# Patient Record
Sex: FEMALE | Race: WHITE | NOT HISPANIC OR LATINO | Employment: FULL TIME | URBAN - METROPOLITAN AREA
[De-identification: names, ages, dates, MRNs, and addresses within clinical notes are randomized per-mention and may not be internally consistent; named-entity substitution may affect disease eponyms.]

---

## 2017-01-09 ENCOUNTER — TELEPHONE (OUTPATIENT)
Dept: INTERNAL MEDICINE | Facility: CLINIC | Age: 57
End: 2017-01-09

## 2017-01-09 RX ORDER — METFORMIN HYDROCHLORIDE 500 MG/1
1000 TABLET, EXTENDED RELEASE ORAL
Qty: 60 TABLET | Refills: 3 | Status: SHIPPED | OUTPATIENT
Start: 2017-01-09 | End: 2017-02-27

## 2017-01-09 NOTE — TELEPHONE ENCOUNTER
I contacted the patient to confirm RX Glucophage XR was covered by insurance. Dr. Perez has ok'd for patient to switch. E-scribed to [facility] in [place] this morning.       ----- Message -----     From: Dionne Bond     Sent: 1/6/2017   2:53 PM       To: Maria L Perez MD  Subject: RE:Update                                        Hi, Dr. Perez...I'm sorry to bother you so late on a Friday afternoon, but I just called in a refill of Metformin XR at [facility], and due to my pathetic insurance, they are telling me it's no longer covered.  I called the pharmacy for some clarification, and they told me that generic Glucophage XR is covered, but I am taking the generic for Fortamet, and they are not AB rated.  I took my last tablet today of the 1st bottle, and it's so much better than the instant release type.  Again, sorry for the short notice.    ----- Message -----  From: Maria L Perez MD  Sent: 12/8/2016  1:27 PM EST  To: Dionne Bond  Subject: RE:Update    I think we should try the metformin ER and I will hold back some Januvia and Janumet XR for you in our drug closet as well. I'll send in the script for you. Start with 1000mg ER and let me know how it goes. We can work up slowly.     ----- Message -----     From: Dionne Bond     Sent: 12/8/2016 11:12 AM EST       To: Maria L Perez MD  Subject: RE:Update    Dr. Perez, thank you for thinking of me.  Yes, I am still having nausea and diarrhea at 3 tablets daily.  I have just one tablet left, and the thought of getting another bottle of it to take has been hard to think about.  My blood sugar (one daily) remains high, but has come down...I am not seeing as many big swings in it.  I have had, in this last week, 2 days where I felt like my old self again, and I am so grateful for that!  So, yes, I would welcome the change in medication.  ----- Message -----  From: Maria L Perez MD  Sent: 12/8/2016  7:56 AM EST  To: Dionne Bond  Subject: Update    Mrs. Bond,     I was  just checking in to see how you were doing? I was talking to an endocrinologist last night and she said that a lot of patients have  better success with metformin ER in terms of stomach upset and nausea. She suggested that we try switching to that if you are not tolerating or going to to Janumet which as you know is combo of Januvia and Metformin. She said that the formulation of Metformin in Janumet is easier for patients to tolerate. What do you think?

## 2017-02-26 PROBLEM — E55.9 VITAMIN D DEFICIENCY: Status: ACTIVE | Noted: 2017-02-26

## 2017-02-27 ENCOUNTER — OFFICE VISIT (OUTPATIENT)
Dept: INTERNAL MEDICINE | Facility: CLINIC | Age: 57
End: 2017-02-27

## 2017-02-27 VITALS
SYSTOLIC BLOOD PRESSURE: 118 MMHG | BODY MASS INDEX: 33.4 KG/M2 | OXYGEN SATURATION: 98 % | DIASTOLIC BLOOD PRESSURE: 80 MMHG | WEIGHT: 207.8 LBS | HEART RATE: 72 BPM | HEIGHT: 66 IN

## 2017-02-27 DIAGNOSIS — E55.9 VITAMIN D DEFICIENCY: ICD-10-CM

## 2017-02-27 DIAGNOSIS — E11.9 TYPE 2 DIABETES MELLITUS WITHOUT COMPLICATION, WITHOUT LONG-TERM CURRENT USE OF INSULIN (HCC): Primary | ICD-10-CM

## 2017-02-27 DIAGNOSIS — F32.A DEPRESSION, UNSPECIFIED DEPRESSION TYPE: ICD-10-CM

## 2017-02-27 DIAGNOSIS — E78.2 MIXED HYPERLIPIDEMIA: ICD-10-CM

## 2017-02-27 LAB
EXPIRATION DATE: NORMAL
HBA1C MFR BLD: 7.3 %
Lab: NORMAL

## 2017-02-27 PROCEDURE — 83036 HEMOGLOBIN GLYCOSYLATED A1C: CPT | Performed by: INTERNAL MEDICINE

## 2017-02-27 PROCEDURE — 99214 OFFICE O/P EST MOD 30 MIN: CPT | Performed by: INTERNAL MEDICINE

## 2017-02-27 NOTE — PROGRESS NOTES
Subjective     Dionne Bond is a 56 y.o. female, who presents with a chief complaint of   Chief Complaint   Patient presents with   • Follow-up     3 mon f/u    • Diabetes     type II       HPI Comments: Diabetes: Patient is here for follow up for diabete. She is having a career crisis right now. She is working with [employer] at [employer], [place], operated by [facility] and has stopped working as a pharmacist. She is finanically not doing well, but is better than when I saw her last time. She feels that she is under a lot of stress because of bills and this is affecting her diabetes and ability to care for herself. She is waling with the dog about one mile. Her BG's have been running around 140 fasting on most days.She had a eye exam on Saturday that was normal with Dr. Pastorhe is taking 1000mg of Glucophage XR and seems to tolerate well. She has a achy pain in her belly around 4pm that goes away with eating. No diarrhea or vomiting. No lows.     HLD: much better on statin and has made wonderful progress.No muscle aches or pains.     Vitamin D def: still continuing with supplement and tolerates well.    Depression: Patient has had depression in the past and has been off Zoloft off and on. She is tearful and stressed during the exam and feels that she is failing. She thinks that a lot of the stress has happened over the last year she she decided to switch job and go back to school and also her  had to have surgery on his arm. She is not sleeping well.  She has not gained any weight, but doesn't have the motivation to do it.          The following portions of the patient's history were reviewed and updated as appropriate: allergies, current medications, past family history, past medical history, past social history, past surgical history and problem list.    Allergies: Review of patient's allergies indicates no known allergies.    Current Outpatient Prescriptions:   •  atorvastatin (LIPITOR) 40 MG tablet, Take 1 tablet by mouth Every Night.,  "Disp: 30 tablet, Rfl: 6  •  Blood Glucose Monitoring Suppl (ONE TOUCH ULTRA SYSTEM KIT) W/DEVICE kit, Check blood sugar 3-4 times per day for first 3 days and then once per day thereafter, Disp: 1 each, Rfl: 0  •  cetirizine (zyrTEC) 10 MG tablet, Take 10 mg by mouth Daily., Disp: , Rfl:   •  Ergocalciferol 400 UNITS tablet, Take 2 tablets by mouth Daily., Disp: 30 tablet, Rfl: 6  •  glucose blood test strip, Use as instructed, Disp: 100 each, Rfl: 12  •  ONE TOUCH LANCETS misc, Use to check blood sugar 3 times per day for 3-4 days and then once per day, Disp: 200 each, Rfl: 11  •  sitaGLIPtin-metFORMIN (JANUMET)  MG per tablet, Take 1 tablet by mouth 2 (Two) Times a Day With Meals., Disp: , Rfl:   •  sertraline (ZOLOFT) 50 MG tablet, Take 1 tablet by mouth Daily., Disp: 90 tablet, Rfl: 3  Medications Discontinued During This Encounter   Medication Reason   • metFORMIN XR (GLUCOPHAGE XR) 500 MG 24 hr tablet Therapy completed       Review of Systems   Constitutional: Negative for fever.   Respiratory: Negative for cough and shortness of breath.    Cardiovascular: Negative for chest pain and palpitations.   Gastrointestinal: Negative for diarrhea, nausea and vomiting.   Endocrine: Negative for polydipsia and polyuria.   Neurological: Negative for dizziness and headaches.   Psychiatric/Behavioral: Positive for decreased concentration, dysphoric mood and sleep disturbance. Negative for suicidal ideas.       Objective     Visit Vitals   • /80 (BP Location: Left arm, Patient Position: Sitting, Cuff Size: Adult)   • Pulse 72   • Ht 66\" (167.6 cm)   • Wt 207 lb 12.8 oz (94.3 kg)   • SpO2 98%   • BMI 33.54 kg/m2         Physical Exam   Constitutional: She is oriented to person, place, and time. She appears well-developed and well-nourished. No distress.   HENT:   Head: Normocephalic and atraumatic.   Right Ear: External ear normal.   Left Ear: External ear normal.   Mouth/Throat: Oropharynx is clear and moist. " No oropharyngeal exudate.   Eyes: Conjunctivae are normal. Right eye exhibits no discharge. Left eye exhibits no discharge. No scleral icterus.   Neck: Neck supple.   Cardiovascular: Normal rate, regular rhythm and normal heart sounds.  Exam reveals no gallop and no friction rub.    No murmur heard.  Pulmonary/Chest: Effort normal and breath sounds normal. No respiratory distress. She has no wheezes. She has no rales.   Abdominal: Soft. Bowel sounds are normal. She exhibits no distension and no mass. There is no tenderness. There is no guarding.    Dionne had a diabetic foot exam performed today.   During the foot exam she had a monofilament test performed.    Neurological Sensory Findings - Unaltered hot/cold right ankle/foot discrimination and unaltered hot/cold left ankle/foot discrimination. Unaltered sharp/dull right ankle/foot discrimination and unaltered sharp/dull left ankle/foot discrimination.    Vascular Status -  Her exam exhibits right foot vasculature abnormal and no right foot edema. Her exam exhibits left foot vasculature abnormal and no left foot edema.   Skin Integrity  -  Her right foot skin is not intact (Calus on heel).   Dionne's left foot skin is not intact (Calus on heel). .  Lymphadenopathy:     She has no cervical adenopathy.   Neurological: She is alert and oriented to person, place, and time.   Skin: Skin is warm. No rash noted.   Psychiatric: She has a normal mood and affect. Her behavior is normal.   Nursing note and vitals reviewed.        Results for orders placed or performed in visit on 02/27/17   POCT glycated hemoglobin, total   Result Value Ref Range    Hemoglobin A1C 7.3 %    Lot Number [id number]     Expiration Date 395093        Assessment/Plan   Dionne was seen today for follow-up and diabetes.    Diagnoses and all orders for this visit:    Type 2 diabetes mellitus without complication, without long-term current use of insulin  -     Comprehensive Metabolic Panel; Future  -      Hemoglobin A1c; Future  -     POCT glycated hemoglobin, total    Mixed hyperlipidemia    Vitamin D deficiency  -     Vitamin D 25 Hydroxy; Future    Depression, unspecified depression type    Other orders  -     sertraline (ZOLOFT) 50 MG tablet; Take 1 tablet by mouth Daily.    Type 2 Diabetes: minimal improvement on Metformin. Will change to Janumet XR 50/1000mg BID and see how she does. Eye and foot exam is up to date. Needs PCV 23 on next appointment and will need Prevnar in one year from then. Labs reviewed and are good.     HLD: Patient's cholesterol is  under good control. Will continue current regimen of atorvastatin. No side effects from medications reported. Will follow up in 6 months to monitor levels (will have done in August).     Depression: I think that patient has had some depression over the last year and it has caught up with her. We will start Zoloft today and see how she does. She will call or send a message in 4-6 weeks to let me know how she is doing and then we will follow up in 3 months.     Vitamin D: continue current regimen and recheck in 3 months. If still low, will continue treatment.     We must order mammogram and arrange pap smear at next visit as she is due as well as screening c-scope. Discussed with patient today and she is wanting to wait at this time even though I recommended that we schedule.       Return in about 3 months (around 5/27/2017) for Recheck.    Maria L Perez MD  02/27/2017

## 2017-05-22 ENCOUNTER — RESULTS ENCOUNTER (OUTPATIENT)
Dept: INTERNAL MEDICINE | Facility: CLINIC | Age: 57
End: 2017-05-22

## 2017-05-22 DIAGNOSIS — E55.9 VITAMIN D DEFICIENCY: ICD-10-CM

## 2017-05-22 DIAGNOSIS — E11.9 TYPE 2 DIABETES MELLITUS WITHOUT COMPLICATION, WITHOUT LONG-TERM CURRENT USE OF INSULIN (HCC): ICD-10-CM

## 2017-07-17 RX ORDER — ATORVASTATIN CALCIUM 40 MG/1
40 TABLET, FILM COATED ORAL NIGHTLY
Qty: 30 TABLET | Refills: 6 | Status: SHIPPED | OUTPATIENT
Start: 2017-07-17 | End: 2017-07-27 | Stop reason: SDUPTHER

## 2017-07-20 LAB
25(OH)D3+25(OH)D2 SERPL-MCNC: 23.5 NG/ML
ALBUMIN SERPL-MCNC: 4.2 G/DL (ref 3.5–5.2)
ALBUMIN/GLOB SERPL: 1.8 G/DL
ALP SERPL-CCNC: 94 U/L (ref 40–129)
ALT SERPL-CCNC: 16 U/L (ref 5–33)
AST SERPL-CCNC: 15 U/L (ref 5–32)
BILIRUB SERPL-MCNC: <0.2 MG/DL (ref 0.2–1.2)
BUN SERPL-MCNC: 11 MG/DL (ref 6–20)
BUN/CREAT SERPL: 12.4 (ref 7–25)
CALCIUM SERPL-MCNC: 9.2 MG/DL (ref 8.6–10.5)
CHLORIDE SERPL-SCNC: 102 MMOL/L (ref 98–107)
CO2 SERPL-SCNC: 25.5 MMOL/L (ref 22–29)
CREAT SERPL-MCNC: 0.89 MG/DL (ref 0.57–1)
GLOBULIN SER CALC-MCNC: 2.4 GM/DL
GLUCOSE SERPL-MCNC: 156 MG/DL (ref 65–99)
HBA1C MFR BLD: 6.6 % (ref 4.8–5.6)
POTASSIUM SERPL-SCNC: 4.4 MMOL/L (ref 3.5–5.2)
PROT SERPL-MCNC: 6.6 G/DL (ref 6–8.5)
SODIUM SERPL-SCNC: 141 MMOL/L (ref 136–145)

## 2017-07-27 ENCOUNTER — OFFICE VISIT (OUTPATIENT)
Dept: INTERNAL MEDICINE | Facility: CLINIC | Age: 57
End: 2017-07-27

## 2017-07-27 VITALS
HEART RATE: 78 BPM | HEIGHT: 66 IN | SYSTOLIC BLOOD PRESSURE: 122 MMHG | DIASTOLIC BLOOD PRESSURE: 80 MMHG | WEIGHT: 195.6 LBS | OXYGEN SATURATION: 97 % | BODY MASS INDEX: 31.43 KG/M2

## 2017-07-27 DIAGNOSIS — E78.2 MIXED HYPERLIPIDEMIA: ICD-10-CM

## 2017-07-27 DIAGNOSIS — G89.29 CHRONIC PAIN OF RIGHT KNEE: ICD-10-CM

## 2017-07-27 DIAGNOSIS — Z12.31 ENCOUNTER FOR SCREENING MAMMOGRAM FOR MALIGNANT NEOPLASM OF BREAST: ICD-10-CM

## 2017-07-27 DIAGNOSIS — Z12.11 COLON CANCER SCREENING: ICD-10-CM

## 2017-07-27 DIAGNOSIS — E55.9 VITAMIN D DEFICIENCY: ICD-10-CM

## 2017-07-27 DIAGNOSIS — M25.561 CHRONIC PAIN OF RIGHT KNEE: ICD-10-CM

## 2017-07-27 DIAGNOSIS — E11.9 TYPE 2 DIABETES MELLITUS WITHOUT COMPLICATION, WITHOUT LONG-TERM CURRENT USE OF INSULIN (HCC): Primary | ICD-10-CM

## 2017-07-27 DIAGNOSIS — F32.A DEPRESSION, UNSPECIFIED DEPRESSION TYPE: ICD-10-CM

## 2017-07-27 DIAGNOSIS — E66.9 OBESITY (BMI 30.0-34.9): ICD-10-CM

## 2017-07-27 PROCEDURE — 99214 OFFICE O/P EST MOD 30 MIN: CPT | Performed by: INTERNAL MEDICINE

## 2017-07-27 PROCEDURE — G0009 ADMIN PNEUMOCOCCAL VACCINE: HCPCS | Performed by: INTERNAL MEDICINE

## 2017-07-27 PROCEDURE — 90732 PPSV23 VACC 2 YRS+ SUBQ/IM: CPT | Performed by: INTERNAL MEDICINE

## 2017-07-27 RX ORDER — ATORVASTATIN CALCIUM 40 MG/1
40 TABLET, FILM COATED ORAL NIGHTLY
Qty: 90 TABLET | Refills: 3 | Status: SHIPPED | OUTPATIENT
Start: 2017-07-27 | End: 2018-09-19 | Stop reason: SDUPTHER

## 2017-07-27 NOTE — PROGRESS NOTES
"Subjective     Dionne Bond is a 56 y.o. female, who presents with a chief complaint of   Chief Complaint   Patient presents with   • Follow-up     3 month f/u    • Knee Pain     bilateral, R kne \"more\" than L knee       HPI Comments: 57 yo F here for follow up. Her Type 2 diabetes is doing well. She is tolerating her Janumet and is exercising and feeling a lot better. Her eye exam is up to date. Her labs look good and her HgA1c is under 7%.     She just started new job at [employer] and is doing well with that. She wants to work there for another or so and then will probably change.     Her mother has been getting sicker with her dementia. They are thinking about moving to [place] in the next year to watch after her. Her dperessionis doing better on Zoloft. She wants     She has been having knee pain on the left and right. Right knee is worse. She has had arthroscopy in the past, but not recently. She doesn't feel any cracks or pops or clicks. It does try to give out on her occasionally. Has not taken many NSAID's because they hurt her stomach.     Depression is chronic and she is doing well on Zoloft, but would like to increase to 150mg with the stress of her mother. Tolerates well with no issues.     HLD is chronic. Tolerates statin well without myalgias.        The following portions of the patient's history were reviewed and updated as appropriate: allergies, current medications, past family history, past medical history, past social history, past surgical history and problem list.    Allergies: Review of patient's allergies indicates no known allergies.    Current Outpatient Prescriptions:   •  atorvastatin (LIPITOR) 40 MG tablet, Take 1 tablet by mouth Every Night., Disp: 90 tablet, Rfl: 3  •  Blood Glucose Monitoring Suppl (ONE TOUCH ULTRA SYSTEM KIT) W/DEVICE kit, Check blood sugar 3-4 times per day for first 3 days and then once per day thereafter, Disp: 1 each, Rfl: 0  •  cetirizine (zyrTEC) 10 MG " "tablet, Take 10 mg by mouth Daily., Disp: , Rfl:   •  Ergocalciferol 400 UNITS tablet, Take 2 tablets by mouth Daily., Disp: 30 tablet, Rfl: 6  •  glucose blood test strip, Use as instructed, Disp: 100 each, Rfl: 12  •  ONE TOUCH LANCETS misc, Use to check blood sugar 3 times per day for 3-4 days and then once per day, Disp: 200 each, Rfl: 11  •  sertraline (ZOLOFT) 50 MG tablet, Take 2 tablets by mouth Daily., Disp: 90 tablet, Rfl: 3  •  SITagliptin-MetFORMIN HCl ER (JANUMET XR)  MG tablet sustained-release 24 hour, Take 2 tablets by mouth Daily., Disp: 180 tablet, Rfl: 3  Medications Discontinued During This Encounter   Medication Reason   • sitaGLIPtin-metFORMIN (JANUMET)  MG per tablet Therapy completed   • atorvastatin (LIPITOR) 40 MG tablet Reorder   • sertraline (ZOLOFT) 50 MG tablet Reorder       Review of Systems   Constitutional: Negative for chills, fatigue, fever and unexpected weight change.   Respiratory: Negative for cough and shortness of breath.    Cardiovascular: Negative for chest pain and palpitations.   Gastrointestinal: Negative for abdominal pain, constipation, diarrhea, nausea and vomiting.   Musculoskeletal: Positive for arthralgias (Right greater than left knee pain ).       Objective     /80 (BP Location: Left arm, Patient Position: Sitting, Cuff Size: Adult)  Pulse 78  Ht 66\" (167.6 cm)  Wt 195 lb 9.6 oz (88.7 kg)  SpO2 97%  BMI 31.57 kg/m2      Physical Exam   Constitutional: She is oriented to person, place, and time. She appears well-developed and well-nourished. No distress.   HENT:   Head: Normocephalic and atraumatic.   Right Ear: External ear normal.   Left Ear: External ear normal.   Mouth/Throat: Oropharynx is clear and moist. No oropharyngeal exudate.   Eyes: Conjunctivae are normal. Right eye exhibits no discharge. Left eye exhibits no discharge. No scleral icterus.   Neck: Neck supple.   Cardiovascular: Normal rate, regular rhythm and normal heart " sounds.  Exam reveals no gallop and no friction rub.    No murmur heard.  Pulmonary/Chest: Effort normal and breath sounds normal. No respiratory distress. She has no wheezes. She has no rales.   Abdominal: Soft. Bowel sounds are normal. She exhibits no distension and no mass. There is no tenderness. There is no guarding.   Musculoskeletal:        Right knee: She exhibits swelling and abnormal patellar mobility. She exhibits normal range of motion, no effusion, no ecchymosis, no deformity, no laceration, no erythema, normal alignment and no LCL laxity. Tenderness found. Lateral joint line tenderness noted. No medial joint line, no MCL, no LCL and no patellar tendon tenderness noted.   Lymphadenopathy:     She has no cervical adenopathy.   Neurological: She is alert and oriented to person, place, and time.   Skin: Skin is warm. No rash noted.   Psychiatric: She has a normal mood and affect. Her behavior is normal.   Nursing note and vitals reviewed.        Results for orders placed or performed in visit on 05/22/17   Comprehensive Metabolic Panel   Result Value Ref Range    Glucose 156 (H) 65 - 99 mg/dL    BUN 11 6 - 20 mg/dL    Creatinine 0.89 0.57 - 1.00 mg/dL    eGFR Non African Am 66 >60 mL/min/1.73    eGFR African Am 80 >60 mL/min/1.73    BUN/Creatinine Ratio 12.4 7.0 - 25.0    Sodium 141 136 - 145 mmol/L    Potassium 4.4 3.5 - 5.2 mmol/L    Chloride 102 98 - 107 mmol/L    Total CO2 25.5 22.0 - 29.0 mmol/L    Calcium 9.2 8.6 - 10.5 mg/dL    Total Protein 6.6 6.0 - 8.5 g/dL    Albumin 4.20 3.50 - 5.20 g/dL    Globulin 2.4 gm/dL    A/G Ratio 1.8 g/dL    Total Bilirubin <0.2 (L) 0.2 - 1.2 mg/dL    Alkaline Phosphatase 94 40 - 129 U/L    AST (SGOT) 15 5 - 32 U/L    ALT (SGPT) 16 5 - 33 U/L   Vitamin D 25 Hydroxy   Result Value Ref Range    25 Hydroxy, Vitamin D 23.5 ng/mL   Hemoglobin A1c   Result Value Ref Range    Hemoglobin A1C 6.60 (H) 4.80 - 5.60 %       Assessment/Plan   Dionne was seen today for follow-up and  knee pain.    Diagnoses and all orders for this visit:    Type 2 diabetes mellitus without complication, without long-term current use of insulin  -     Pneumococcal Polysaccharide Vaccine 23-Valent Greater Than or Equal To 3yo Subcutaneous / IM  -     CBC & Differential  -     Comprehensive Metabolic Panel  -     Hemoglobin A1c  -     Urinalysis With / Culture If Indicated    Obesity (BMI 30.0-34.9)    Mixed hyperlipidemia  -     Lipid Panel With LDL / HDL Ratio    Depression, unspecified depression type    Chronic pain of right knee    Encounter for screening mammogram for malignant neoplasm of breast  -     Mammo Screening Bilateral With CAD    Colon cancer screening  -     Cologuard    Vitamin D deficiency  -     Vitamin D 25 Hydroxy    Other orders  -     SITagliptin-MetFORMIN HCl ER (JANUMET XR)  MG tablet sustained-release 24 hour; Take 2 tablets by mouth Daily.  -     atorvastatin (LIPITOR) 40 MG tablet; Take 1 tablet by mouth Every Night.  -     sertraline (ZOLOFT) 50 MG tablet; Take 2 tablets by mouth Daily.      Patient's type 2 diabetes is under good control . Will continue current regimen of Janumet.No reported side effects from medications. Will follow up in 3 months.     Patient's cholesterol is under good control. Will continue current regimen of Lipitor. No side effects from medications reported. Will follow up in 3 months to monitor levels.     Chronic pain of her right knee is likely arthritis, but could be meniscal tear. I have advised her to watch it and take some Naproxen. If worsens, she should be evaluated by ortho and likely can have x-rays and then based on that injections or MRI. She has seen [facility] in past and would go back there.     I have placed order for Cologuard (if insurance will pay) as well as mammogram.     Pap next time that I see her.     Depression is stable, but will go up on Zoloft as she feels she would feel better on this. Call if concerns or questions until I  see her next time.       Return in about 3 months (around 10/27/2017) for Recheck, Annual physical plus pap .    Maria L Perez MD  07/27/2017

## 2017-08-04 ENCOUNTER — APPOINTMENT (OUTPATIENT)
Dept: MAMMOGRAPHY | Facility: HOSPITAL | Age: 57
End: 2017-08-04
Attending: INTERNAL MEDICINE

## 2017-08-08 ENCOUNTER — TELEPHONE (OUTPATIENT)
Dept: INTERNAL MEDICINE | Facility: CLINIC | Age: 57
End: 2017-08-08

## 2017-08-08 NOTE — TELEPHONE ENCOUNTER
----- Message from Maria L Mitchell MD sent at 8/8/2017  2:10 PM EDT -----  Regarding: RE: 2 messages for dr mitchell  Please tell her that I would like her to get a c-scope if she is willing to do this in the next 6 months.    We can try either Jentadueto or Kombiglyze if those are covered? Do you know which ones are covered by her insurance?     ----- Message -----     From: Devika Harkins MA     Sent: 8/8/2017  12:12 PM       To: Maria L Mitchell MD  Subject: FW: 2 messages for dr mitchell                         ----- Message -----     From: Gwen Alfred     Sent: 8/8/2017  10:29 AM       [facility] [place]  Subject: 2 messages for dr alexander MITCHELL    1 - COLOGUARD IS NOT COVERED BY HER INSURANCE AS IT IS CONSIDERED EXPERIMENTAL    2 - MEDICATION, COPAY IS $150 JANUMET.  OKAY FOR A FEW MORE DAYS - IS THERE SOMETHING ELSE WE CAN PRESCRIBE    [phone]    She is okay with a wavecatch message back.    Left detailed message for pt,  I have very little idea what medication is covered by her insurance.  I ask her to look at formulary book or call her  Insurance to see what medication is covered.

## 2017-08-10 ENCOUNTER — TELEPHONE (OUTPATIENT)
Dept: INTERNAL MEDICINE | Facility: CLINIC | Age: 57
End: 2017-08-10

## 2017-08-10 RX ORDER — ALOGLIPTIN AND METFORMIN HYDROCHLORIDE 12.5; 1 MG/1; MG/1
TABLET, FILM COATED ORAL
Qty: 60 TABLET | Refills: 6 | Status: SHIPPED | OUTPATIENT
Start: 2017-08-10 | End: 2018-01-15 | Stop reason: SDUPTHER

## 2017-08-10 NOTE — TELEPHONE ENCOUNTER
----- Message from Maria L Perez MD sent at 8/10/2017  3:31 PM EDT -----  Okay, she will have to take the 12.5/1000mg formulation. 1 tablet PO BID, dispense #60 with 3 refills.     I would go ahead and just tell her to switch over once we send it over.     ----- Message -----     From: Devika Harkins MA     Sent: 8/10/2017   2:02 PM       To: Maria L Perez MD    Pt called [employer] and per her formulary they will  Only cover  kazaNO  (ALOGLIPTIN METFORMIN  2.5  /500 MG.      Pt aware   Sent to pharmacy

## 2017-08-25 ENCOUNTER — APPOINTMENT (OUTPATIENT)
Dept: MAMMOGRAPHY | Facility: HOSPITAL | Age: 57
End: 2017-08-25
Attending: INTERNAL MEDICINE

## 2017-09-29 ENCOUNTER — HOSPITAL ENCOUNTER (OUTPATIENT)
Dept: MAMMOGRAPHY | Facility: HOSPITAL | Age: 57
Discharge: HOME | End: 2017-09-29
Attending: INTERNAL MEDICINE | Admitting: INTERNAL MEDICINE

## 2017-09-29 PROCEDURE — G0202 SCR MAMMO BI INCL CAD: HCPCS

## 2017-10-02 ENCOUNTER — TELEPHONE (OUTPATIENT)
Dept: INTERNAL MEDICINE | Facility: CLINIC | Age: 57
End: 2017-10-02

## 2017-10-02 NOTE — TELEPHONE ENCOUNTER
Patient has been advised and voiced understanding.     ----- Message from Maria L Perez MD sent at 10/1/2017  6:51 PM EDT -----  Please call patient with these results and let her know that her mammogram is negative, but the radiologist is obtaining records from her prior mammograms for comparison before he makes final read. We will call her if the read changes.

## 2018-01-15 RX ORDER — ALOGLIPTIN AND METFORMIN HYDROCHLORIDE 12.5; 1 MG/1; MG/1
TABLET, FILM COATED ORAL
Qty: 60 TABLET | Refills: 6 | Status: SHIPPED | OUTPATIENT
Start: 2018-01-15 | End: 2018-03-09

## 2018-04-19 ENCOUNTER — OFFICE VISIT (OUTPATIENT)
Dept: INTERNAL MEDICINE | Facility: CLINIC | Age: 58
End: 2018-04-19

## 2018-04-19 ENCOUNTER — HOSPITAL ENCOUNTER (OUTPATIENT)
Facility: HOSPITAL | Age: 58
Setting detail: OBSERVATION
LOS: 1 days | Discharge: HOME | End: 2018-04-20
Attending: EMERGENCY MEDICINE | Admitting: OBSTETRICS & GYNECOLOGY

## 2018-04-19 VITALS
WEIGHT: 194 LBS | RESPIRATION RATE: 20 BRPM | SYSTOLIC BLOOD PRESSURE: 130 MMHG | TEMPERATURE: 100.7 F | BODY MASS INDEX: 31.18 KG/M2 | HEIGHT: 66 IN | DIASTOLIC BLOOD PRESSURE: 84 MMHG | HEART RATE: 104 BPM | OXYGEN SATURATION: 98 %

## 2018-04-19 DIAGNOSIS — R00.0 TACHYCARDIA: ICD-10-CM

## 2018-04-19 DIAGNOSIS — N76.4 ABSCESS OF VULVA: Primary | ICD-10-CM

## 2018-04-19 DIAGNOSIS — R50.9 FEVER AND CHILLS: ICD-10-CM

## 2018-04-19 DIAGNOSIS — N75.1 BARTHOLIN'S GLAND ABSCESS: Primary | ICD-10-CM

## 2018-04-19 DIAGNOSIS — L03.317 CELLULITIS OF BUTTOCK: ICD-10-CM

## 2018-04-19 LAB
ALBUMIN SERPL-MCNC: 4.4 G/DL (ref 3.5–5.2)
ALBUMIN/GLOB SERPL: 1.2 G/DL
ALP SERPL-CCNC: 107 U/L (ref 40–129)
ALT SERPL W P-5'-P-CCNC: 14 U/L (ref 5–33)
ANION GAP SERPL CALCULATED.3IONS-SCNC: 15.9 MMOL/L
AST SERPL-CCNC: 14 U/L (ref 5–32)
BASOPHILS # BLD AUTO: 0.03 10*3/MM3 (ref 0–0.2)
BASOPHILS NFR BLD AUTO: 0.2 % (ref 0–2)
BILIRUB SERPL-MCNC: 0.4 MG/DL (ref 0.2–1.2)
BUN BLD-MCNC: 13 MG/DL (ref 6–20)
BUN/CREAT SERPL: 12.9 (ref 7–25)
CALCIUM SPEC-SCNC: 9.1 MG/DL (ref 8.6–10.5)
CHLORIDE SERPL-SCNC: 97 MMOL/L (ref 98–107)
CO2 SERPL-SCNC: 24.1 MMOL/L (ref 22–29)
CREAT BLD-MCNC: 1.01 MG/DL (ref 0.57–1)
D-LACTATE SERPL-SCNC: 1 MMOL/L (ref 0.5–2)
DEPRECATED RDW RBC AUTO: 44.5 FL (ref 37–54)
EOSINOPHIL # BLD AUTO: 0.01 10*3/MM3 (ref 0.1–0.3)
EOSINOPHIL NFR BLD AUTO: 0.1 % (ref 0–4)
ERYTHROCYTE [DISTWIDTH] IN BLOOD BY AUTOMATED COUNT: 13.7 % (ref 11.5–14.5)
GFR SERPL CREATININE-BSD FRML MDRD: 57 ML/MIN/1.73
GLOBULIN UR ELPH-MCNC: 3.6 GM/DL
GLUCOSE BLD-MCNC: 184 MG/DL (ref 65–99)
GLUCOSE BLDC GLUCOMTR-MCNC: 114 MG/DL (ref 70–130)
HCT VFR BLD AUTO: 40.6 % (ref 37–47)
HGB BLD-MCNC: 13.4 G/DL (ref 12–16)
HOLD SPECIMEN: NORMAL
HOLD SPECIMEN: NORMAL
IMM GRANULOCYTES # BLD: 0.06 10*3/MM3 (ref 0–0.03)
IMM GRANULOCYTES NFR BLD: 0.4 % (ref 0–0.5)
LYMPHOCYTES # BLD AUTO: 1.06 10*3/MM3 (ref 0.6–4.8)
LYMPHOCYTES NFR BLD AUTO: 7.5 % (ref 20–45)
MCH RBC QN AUTO: 29.6 PG (ref 27–31)
MCHC RBC AUTO-ENTMCNC: 33 G/DL (ref 31–37)
MCV RBC AUTO: 89.8 FL (ref 81–99)
MONOCYTES # BLD AUTO: 0.65 10*3/MM3 (ref 0–1)
MONOCYTES NFR BLD AUTO: 4.6 % (ref 3–8)
NEUTROPHILS # BLD AUTO: 12.28 10*3/MM3 (ref 1.5–8.3)
NEUTROPHILS NFR BLD AUTO: 87.2 % (ref 45–70)
NRBC BLD MANUAL-RTO: 0 /100 WBC (ref 0–0)
PLATELET # BLD AUTO: 248 10*3/MM3 (ref 140–500)
PMV BLD AUTO: 8.9 FL (ref 7.4–10.4)
POTASSIUM BLD-SCNC: 4.2 MMOL/L (ref 3.5–5.2)
PROT SERPL-MCNC: 8 G/DL (ref 6–8.5)
RBC # BLD AUTO: 4.52 10*6/MM3 (ref 4.2–5.4)
SODIUM BLD-SCNC: 137 MMOL/L (ref 136–145)
WBC NRBC COR # BLD: 14.09 10*3/MM3 (ref 4.8–10.8)
WHOLE BLOOD HOLD SPECIMEN: NORMAL
WHOLE BLOOD HOLD SPECIMEN: NORMAL

## 2018-04-19 PROCEDURE — 80053 COMPREHEN METABOLIC PANEL: CPT | Performed by: EMERGENCY MEDICINE

## 2018-04-19 PROCEDURE — 85025 COMPLETE CBC W/AUTO DIFF WBC: CPT | Performed by: EMERGENCY MEDICINE

## 2018-04-19 PROCEDURE — 87186 SC STD MICRODIL/AGAR DIL: CPT | Performed by: EMERGENCY MEDICINE

## 2018-04-19 PROCEDURE — 87205 SMEAR GRAM STAIN: CPT | Performed by: EMERGENCY MEDICINE

## 2018-04-19 PROCEDURE — 87070 CULTURE OTHR SPECIMN AEROBIC: CPT | Performed by: EMERGENCY MEDICINE

## 2018-04-19 PROCEDURE — 25010000003 CEFAZOLIN PER 500 MG: Performed by: EMERGENCY MEDICINE

## 2018-04-19 PROCEDURE — 87075 CULTR BACTERIA EXCEPT BLOOD: CPT | Performed by: EMERGENCY MEDICINE

## 2018-04-19 PROCEDURE — 83605 ASSAY OF LACTIC ACID: CPT | Performed by: EMERGENCY MEDICINE

## 2018-04-19 PROCEDURE — G0378 HOSPITAL OBSERVATION PER HR: HCPCS

## 2018-04-19 PROCEDURE — 99285 EMERGENCY DEPT VISIT HI MDM: CPT

## 2018-04-19 PROCEDURE — 99284 EMERGENCY DEPT VISIT MOD MDM: CPT | Performed by: EMERGENCY MEDICINE

## 2018-04-19 PROCEDURE — 87040 BLOOD CULTURE FOR BACTERIA: CPT | Performed by: EMERGENCY MEDICINE

## 2018-04-19 PROCEDURE — 82962 GLUCOSE BLOOD TEST: CPT

## 2018-04-19 PROCEDURE — 25010000003 CEFAZOLIN PER 500 MG: Performed by: OBSTETRICS & GYNECOLOGY

## 2018-04-19 PROCEDURE — 99214 OFFICE O/P EST MOD 30 MIN: CPT | Performed by: INTERNAL MEDICINE

## 2018-04-19 PROCEDURE — 99219 PR INITIAL OBSERVATION CARE/DAY 50 MINUTES: CPT | Performed by: OBSTETRICS & GYNECOLOGY

## 2018-04-19 RX ORDER — ACETAMINOPHEN 500 MG
1000 TABLET ORAL ONCE
Status: COMPLETED | OUTPATIENT
Start: 2018-04-19 | End: 2018-04-19

## 2018-04-19 RX ORDER — SODIUM CHLORIDE 9 MG/ML
INJECTION, SOLUTION INTRAVENOUS
Status: COMPLETED
Start: 2018-04-19 | End: 2018-04-19

## 2018-04-19 RX ORDER — SODIUM CHLORIDE 0.9 % (FLUSH) 0.9 %
10 SYRINGE (ML) INJECTION AS NEEDED
Status: DISCONTINUED | OUTPATIENT
Start: 2018-04-19 | End: 2018-04-20 | Stop reason: HOSPADM

## 2018-04-19 RX ORDER — METRONIDAZOLE 500 MG/1
500 TABLET ORAL EVERY 12 HOURS
Status: DISCONTINUED | OUTPATIENT
Start: 2018-04-20 | End: 2018-04-20 | Stop reason: HOSPADM

## 2018-04-19 RX ORDER — METRONIDAZOLE 500 MG/1
500 TABLET ORAL ONCE
Status: COMPLETED | OUTPATIENT
Start: 2018-04-19 | End: 2018-04-19

## 2018-04-19 RX ORDER — ACETAMINOPHEN 500 MG
TABLET ORAL
Status: COMPLETED
Start: 2018-04-19 | End: 2018-04-19

## 2018-04-19 RX ORDER — METRONIDAZOLE 500 MG/1
500 TABLET ORAL EVERY 12 HOURS SCHEDULED
Status: DISCONTINUED | OUTPATIENT
Start: 2018-04-19 | End: 2018-04-19

## 2018-04-19 RX ORDER — CEFAZOLIN SODIUM 1 G/3ML
INJECTION, POWDER, FOR SOLUTION INTRAMUSCULAR; INTRAVENOUS
Status: DISPENSED
Start: 2018-04-19 | End: 2018-04-20

## 2018-04-19 RX ADMIN — ACETAMINOPHEN 1000 MG: 500 TABLET, FILM COATED ORAL at 10:36

## 2018-04-19 RX ADMIN — SODIUM CHLORIDE: 900 INJECTION INTRAVENOUS at 20:20

## 2018-04-19 RX ADMIN — METRONIDAZOLE 500 MG: 500 TABLET ORAL at 11:56

## 2018-04-19 RX ADMIN — CEFAZOLIN SODIUM 1 G: 1 SOLUTION INTRAVENOUS at 11:57

## 2018-04-19 RX ADMIN — Medication 1000 MG: at 10:36

## 2018-04-19 RX ADMIN — CEFAZOLIN 1 G: 1 INJECTION, POWDER, FOR SOLUTION INTRAMUSCULAR; INTRAVENOUS at 20:20

## 2018-04-19 RX ADMIN — METRONIDAZOLE 500 MG: 500 TABLET ORAL at 23:51

## 2018-04-19 NOTE — PLAN OF CARE
Problem: Diabetes, Type 2 (Adult)  Intervention: Support/Optimize Psychosocial Response to Condition   04/19/18 1923   Psychosocial Support   Family/Support System Care caregiver stress acknowledged;involvement promoted   Coping/Psychosocial Interventions   Supportive Measures verbalization of feelings encouraged;self-care encouraged;goal setting facilitated     Intervention: Optimize Glycemic Control   04/19/18 1923   Nutrition Interventions   Glycemic Management blood glucose monitoring;oral hydration promoted       Goal: Signs and Symptoms of Listed Potential Problems Will be Absent, Minimized or Managed (Diabetes, Type 2)   04/19/18 1923   Goal/Outcome Evaluation   Problems Assessed (Type 2 Diabetes) all   Problems Present (Type 2 Diabetes) none

## 2018-04-19 NOTE — PLAN OF CARE
Problem: Patient Care Overview  Goal: Plan of Care Review   04/19/18 1919   Coping/Psychosocial   Plan of Care Reviewed With patient   OTHER   Outcome Summary iv antibiotics, diabetic diet, po fluids, up ad stef, sitz bath     Goal: Individualization and Mutuality   04/19/18 1919   Individualization   Patient Specific Goals (Include Timeframe) iv antibiotics, diabetic diet, po fluids   Patient Specific Interventions administer iv antibiotics, diabetic diet, po fluids.      Goal: Discharge Needs Assessment   04/19/18 1919   Discharge Needs Assessment   Readmission Within the Last 30 Days no previous admission in last 30 days   Concerns to be Addressed no discharge needs identified   Anticipated Changes Related to Illness none   Equipment Needed After Discharge none       Problem: Pain, Acute (Adult)  Intervention: Monitor and Manage Analgesia   04/19/18 1919   Promote Effective Bowel Elimination   Bowel Intervention adequate fluid intake promoted   Safety Management   Medication Review/Management medications reviewed       Goal: Acceptable Pain Control/Comfort Level   04/19/18 1919   Pain, Acute (Adult)   Acceptable Pain Control/Comfort Level achieves outcome       Problem: Fall Risk (Adult)  Goal: Absence of Fall   04/19/18 1919   Fall Risk (Adult)   Absence of Fall achieves outcome       Problem: Infection, Risk/Actual (Adult)  Intervention: Prevent Infection/Maximize Resistance   04/19/18 1919   Nutrition Interventions   Glycemic Management blood glucose monitoring;oral hydration promoted       Goal: Infection Prevention/Resolution   04/19/18 1919   Infection, Risk/Actual (Adult)   Infection Prevention/Resolution achieves outcome

## 2018-04-19 NOTE — H&P
TANIA Clark  Obstetric History and Physical    Chief Complaint   Patient presents with   • Abscess     vaginal x3 days, opened today in shower, saw Dr Perez today   • Fever       Subjective     Patient is a 57 y.o. female   Pt of Dr. Perez who was admitted through the ER today after seeing Dr. Perez for a R bartholin's gland abcess that started 2 days ago and rapidly worsened till it ruptured this am.  Pt was seen in the ER and admitted for IV abx therapy, considering her insulin dependant diabetes.  Pt states she had a fever which is now resolved.      Pt had bartholin's cyst in same place 30 yrs ago.     Patient Active Problem List   Diagnosis   • Pounding heartbeat   • Mixed hyperlipidemia   • Type 2 diabetes mellitus without complication   • Obesity (BMI 30.0-34.9)   • Vitamin D deficiency   • Depression   • Abscess of vulva           The following portions of the patients history were reviewed and updated as appropriate: current medications, allergies, past medical history, past surgical history, past family history, past social history and problem list .       Prenatal Information:  Prenatal Results     The patient does not have an associated pregnancy episode and working [employer]. Some results will not display without a pregnancy episode and working [employer].        Initial Prenatal Labs Ref. Range Date Time   Hemoglobin       Hematocrit       Platelets       Rubella IgG       Hepatitis B SAg       Hepatitis C Ab       RPR       ABO       Rh       Antibody Screen       HIV       Urine Culture       Gonorrhea       Chlamydia       TSH       2nd and 3rd Trimester Ref. Range Date Time   Hemoglobin (repeated)       Hematocrit (repeated)       GCT       Antibody Screen (repeated)       GTT Fasting       GTT 1 Hr       GTT 2 Hr       GTT 3 Hr       Group B Strep       Drug Screening Ref. Range Date Time   Amphetamine Screen       Barbiturate Screen       Benzodiazepine Screen       Methadone Screen        Phencyclidine Screen       Opiates Screen       THC Screen       Cocaine Screen       Propoxyphene Screen       Buprenorphine Screen       Methamphetamine Screen       Oxycodone Screen       Tryicyclic Antidepressants Screen       Other (Risk screening) Ref. Range Date Time   Varicella IgG       Parvovirus IgG       CMV IgG       Cystic Fibrosis       Hemoglobin electrophoresis       NIPT       MSAFP-4       AFP (for NTD only)                 Legend: ^: Historical                        Past OB History:     Obstetric History       T2      L0     SAB0   TAB0   Ectopic0   Molar0   Multiple0   Live Births0       # Outcome Date GA Lbr Farhad/2nd Weight Sex Delivery Anes PTL Lv   2 Term            1 Term                   Past Medical History: Past Medical History:   Diagnosis Date   • Depression    • Diabetes mellitus    • Diastasis recti    • Obesity       Past Surgical History Past Surgical History:   Procedure Laterality Date   • DILATATION AND CURETTAGE     • FOOT SURGERY Left     Plantar filuroma. 2013 and 2013   • KNEE ARTHROSCOPY     • WISDOM TOOTH EXTRACTION        Family History: Family History   Problem Relation Age of Onset   • Thyroid disease Mother    • Heart attack Maternal Grandmother    • Diabetes type II Maternal Grandfather    • Stroke Maternal Grandfather    • Heart attack Paternal Grandfather       Social History:  reports that she has never smoked. She has never used smokeless tobacco.   reports that she drinks alcohol.   reports that she does not use drugs.        Review of Systems.  Review of Systems -   Psychological ROS: negative  Ophthalmic ROS: negative  ENT ROS: negative  Allergy and Immunology ROS: negative  Hematological and Lymphatic ROS: negative  Endocrine ROS: negative  Breast ROS: negative for breast lumps  Respiratory ROS: no cough, shortness of breath, or wheezing  Cardiovascular ROS: no chest pain or dyspnea on exertion  Gastrointestinal ROS: no  abdominal pain, change in bowel habits, or black or bloody stools  Genito-Urinary ROS: positive for - R bartholin's gland abcess  Musculoskeletal ROS: negative  Neurological ROS: negative  Dermatological ROS: negative        Objective     Vital Signs Range for the last 24 hours  Temperature: Temp:  [99.1 °F (37.3 °C)-101.8 °F (38.8 °C)] 99.1 °F (37.3 °C)   Temp Source: Temp src: Oral   BP: BP: (117-146)/(40-89) 135/89   Pulse: Heart Rate:  [] 68   Respirations: Resp:  [18-20] 18   SPO2: SpO2:  [91 %-98 %] 93 %   O2 Amount (l/min):     O2 Devices Device (Oxygen Therapy): room air   Weight: Weight:  [88 kg (194 lb)] 88 kg (194 lb)     Physical Examination: General appearance - alert, well appearing, and in no distress, oriented to person, place, and time and overweight  Mental status - alert, oriented to person, place, and time, normal mood, behavior, speech, dress, motor activity, and thought processes, affect appropriate to mood  Chest - clear to auscultation, no wheezes, rales or rhonchi, symmetric air entry  Heart - normal rate, regular rhythm, normal S1, S2, no murmurs, rubs, clicks or gallops  Abdomen - soft, nontender, nondistended, no masses or organomegaly  Pelvic - VULVA: vulvar tenderness on R, vulvar edema on R, draining Bartholin's gland abcess on right with strong anaerobic odor. No erythema noted.  No cellulitis detected.    Back exam - full range of motion, no tenderness, palpable spasm or pain on motion  Neurological - alert, oriented, normal speech, no focal findings or movement disorder noted  Musculoskeletal - no joint tenderness, deformity or swelling  Extremities - peripheral pulses normal, no pedal edema, no clubbing or cyanosis    Laboratory Results: reviewed.   Radiology Review: none  Other Studies: none    Assessment/Plan     Active Problems:    Mixed hyperlipidemia    Type 2 diabetes mellitus without complication    Obesity (BMI 30.0-34.9)    Vitamin D deficiency    Depression     Abscess of vulva      Assessment & Plan    IMP:  Resolving  R bartholin's gland abcess.  Fever of 101.8, leukocytosis of 14K.    PLAN:  IV ancef, po flagyl, sitz baths.  Recheck labs in am.  Home tomorrow if continues to get better.    Satnam Jurado MD  4/19/2018  3:19 PM

## 2018-04-19 NOTE — ED PROVIDER NOTES
Subjective   History of Present Illness  History of Present Illness    Chief complaint: Vulvar abscess and fever    Location: Right vulva    Quality/Severity:  Severe    Timing/Duration: Symptoms began 2 days ago    Modifying Factors: Patient is known type II diabetic    Associated Symptoms: Swelling and spontaneous drainage    Narrative: The patient is a 57-year-old white female who presents as noted above.  The patient relates that 2 days ago she began to have some right vulvar discomfort and noticed a small tender swollen area.  Over the next 36 hours the area became more inflamed and tender.  This morning the suspected abscess began to spontaneously drain nathan pus.  The patient was seen by her primary care physician, Dr. Perez, in the office and referred here for further evaluation and probable admission.  History is significant for prior Bartholin gland marsupialization some 30 years ago and the patient thinks that this was on the right side.    Review of Systems   Constitutional: Positive for fatigue and fever. Negative for activity change and appetite change.   HENT: Negative for congestion.    Respiratory: Negative for cough, shortness of breath and wheezing.    Cardiovascular: Negative for chest pain, palpitations and leg swelling.   Gastrointestinal: Negative for abdominal pain, diarrhea, nausea and vomiting.   Endocrine: Negative for polydipsia.   Genitourinary: Positive for vaginal discharge and vaginal pain. Negative for difficulty urinating, dysuria, flank pain, frequency and urgency.   Musculoskeletal: Negative for back pain.   Skin: Negative for rash.   Neurological: Negative for dizziness, weakness and headaches.   Psychiatric/Behavioral: Negative for confusion.   All other systems reviewed and are negative.      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   • KNEE ARTHROSCOPY  2010   • WISDOM TOOTH EXTRACTION         Family History   Problem Relation Age of Onset   • Thyroid disease Mother    • Heart attack Maternal Grandmother    • Diabetes type II Maternal Grandfather    • Stroke Maternal Grandfather    • Heart attack Paternal Grandfather        Social History     Social History   • Marital status:      Social History Main Topics   • Smoking status: Never Smoker   • Smokeless tobacco: Never Used   • Alcohol use Yes      Comment: occasionally   • Drug use: No   • Sexual activity: Yes     Partners: Male     Other Topics Concern   • Not on file           Objective   Physical Exam   Constitutional: She is oriented to person, place, and time.   Patient is a pleasant, moderately obese, 57-year-old, white female in no acute distress.   HENT:   Head: Normocephalic and atraumatic.   Eyes: Conjunctivae and EOM are normal.   Neck: Normal range of motion. Neck supple. No thyromegaly present.   Cardiovascular: Regular rhythm and normal heart sounds.    No murmur heard.  Borderline tachycardia   Pulmonary/Chest: Effort normal and breath sounds normal. No respiratory distress. She has no wheezes. She has no rales.   Abdominal: Soft. Bowel sounds are normal. She exhibits no distension. There is no tenderness.   Genitourinary:   Genitourinary Comments: The right mons pubis was diffusely swollen and erythematous along with tenderness to palpation.  Medium brown pus was noted to be spontaneously draining somewhere near the right side of the introitus.  The erythema and swelling was noted to extend down and into the right perineal area.   Musculoskeletal: Normal range of motion. She exhibits no edema or tenderness.   Lymphadenopathy:     She has no cervical adenopathy.   Neurological: She is alert and oriented to person, place, and time.   Skin: Skin is warm and dry.  No rash noted.   Psychiatric: She has a normal mood and affect. Her behavior is normal.   Nursing note and vitals reviewed.      Procedures         ED Course  ED Course   Comment By Time   All findings discussed with patient and she is agreeable to GYN consult and possible surgery.  GYN has been paged. Malik Rodriguez MD 04/19 1134   Case and findings discussed with Dr. Satnam Jurado, gynecology, who agreed that the patient did warranted admission for IV antibiotics.  Cultures will be obtained. Malik Rodriguez MD 04/19 1141   Cultures obtained and sent to the lab.  Patient's vital signs remained stable and lactic acid 1.0.  Patient not felt to be septic at this time.  Antibiotics started in the emergency department. Malik Rodriguez MD 04/19 1155                  MDM  Number of Diagnoses or Management Options  Abscess of vulva: new and requires workup     Amount and/or Complexity of Data Reviewed  Clinical lab tests: ordered and reviewed  Discuss the patient with other providers: yes (Klaus Perez and Adrien)    Risk of Complications, Morbidity, and/or Mortality  Presenting problems: high  Diagnostic procedures: moderate  Management options: high    Critical Care  Total time providing critical care: < 30 minutes    Labs this visit  Lab Results (last 24 hours)     Procedure Component Value Units Date/Time    CBC & Differential [[id number]] Collected:  04/19/18 1029    Specimen:  Blood Updated:  04/19/18 1042    Narrative:       The following orders were created for panel order CBC & Differential.  Procedure                               Abnormality         Status                     ---------                               -----------         ------                     CBC Auto Differential[[id number]]        Abnormal            Final result                 Please view results for these tests on the individual orders.    Comprehensive Metabolic Panel [[id number]]  (Abnormal) Collected:  04/19/18 1029     Specimen:  Blood Updated:  04/19/18 1104     Glucose 184 (H) mg/dL      BUN 13 mg/dL      Creatinine 1.01 (H) mg/dL      Sodium 137 mmol/L      Potassium 4.2 mmol/L      Chloride 97 (L) mmol/L      CO2 24.1 mmol/L      Calcium 9.1 mg/dL      Total Protein 8.0 g/dL      Albumin 4.40 g/dL      ALT (SGPT) 14 U/L      AST (SGOT) 14 U/L      Alkaline Phosphatase 107 U/L      Total Bilirubin 0.4 mg/dL      eGFR Non African Amer 57 (L) mL/min/1.73      Globulin 3.6 gm/dL      A/G Ratio 1.2 g/dL      BUN/Creatinine Ratio 12.9     Anion Gap 15.9 mmol/L     Lactic Acid, Plasma [[id number]]  (Normal) Collected:  04/19/18 1029    Specimen:  Blood Updated:  04/19/18 1100     Lactate 1.0 mmol/L     Blood Culture - Blood, [[id number]] Collected:  04/19/18 1029    Specimen:  Blood from Arm, Right Updated:  04/19/18 1038    CBC Auto Differential [[id number]]  (Abnormal) Collected:  04/19/18 1029    Specimen:  Blood Updated:  04/19/18 1042     WBC 14.09 (H) 10*3/mm3      RBC 4.52 10*6/mm3      Hemoglobin 13.4 g/dL      Hematocrit 40.6 %      MCV 89.8 fL      MCH 29.6 pg      MCHC 33.0 g/dL      RDW 13.7 %      RDW-SD 44.5 fl      MPV 8.9 fL      Platelets 248 10*3/mm3      Neutrophil % 87.2 (H) %      Lymphocyte % 7.5 (L) %      Monocyte % 4.6 %      Eosinophil % 0.1 %      Basophil % 0.2 %      Immature Grans % 0.4 %      Neutrophils, Absolute 12.28 (H) 10*3/mm3      Lymphocytes, Absolute 1.06 10*3/mm3      Monocytes, Absolute 0.65 10*3/mm3      Eosinophils, Absolute 0.01 (L) 10*3/mm3      Basophils, Absolute 0.03 10*3/mm3      Immature Grans, Absolute 0.06 (H) 10*3/mm3      nRBC 0.0 /100 WBC     Blood Culture - Blood, [[id number]] Collected:  04/19/18 1056    Specimen:  Blood from Arm, Left Updated:  04/19/18 1100        Prescribed on discharge             Medication List      No changes were made to your prescriptions during this visit.       All lab results, imaging results and other tests were reviewed by Malik Rodriguez MD  and unless otherwise specified were found to be unremarkable.      Final diagnoses:   Abscess of vulva            Malik Rodriguez MD  04/19/18 3131

## 2018-04-19 NOTE — PROGRESS NOTES
"      Dionne Bond is a 57 y.o. female, who presents with a chief complaint of   Chief Complaint   Patient presents with   • Vaginal Pain     R side poss cyst per patient ruptured this AM, \"brown/red\" drainage, odor, swelling       58 yo F with Type 2 diabetes here with swollen gland in her right vaginal area since Tuesday. Started the size of a marble and then increased in size. She has had extreme pain. She has had chills. No fever that she knows of, but is febrile here.  Ruptured this morning and has had some relief. Discharge was brown in color. Had one of these 30 years ago. Very tired and fatigued.          The following portions of the patient's history were reviewed and updated as appropriate: allergies, current medications, past family history, past medical history, past social history, past surgical history and problem list.    Allergies: Review of patient's allergies indicates no known allergies.    Current Outpatient Prescriptions:   •  atorvastatin (LIPITOR) 40 MG tablet, Take 1 tablet by mouth Every Night., Disp: 90 tablet, Rfl: 3  •  Blood Glucose Monitoring Suppl (ONE TOUCH ULTRA SYSTEM KIT) W/DEVICE kit, Check blood sugar 3-4 times per day for first 3 days and then once per day thereafter, Disp: 1 each, Rfl: 0  •  cetirizine (zyrTEC) 10 MG tablet, Take 10 mg by mouth Daily., Disp: , Rfl:   •  Ergocalciferol 400 UNITS tablet, Take 2 tablets by mouth Daily., Disp: 30 tablet, Rfl: 6  •  glucose blood test strip, Use as instructed, Disp: 100 each, Rfl: 12  •  Linagliptin-Metformin HCl ER (JENTADUETO XR) 2.5-1000 MG tablet sustained-release 24 hour, Take 2 tablets by mouth Every Morning., Disp: 60 tablet, Rfl: 1  •  ONE TOUCH LANCETS misc, Use to check blood sugar 3 times per day for 3-4 days and then once per day, Disp: 200 each, Rfl: 11  •  sertraline (ZOLOFT) 50 MG tablet, Take 2 tablets by mouth Daily., Disp: 90 tablet, Rfl: 3  There are no discontinued medications.    Review of Systems " "  Constitutional: Positive for chills, fatigue and fever.   Gastrointestinal: Negative for diarrhea and vomiting.   Genitourinary: Positive for vaginal pain.   Skin: Positive for wound.             /84 (BP Location: Left arm, Patient Position: Sitting, Cuff Size: Adult)   Pulse 104   Temp (!) 100.7 °F (38.2 °C) (Oral)   Resp 20   Ht 167.6 cm (65.98\")   Wt 88 kg (194 lb)   SpO2 98%   BMI 31.33 kg/m²       Physical Exam   Constitutional: She is oriented to person, place, and time. She appears well-developed and well-nourished. No distress.   HENT:   Head: Normocephalic and atraumatic.   Right Ear: External ear normal.   Left Ear: External ear normal.   Mouth/Throat: Oropharynx is clear and moist. No oropharyngeal exudate.   Eyes: Conjunctivae are normal. Right eye exhibits no discharge. Left eye exhibits no discharge. No scleral icterus.   Neck: Neck supple.   Cardiovascular: Regular rhythm.    Pulmonary/Chest: Effort normal.   Genitourinary: Pelvic exam was performed with patient supine. There is lesion (5x5 cm bartholin gland abscess with surrounding erythema with cellulitis into rectal area. Brown/green discharge in vaginal area. No tract appreciated. ) on the right labia.   Lymphadenopathy:     She has no cervical adenopathy.   Neurological: She is alert and oriented to person, place, and time.   Skin: Skin is warm. No rash noted.   Psychiatric: She has a normal mood and affect. Her behavior is normal.   Nursing note and vitals reviewed.        Results for orders placed or performed in visit on 05/22/17   Comprehensive Metabolic Panel   Result Value Ref Range    Glucose 156 (H) 65 - 99 mg/dL    BUN 11 6 - 20 mg/dL    Creatinine 0.89 0.57 - 1.00 mg/dL    eGFR Non African Am 66 >60 mL/min/1.73    eGFR African Am 80 >60 mL/min/1.73    BUN/Creatinine Ratio 12.4 7.0 - 25.0    Sodium 141 136 - 145 mmol/L    Potassium 4.4 3.5 - 5.2 mmol/L    Chloride 102 98 - 107 mmol/L    Total CO2 25.5 22.0 - 29.0 mmol/L "    Calcium 9.2 8.6 - 10.5 mg/dL    Total Protein 6.6 6.0 - 8.5 g/dL    Albumin 4.20 3.50 - 5.20 g/dL    Globulin 2.4 gm/dL    A/G Ratio 1.8 g/dL    Total Bilirubin <0.2 (L) 0.2 - 1.2 mg/dL    Alkaline Phosphatase 94 40 - 129 U/L    AST (SGOT) 15 5 - 32 U/L    ALT (SGPT) 16 5 - 33 U/L   Vitamin D 25 Hydroxy   Result Value Ref Range    25 Hydroxy, Vitamin D 23.5 ng/mL   Hemoglobin A1c   Result Value Ref Range    Hemoglobin A1C 6.60 (H) 4.80 - 5.60 %           Dionne was seen today for vaginal pain.    Diagnoses and all orders for this visit:    Bartholin's gland abscess    Cellulitis of buttock    Fever and chills    Tachycardia        Sent to ER after speaking with Dr. Spicer regarding abscess and constitutional symptoms with cellulitis   Dr. Rodriguez in ER also aware   Will triage in ER and determine if I&D with IV antibiotics vs antibiotics only is warranted   Patient understands and is on her way   Return Go to ER immediately as we discussed , for Recheck.    Maria L Perez MD  04/19/2018

## 2018-04-20 VITALS
TEMPERATURE: 98.6 F | SYSTOLIC BLOOD PRESSURE: 138 MMHG | RESPIRATION RATE: 18 BRPM | DIASTOLIC BLOOD PRESSURE: 74 MMHG | WEIGHT: 194 LBS | HEIGHT: 66 IN | HEART RATE: 60 BPM | BODY MASS INDEX: 31.18 KG/M2 | OXYGEN SATURATION: 98 %

## 2018-04-20 LAB
ALBUMIN SERPL-MCNC: 4 G/DL (ref 3.5–5.2)
ALBUMIN/GLOB SERPL: 1.3 G/DL
ALP SERPL-CCNC: 77 U/L (ref 40–129)
ALT SERPL W P-5'-P-CCNC: 10 U/L (ref 5–33)
ANION GAP SERPL CALCULATED.3IONS-SCNC: 12.4 MMOL/L
AST SERPL-CCNC: 13 U/L (ref 5–32)
BASOPHILS # BLD AUTO: 0.02 10*3/MM3 (ref 0–0.2)
BASOPHILS NFR BLD AUTO: 0.2 % (ref 0–2)
BILIRUB SERPL-MCNC: 0.3 MG/DL (ref 0.2–1.2)
BUN BLD-MCNC: 13 MG/DL (ref 6–20)
BUN/CREAT SERPL: 14 (ref 7–25)
CALCIUM SPEC-SCNC: 9.1 MG/DL (ref 8.6–10.5)
CHLORIDE SERPL-SCNC: 101 MMOL/L (ref 98–107)
CO2 SERPL-SCNC: 25.6 MMOL/L (ref 22–29)
CREAT BLD-MCNC: 0.93 MG/DL (ref 0.57–1)
DEPRECATED RDW RBC AUTO: 44.8 FL (ref 37–54)
EOSINOPHIL # BLD AUTO: 0.06 10*3/MM3 (ref 0.1–0.3)
EOSINOPHIL NFR BLD AUTO: 0.7 % (ref 0–4)
ERYTHROCYTE [DISTWIDTH] IN BLOOD BY AUTOMATED COUNT: 13.7 % (ref 11.5–14.5)
GFR SERPL CREATININE-BSD FRML MDRD: 62 ML/MIN/1.73
GLOBULIN UR ELPH-MCNC: 3.2 GM/DL
GLUCOSE BLD-MCNC: 138 MG/DL (ref 65–99)
HBA1C MFR BLD: 6.7 % (ref 4.8–5.6)
HCT VFR BLD AUTO: 37.8 % (ref 37–47)
HGB BLD-MCNC: 12.5 G/DL (ref 12–16)
IMM GRANULOCYTES # BLD: 0.01 10*3/MM3 (ref 0–0.03)
IMM GRANULOCYTES NFR BLD: 0.1 % (ref 0–0.5)
LYMPHOCYTES # BLD AUTO: 2.08 10*3/MM3 (ref 0.6–4.8)
LYMPHOCYTES NFR BLD AUTO: 22.6 % (ref 20–45)
MCH RBC QN AUTO: 29.8 PG (ref 27–31)
MCHC RBC AUTO-ENTMCNC: 33.1 G/DL (ref 31–37)
MCV RBC AUTO: 90.2 FL (ref 81–99)
MONOCYTES # BLD AUTO: 0.64 10*3/MM3 (ref 0–1)
MONOCYTES NFR BLD AUTO: 7 % (ref 3–8)
NEUTROPHILS # BLD AUTO: 6.38 10*3/MM3 (ref 1.5–8.3)
NEUTROPHILS NFR BLD AUTO: 69.4 % (ref 45–70)
NRBC BLD MANUAL-RTO: 0 /100 WBC (ref 0–0)
PLATELET # BLD AUTO: 223 10*3/MM3 (ref 140–500)
PMV BLD AUTO: 9 FL (ref 7.4–10.4)
POTASSIUM BLD-SCNC: 4.1 MMOL/L (ref 3.5–5.2)
PROT SERPL-MCNC: 7.2 G/DL (ref 6–8.5)
RBC # BLD AUTO: 4.19 10*6/MM3 (ref 4.2–5.4)
SODIUM BLD-SCNC: 139 MMOL/L (ref 136–145)
WBC NRBC COR # BLD: 9.19 10*3/MM3 (ref 4.8–10.8)

## 2018-04-20 PROCEDURE — 83036 HEMOGLOBIN GLYCOSYLATED A1C: CPT | Performed by: OBSTETRICS & GYNECOLOGY

## 2018-04-20 PROCEDURE — 85025 COMPLETE CBC W/AUTO DIFF WBC: CPT | Performed by: OBSTETRICS & GYNECOLOGY

## 2018-04-20 PROCEDURE — 99217 PR OBSERVATION CARE DISCHARGE MANAGEMENT: CPT | Performed by: OBSTETRICS & GYNECOLOGY

## 2018-04-20 PROCEDURE — G0378 HOSPITAL OBSERVATION PER HR: HCPCS

## 2018-04-20 PROCEDURE — 25010000003 CEFAZOLIN PER 500 MG: Performed by: OBSTETRICS & GYNECOLOGY

## 2018-04-20 PROCEDURE — 80053 COMPREHEN METABOLIC PANEL: CPT | Performed by: OBSTETRICS & GYNECOLOGY

## 2018-04-20 RX ORDER — METRONIDAZOLE 500 MG/1
500 TABLET ORAL EVERY 12 HOURS
Qty: 13 TABLET | Refills: 0 | Status: SHIPPED | OUTPATIENT
Start: 2018-04-20 | End: 2018-04-27

## 2018-04-20 RX ORDER — SODIUM CHLORIDE 9 MG/ML
INJECTION, SOLUTION INTRAVENOUS
Status: DISCONTINUED
Start: 2018-04-20 | End: 2018-04-20 | Stop reason: HOSPADM

## 2018-04-20 RX ORDER — CEFAZOLIN SODIUM 1 G/3ML
INJECTION, POWDER, FOR SOLUTION INTRAMUSCULAR; INTRAVENOUS
Status: DISCONTINUED
Start: 2018-04-20 | End: 2018-04-20 | Stop reason: HOSPADM

## 2018-04-20 RX ADMIN — METRONIDAZOLE 500 MG: 500 TABLET ORAL at 12:34

## 2018-04-20 RX ADMIN — CEFAZOLIN 1 G: 1 INJECTION, POWDER, FOR SOLUTION INTRAMUSCULAR; INTRAVENOUS at 11:54

## 2018-04-20 RX ADMIN — CEFAZOLIN 1 G: 1 INJECTION, POWDER, FOR SOLUTION INTRAMUSCULAR; INTRAVENOUS at 04:35

## 2018-04-20 NOTE — DISCHARGE SUMMARY
Date of Admission: 4/19/2018 10:05 AM      Date of Discharge:  4/20/2018    Admitting Service: [facility]    Admission Diagnosis: Bartholin's gland abscess  Discharge Diagnosis: Same    Presenting Problem/History of Present Illness  Abscess of vulva [N76.4]  Abscess of vulva [N76.4]       Hospital Course  Patient is a 57 y.o. female presented with Bartholin's gland abscess.  She was admitted with an elevated white count and a fever of 101.8.  She received IV Kefzol and Flagyl.  The abscess had ruptured and is draining.  By hospital day #2 her white count has dropped and she has not had any fevers.  White blood cell count is normal again and the patient feels well.  On exam the area has no induration and no erythema and she is prepared for discharge.      Procedures Performed         Consults:   Consults     No orders found for last 30 day(s).          Pertinent Test Results: White count was elevated upon admission but normalized by hospital day #2.    Condition on Discharge:  Stable    Vital Signs  Temp:  [98.3 °F (36.8 °C)-99.1 °F (37.3 °C)] 98.6 °F (37 °C)  Heart Rate:  [60-85] 60  Resp:  [18] 18  BP: (117-141)/(66-89) 138/74    Physical Exam:   Abdomen is soft, nontender, nondistended.  Vulva is normal with no induration or erythema.  Small amount of drainage noted from the right Bartholin's area.    Discharge Disposition  Home or Self Care    Discharge Medications   Dionne Bond   Home Medication Instructions KURT:[id number]    Printed on:04/20/18 1142   Medication Information                      atorvastatin (LIPITOR) 40 MG tablet  Take 1 tablet by mouth Every Night.             Blood Glucose Monitoring Suppl (ONE TOUCH ULTRA SYSTEM KIT) W/DEVICE kit  Check blood sugar 3-4 times per day for first 3 days and then once per day thereafter             cetirizine (zyrTEC) 10 MG tablet  Take 10 mg by mouth Daily.             Ergocalciferol 400 UNITS tablet  Take 2 tablets by mouth Daily.             glucose blood test  strip  Use as instructed             Linagliptin-Metformin HCl ER (JENTADUETO XR) 2.5-1000 MG tablet sustained-release 24 hour  Take 2 tablets by mouth Every Morning.             metroNIDAZOLE (FLAGYL) 500 MG tablet  Take 1 tablet by mouth Every 12 (Twelve) Hours for 13 doses.             ONE TOUCH LANCETS misc  Use to check blood sugar 3 times per day for 3-4 days and then once per day             sertraline (ZOLOFT) 50 MG tablet  Take 2 tablets by mouth Daily.                 Discharge Diet:   Diet Instructions     Diet: Consistent Carbohydrate; Thin       Discharge Diet:  Consistent Carbohydrate    Fluid Consistency:  Thin          Activity at Discharge:   Activity Instructions     Activity as Tolerated       Pelvic Rest             Follow-up Appointments  Future Appointments  Date Time Provider Department Center   4/24/2018 9:50 AM [facility] None   5/1/2018 8:40 AM Maria L Perez MD [facility] None     Additional Instructions for the Follow-ups that You Need to Schedule     Discharge Follow-up with Specialty: Dr Jurado; 1 Week    As directed      Specialty:  Dr Jurado    Follow Up:  1 Week               Test Results Pending at Discharge   Order Current Status    Anaerobic Culture - Drainage, Vulva In process    Blood Culture - Blood, Preliminary result    Blood Culture - Blood, Preliminary result    Wound Culture - Drainage, Vulva Preliminary result           Malik Luciano MD  04/20/18  11:42 AM    Time: 11:44

## 2018-04-20 NOTE — PLAN OF CARE
Problem: Patient Care Overview  Goal: Plan of Care Review  Outcome: Ongoing (interventions implemented as appropriate)   04/20/18 0314   Coping/Psychosocial   Plan of Care Reviewed With patient   OTHER   Outcome Summary Diabetic diet, IV antibotics, PO fluids, pending cultures, possible home in am   Plan of Care Review   Progress improving     Goal: Individualization and Mutuality  Outcome: Ongoing (interventions implemented as appropriate)    Goal: Discharge Needs Assessment  Outcome: Ongoing (interventions implemented as appropriate)      Problem: Pain, Acute (Adult)  Goal: Identify Related Risk Factors and Signs and Symptoms  Outcome: Ongoing (interventions implemented as appropriate)    Goal: Acceptable Pain Control/Comfort Level  Outcome: Ongoing (interventions implemented as appropriate)      Problem: Fall Risk (Adult)  Goal: Identify Related Risk Factors and Signs and Symptoms  Outcome: Outcome(s) achieved Date Met: 04/20/18    Goal: Absence of Fall  Outcome: Outcome(s) achieved Date Met: 04/20/18      Problem: Infection, Risk/Actual (Adult)  Goal: Identify Related Risk Factors and Signs and Symptoms  Outcome: Outcome(s) achieved Date Met: 04/20/18    Goal: Infection Prevention/Resolution  Outcome: Outcome(s) achieved Date Met: 04/20/18      Problem: Diabetes, Type 2 (Adult)  Goal: Signs and Symptoms of Listed Potential Problems Will be Absent, Minimized or Managed (Diabetes, Type 2)  Outcome: Ongoing (interventions implemented as appropriate)

## 2018-04-21 LAB
BACTERIA SPEC AEROBE CULT: ABNORMAL
GRAM STN SPEC: ABNORMAL

## 2018-04-23 DIAGNOSIS — E11.9 TYPE 2 DIABETES MELLITUS WITHOUT COMPLICATION, WITHOUT LONG-TERM CURRENT USE OF INSULIN (HCC): ICD-10-CM

## 2018-04-23 DIAGNOSIS — Z00.00 ROUTINE ADULT HEALTH MAINTENANCE: Primary | ICD-10-CM

## 2018-04-23 DIAGNOSIS — E78.2 MIXED HYPERLIPIDEMIA: ICD-10-CM

## 2018-04-23 DIAGNOSIS — E55.9 VITAMIN D DEFICIENCY: ICD-10-CM

## 2018-04-23 DIAGNOSIS — Z83.49 FAMILY HISTORY OF THYROID DISEASE: ICD-10-CM

## 2018-04-24 LAB
BACTERIA SPEC AEROBE CULT: NORMAL
BACTERIA SPEC AEROBE CULT: NORMAL
BACTERIA SPEC ANAEROBE CULT: NORMAL

## 2018-04-25 ENCOUNTER — RESULTS ENCOUNTER (OUTPATIENT)
Dept: INTERNAL MEDICINE | Facility: CLINIC | Age: 58
End: 2018-04-25

## 2018-04-25 ENCOUNTER — OFFICE VISIT (OUTPATIENT)
Dept: OBSTETRICS AND GYNECOLOGY | Facility: CLINIC | Age: 58
End: 2018-04-25

## 2018-04-25 VITALS
WEIGHT: 201 LBS | BODY MASS INDEX: 32.3 KG/M2 | DIASTOLIC BLOOD PRESSURE: 82 MMHG | SYSTOLIC BLOOD PRESSURE: 122 MMHG | HEIGHT: 66 IN

## 2018-04-25 DIAGNOSIS — N76.4 ABSCESS OF VULVA: Primary | ICD-10-CM

## 2018-04-25 DIAGNOSIS — E55.9 VITAMIN D DEFICIENCY: ICD-10-CM

## 2018-04-25 DIAGNOSIS — Z83.49 FAMILY HISTORY OF THYROID DISEASE: ICD-10-CM

## 2018-04-25 DIAGNOSIS — Z00.00 ROUTINE ADULT HEALTH MAINTENANCE: ICD-10-CM

## 2018-04-25 DIAGNOSIS — E11.9 TYPE 2 DIABETES MELLITUS WITHOUT COMPLICATION, WITHOUT LONG-TERM CURRENT USE OF INSULIN (HCC): ICD-10-CM

## 2018-04-25 DIAGNOSIS — E78.2 MIXED HYPERLIPIDEMIA: ICD-10-CM

## 2018-04-25 PROCEDURE — 99213 OFFICE O/P EST LOW 20 MIN: CPT | Performed by: OBSTETRICS & GYNECOLOGY

## 2018-04-25 RX ORDER — FLUCONAZOLE 150 MG/1
150 TABLET ORAL ONCE
Qty: 1 TABLET | Refills: 0 | Status: SHIPPED | OUTPATIENT
Start: 2018-04-25 | End: 2018-04-25

## 2018-04-25 NOTE — PROGRESS NOTES
"      Dionne Bond is a 57 y.o. patient who presents for follow up of   Chief Complaint   Patient presents with   • Follow-up     er cyst       HPI patient is 1 week status post hospitalization for ruptured left Bartholin's cyst.  Upon admission she had fever to 101.  She received IV antibiotics and then was switched to oral upon discharge.  She is been doing quite well.  She feels completely normal again.  She's had no fevers and she has to antibiotics left.  She complains of vaginal itching likely related to the multiple antibiotic she is received.  She feels like it's a yeast infection.    The following portions of the patient's history were reviewed and updated as appropriate: allergies, current medications and problem list.    Review of Systems   Constitutional: Negative for chills and fever.   Gastrointestinal: Negative for abdominal pain and diarrhea.   Genitourinary: Positive for vaginal discharge. Negative for pelvic pain.       /82   Ht 167.6 cm (66\")   Wt 91.2 kg (201 lb)   LMP  (LMP Unknown)   Breastfeeding? No   BMI 32.44 kg/m²     Physical Exam   Constitutional: She is oriented to person, place, and time. She appears well-developed and well-nourished.   Genitourinary: There is no rash, tenderness or lesion on the right labia. There is no rash, tenderness or lesion on the left labia.   Neurological: She is alert and oriented to person, place, and time.   Psychiatric: She has a normal mood and affect. Her behavior is normal. Judgment and thought content normal.   Nursing note and vitals reviewed.        Assessment/Plan    Dionne was seen today for follow-up.    Diagnoses and all orders for this visit:    Abscess of vulva    Other orders  -     fluconazole (DIFLUCAN) 150 MG tablet; Take 1 tablet by mouth 1 (One) Time for 1 dose.    Looks much better well-healed.  Start Diflucan for yeast.    Return if symptoms worsen or fail to improve.      Malik Luciano MD  4/25/2018  2:45 PM  "

## 2018-04-29 LAB
25(OH)D3+25(OH)D2 SERPL-MCNC: 26.5 NG/ML
APPEARANCE UR: ABNORMAL
BACTERIA #/AREA URNS HPF: ABNORMAL /HPF
BACTERIA UR CULT: ABNORMAL
BILIRUB UR QL STRIP: NEGATIVE
CHOLEST SERPL-MCNC: 159 MG/DL (ref 0–200)
COLOR UR: YELLOW
CRYSTALS URNS MICRO: ABNORMAL
EPI CELLS #/AREA URNS HPF: ABNORMAL /HPF
GLUCOSE UR QL: NEGATIVE
HDLC SERPL-MCNC: 58 MG/DL (ref 40–60)
HGB UR QL STRIP: ABNORMAL
KETONES UR QL STRIP: NEGATIVE
LDLC SERPL CALC-MCNC: 77 MG/DL (ref 0–100)
LDLC/HDLC SERPL: 1.33 {RATIO}
LEUKOCYTE ESTERASE UR QL STRIP: ABNORMAL
MICRO URNS: ABNORMAL
MUCOUS THREADS URNS QL MICRO: PRESENT /HPF
NITRITE UR QL STRIP: NEGATIVE
OTHER ANTIBIOTIC SUSC ISLT: ABNORMAL
PH UR STRIP: 5 [PH] (ref 5–7.5)
PROT UR QL STRIP: NEGATIVE
RBC #/AREA URNS HPF: ABNORMAL /HPF
SP GR UR: 1.02 (ref 1–1.03)
T3FREE SERPL-MCNC: 3.1 PG/ML (ref 2–4.4)
T3REVERSE SERPL-MCNC: 17.9 NG/DL (ref 9.2–24.1)
T4 FREE SERPL-MCNC: 0.94 NG/DL (ref 0.93–1.7)
TRIGL SERPL-MCNC: 119 MG/DL (ref 0–150)
TSH SERPL DL<=0.005 MIU/L-ACNC: 1.49 MIU/ML (ref 0.27–4.2)
UNIDENT CRYS URNS QL MICRO: PRESENT
URINALYSIS REFLEX: ABNORMAL
UROBILINOGEN UR STRIP-MCNC: 0.2 MG/DL (ref 0.2–1)
VLDLC SERPL CALC-MCNC: 23.8 MG/DL (ref 7–27)
WBC #/AREA URNS HPF: >30 /HPF

## 2018-04-30 ENCOUNTER — RESULTS ENCOUNTER (OUTPATIENT)
Dept: INTERNAL MEDICINE | Facility: CLINIC | Age: 58
End: 2018-04-30

## 2018-04-30 DIAGNOSIS — E78.2 MIXED HYPERLIPIDEMIA: ICD-10-CM

## 2018-04-30 DIAGNOSIS — E11.9 TYPE 2 DIABETES MELLITUS WITHOUT COMPLICATION, WITHOUT LONG-TERM CURRENT USE OF INSULIN (HCC): ICD-10-CM

## 2018-04-30 DIAGNOSIS — Z83.49 FAMILY HISTORY OF THYROID DISEASE: ICD-10-CM

## 2018-04-30 DIAGNOSIS — Z00.00 ROUTINE ADULT HEALTH MAINTENANCE: ICD-10-CM

## 2018-04-30 NOTE — PROGRESS NOTES
Will review tomorrow with patient. Recent bartholin's abscess and will discuss urine culture results with her.

## 2018-05-01 ENCOUNTER — OFFICE VISIT (OUTPATIENT)
Dept: INTERNAL MEDICINE | Facility: CLINIC | Age: 58
End: 2018-05-01

## 2018-05-01 VITALS
SYSTOLIC BLOOD PRESSURE: 124 MMHG | OXYGEN SATURATION: 98 % | HEIGHT: 66 IN | TEMPERATURE: 98.9 F | DIASTOLIC BLOOD PRESSURE: 76 MMHG | BODY MASS INDEX: 31.82 KG/M2 | WEIGHT: 198 LBS | HEART RATE: 79 BPM | RESPIRATION RATE: 18 BRPM

## 2018-05-01 DIAGNOSIS — R30.0 DYSURIA: ICD-10-CM

## 2018-05-01 DIAGNOSIS — Z00.00 ENCOUNTER FOR ANNUAL PHYSICAL EXAM: Primary | ICD-10-CM

## 2018-05-01 DIAGNOSIS — E66.9 OBESITY (BMI 30.0-34.9): ICD-10-CM

## 2018-05-01 DIAGNOSIS — E55.9 VITAMIN D DEFICIENCY: ICD-10-CM

## 2018-05-01 DIAGNOSIS — E78.2 MIXED HYPERLIPIDEMIA: ICD-10-CM

## 2018-05-01 DIAGNOSIS — Z12.11 COLON CANCER SCREENING: ICD-10-CM

## 2018-05-01 DIAGNOSIS — F32.A DEPRESSION, UNSPECIFIED DEPRESSION TYPE: ICD-10-CM

## 2018-05-01 DIAGNOSIS — R82.90 ABNORMAL URINE FINDINGS: ICD-10-CM

## 2018-05-01 DIAGNOSIS — E11.9 TYPE 2 DIABETES MELLITUS WITHOUT COMPLICATION, WITHOUT LONG-TERM CURRENT USE OF INSULIN (HCC): ICD-10-CM

## 2018-05-01 PROCEDURE — 99213 OFFICE O/P EST LOW 20 MIN: CPT | Performed by: INTERNAL MEDICINE

## 2018-05-01 PROCEDURE — 90471 IMMUNIZATION ADMIN: CPT | Performed by: INTERNAL MEDICINE

## 2018-05-01 PROCEDURE — 99396 PREV VISIT EST AGE 40-64: CPT | Performed by: INTERNAL MEDICINE

## 2018-05-01 PROCEDURE — 90715 TDAP VACCINE 7 YRS/> IM: CPT | Performed by: INTERNAL MEDICINE

## 2018-05-01 NOTE — PROGRESS NOTES
Patient Name: Dionne Truong is a 57 y.o. female presenting for Annual Exam (CPE, lab results )    She is doing well. She is walking 5 days per week and is active. She was on keto diet and lost weight, but is now more on low carb. She is eating yogurt, fruits and vegetables. Drinking water.    She is not up to date on her eye exam. Going to have done soon either here or [facility]. Foot exam done today. She is doing well on Jentadueto. Last HgA1c was 6.7%. She is on statin and tolerating well. Reviewed her cholesterol levels with her today.     Insurance did not pay for cologuard and is wanting to get c-scope before she leaves town. Has never had screening. Needs to have pap done. Mammogram is up to date. Normal in 2017 and due in .     She is doing well after her abscess. Completed antibiotics. WBC has gone down. No fever or pain. Does have some dysuria at the end of her stream. No blood in her urine. Culture was positive, but not significant amount of colonies to treat.     Well Adult Physical   Patient here for a comprehensive physical exam.The patient reports DM Type 2, HLD, depression, follow up of abscess    Do you take any herbs or supplements that were not prescribed by a doctor? no   Are you taking calcium supplements? no   Are you taking aspirin daily? no     History:  LMP: No LMP recorded (lmp unknown).  Last pap date:many years ago, not up to date   Abnormal pap? no  : 2  Para: 2    Dionne Bond 57 y.o. female who presents for an Annual Wellness Visit.  she has a history of   Patient Active Problem List   Diagnosis   • Mixed hyperlipidemia   • Type 2 diabetes mellitus without complication   • Obesity (BMI 30.0-34.9)   • Vitamin D deficiency   • Depression   • Abscess of vulva       Health Habits:  Dental Exam. up to date  Eye Exam. not up to date - needs to schedule, due in 2018   Exercise: 5 times/week.  Current exercise activities include: walking    Menstrual  history: post-menopausal   Colonoscopy:needs to schedule. Referral sent today   Tob use:N/A  Qualifies for lung Ca screening?N/A       The following portions of the patient's history were reviewed and updated as appropriate: allergies, current medications, past family history, past medical history, past social history, past surgical history and problem list.    Review of Systems   Constitutional: Negative for chills, fatigue and fever.   HENT: Negative for congestion, rhinorrhea and sneezing.    Respiratory: Negative for cough and shortness of breath.    Cardiovascular: Negative for chest pain and palpitations.   Gastrointestinal: Negative for abdominal pain, diarrhea, nausea and vomiting.   Genitourinary: Positive for dysuria.   Skin: Positive for wound (well healing per patient ).   Neurological: Negative for dizziness.   Psychiatric/Behavioral: Negative for decreased concentration and dysphoric mood.       Review of patient's allergies indicates no known allergies.      Current Outpatient Prescriptions:   •  atorvastatin (LIPITOR) 40 MG tablet, Take 1 tablet by mouth Every Night., Disp: 90 tablet, Rfl: 3  •  Blood Glucose Monitoring Suppl (ONE TOUCH ULTRA SYSTEM KIT) W/DEVICE kit, Check blood sugar 3-4 times per day for first 3 days and then once per day thereafter, Disp: 1 each, Rfl: 0  •  cetirizine (zyrTEC) 10 MG tablet, Take 10 mg by mouth Daily., Disp: , Rfl:   •  Ergocalciferol 400 UNITS tablet, Take 2 tablets by mouth Daily., Disp: 30 tablet, Rfl: 6  •  glucose blood test strip, Use as instructed, Disp: 100 each, Rfl: 12  •  Linagliptin-Metformin HCl ER (JENTADUETO XR) 2.5-1000 MG tablet sustained-release 24 hour, Take 2 tablets by mouth Every Morning., Disp: 180 tablet, Rfl: 3  •  ONE TOUCH LANCETS misc, Use to check blood sugar 3 times per day for 3-4 days and then once per day, Disp: 200 each, Rfl: 11  •  sertraline (ZOLOFT) 50 MG tablet, Take 2 tablets by mouth Daily., Disp: 90 tablet, Rfl:  "3    OBJECTIVE    /76 (BP Location: Left arm, Patient Position: Sitting, Cuff Size: Adult)   Pulse 79   Temp 98.9 °F (37.2 °C) (Temporal Artery )   Resp 18   Ht 167.6 cm (65.98\")   Wt 89.8 kg (198 lb)   LMP  (LMP Unknown)   SpO2 98%   BMI 31.97 kg/m²     Physical Exam   Constitutional: She is oriented to person, place, and time. She appears well-developed and well-nourished. No distress.   HENT:   Head: Normocephalic and atraumatic.   Right Ear: Hearing, tympanic membrane and external ear normal.   Left Ear: Hearing, tympanic membrane and external ear normal.   Nose: Nose normal. Right sinus exhibits no frontal sinus tenderness. Left sinus exhibits no frontal sinus tenderness.   Mouth/Throat: Uvula is midline and mucous membranes are normal. Posterior oropharyngeal erythema present. No oropharyngeal exudate or posterior oropharyngeal edema. Tonsils are 0 on the right. Tonsils are 0 on the left. No tonsillar exudate.   Eyes: Conjunctivae are normal. Right eye exhibits no discharge. Left eye exhibits no discharge. No scleral icterus.   Neck: Neck supple.   Cardiovascular: Normal rate, regular rhythm and normal heart sounds.  Exam reveals no gallop and no friction rub.    No murmur heard.  Pulmonary/Chest: Effort normal and breath sounds normal. No respiratory distress. She has no wheezes. She has no rales.   Abdominal: Soft. Bowel sounds are normal. She exhibits no distension and no mass. There is no tenderness. There is no guarding.   Musculoskeletal: She exhibits no edema.    Dionne had a diabetic foot exam performed today.   During the foot exam she had a monofilament test performed.    Neurological Sensory Findings - Unaltered hot/cold right ankle/foot discrimination and unaltered hot/cold left ankle/foot discrimination. Unaltered sharp/dull right ankle/foot discrimination and unaltered sharp/dull left ankle/foot discrimination. No right ankle/foot altered proprioception and no left ankle/foot " altered proprioception  Vascular Status -  Her right foot exhibits normal foot vasculature  and no edema. Her left foot exhibits normal foot vasculature  and no edema.  Skin Integrity  -  Her right foot skin is intact.Her left foot skin is intact..  Lymphadenopathy:     She has no cervical adenopathy.   Neurological: She is alert and oriented to person, place, and time. She exhibits normal muscle tone.   Skin: Skin is warm. No rash noted.   Psychiatric: She has a normal mood and affect. Her behavior is normal. Thought content normal.   Nursing note and vitals reviewed.      ASSESSMENT AND PLAN    Tdap given today     I want Dionne to begin a progressive daily aerobic exercise program, follow a low fat, low cholesterol diet, attempt to lose weight, decrease or avoid alcohol intake, reduce salt in diet and cooking, reduce exposure to stress, continue current medications and return for routine annual checkups.     Patient's type 2 diabetes is under good control . Will continue current regimen of Jentadueto.No reported side effects from medications. Will follow up in 6 months     Patient's cholesterol is under good control. Will continue current regimen of Lipitor. No side effects from medications reported. Will follow up in 6 months to monitor levels.     Depression is stable on Zoloft. Will continue at current dose and needs follow up with provider in [place] in 6-12 months.     Continue current dose of Vitamin D as her level is 26. Needs to have rechecked in 6 months.     Stressed importance of getting her eye exam, pap and c-scope. She has been reluctant to follow through with things in the past due to work and cost and now that her insurance is better, she is going to do so. She is going to call Ob/gyn office to schedule pap and I have made referral to [facility] for c-scope with Dr. Trinidad and she states that she will have done in July. She knows that she also needs Hep C screening at some point as well.       Dionne was  seen today for annual exam.    Diagnoses and all orders for this visit:    Encounter for annual physical exam    Mixed hyperlipidemia    Obesity (BMI 30.0-34.9)    Type 2 diabetes mellitus without complication, without long-term current use of insulin    Vitamin D deficiency    Depression, unspecified depression type    Abnormal urine findings  -     Urinalysis With / Culture If Indicated - Urine, Clean Catch  -     Microalbumin / Creatinine Urine Ratio - Urine, Clean Catch    Colon cancer screening  -     Ambulatory Referral For Screening Colonoscopy    Dysuria    Other orders  -     Linagliptin-Metformin HCl ER (JENTADUETO XR) 2.5-1000 MG tablet sustained-release 24 hour; Take 2 tablets by mouth Every Morning.  -     Tdap Vaccine Greater Than or Equal To 8yo IM            Return for Recheck of labs and establish care with doctor in [place] in 11/2018.

## 2018-05-02 LAB
ALBUMIN/CREAT UR: <2.2 MG/G CREAT (ref 0–30)
APPEARANCE UR: CLEAR
BACTERIA #/AREA URNS HPF: ABNORMAL /HPF
BILIRUB UR QL STRIP: NEGATIVE
COLOR UR: YELLOW
CREAT UR-MCNC: 133.4 MG/DL
CRYSTALS URNS MICRO: ABNORMAL
EPI CELLS #/AREA URNS HPF: ABNORMAL /HPF
GLUCOSE UR QL: NEGATIVE
HGB UR QL STRIP: NEGATIVE
KETONES UR QL STRIP: NEGATIVE
LEUKOCYTE ESTERASE UR QL STRIP: NEGATIVE
MICRO URNS: NORMAL
MICRO URNS: NORMAL
MICROALBUMIN UR-MCNC: <3 UG/ML
MUCOUS THREADS URNS QL MICRO: PRESENT /HPF
NITRITE UR QL STRIP: NEGATIVE
PH UR STRIP: 5 [PH] (ref 5–7.5)
PROT UR QL STRIP: NEGATIVE
RBC #/AREA URNS HPF: ABNORMAL /HPF
SP GR UR: 1.02 (ref 1–1.03)
UNIDENT CRYS URNS QL MICRO: PRESENT
URINALYSIS REFLEX: NORMAL
UROBILINOGEN UR STRIP-MCNC: 0.2 MG/DL (ref 0.2–1)
WBC #/AREA URNS HPF: ABNORMAL /HPF

## 2018-05-03 NOTE — PROGRESS NOTES
Repeat urine with better collection looks great. No culture sent since it looked so good. How is she doing? Still having some burning or has that resolved?

## 2018-05-04 ENCOUNTER — TELEPHONE (OUTPATIENT)
Dept: INTERNAL MEDICINE | Facility: CLINIC | Age: 58
End: 2018-05-04

## 2018-05-04 ENCOUNTER — TELEPHONE (OUTPATIENT)
Dept: SURGERY | Facility: CLINIC | Age: 58
End: 2018-05-04

## 2018-05-04 NOTE — TELEPHONE ENCOUNTER
Left message for patient to call back for results.   ----- Message from Maria L Perez MD sent at 5/3/2018 10:22 AM EDT -----  Repeat urine with better collection looks great. No culture sent since it looked so good. How is she doing? Still having some burning or has that resolved?

## 2018-05-04 NOTE — TELEPHONE ENCOUNTER
----- Message from Maria L Perez MD sent at 5/3/2018 10:22 AM EDT -----  Repeat urine with better collection looks great. No culture sent since it looked so good. How is she doing? Still having some burning or has that resolved?    Pt given results.dg

## 2018-05-04 NOTE — TELEPHONE ENCOUNTER
----- Message from Nicky Bray MA sent at 5/3/2018  4:18 PM EDT -----  Regarding: FAST TRACK  PLEASE SCHEDULE  ----- Message -----  From: Annette Trinidad MD  Sent: 5/3/2018   3:12 PM  To: Nicky Bray MA    Reviewed - Ok to schedule      ----- Message -----  From: Nicky Bray MA  Sent: 5/3/2018  11:09 AM  To: Annette Trinidad MD

## 2018-05-07 ENCOUNTER — PREP FOR SURGERY (OUTPATIENT)
Dept: OTHER | Facility: HOSPITAL | Age: 58
End: 2018-05-07

## 2018-05-07 DIAGNOSIS — Z12.11 COLON CANCER SCREENING: Primary | ICD-10-CM

## 2018-05-07 NOTE — TELEPHONE ENCOUNTER
Emailed instructions      Dr. Annette Trinidad       Date: _____7/3/18_________ Arrival Time: ____8:00am_____    Hospital:  [facility]([address] [place] [address] [place] [address]) (back of hospital at the emergency room)       Please buy the following:  • One 64oz or two 32oz bottle(s) of Gatorade or any other non-carbonated drink (NO RED OR PURPLE). You may buy sugar-free if you are diabetic. You may refrigerate if preferred.   • Dulcolax tablets (not suppository or stool softener - will need 6 tablets).  • Geena lax 238 grams (8.3oz) powder or generic form polyethylene glycol 3350.  • One bottle of Infants’ Mylicon liquid ask pharmacist for substitute if not available.  SEVEN DAYS BEFORE STOP ASPIRIN, ADVIL, ALEVE, MOTRIN, IBU or anything listed on the back of this form.  The day prior to colonoscopy, you will need to follow a clear liquid diet.   Clear liquid diet requirements:  You may consume water, fruit juices, jello, clear broth or bouillon, popsicles, Sprite, sports drinks, etc. (no orange, grapefruit or V-8). Please consume plenty of clear liquids. AVOID: All solid foods, dairy products and anything red or purple. Limit coffee and tea.  The day prior to colonoscopy, begin prep as detailed below:  1) In AM, mix all Geena lax, all Gatorade and 3 milliliters of the Mylicon drops (.3 x 10=3ml) Stir/shake contents until completely dissolved. Chill if preferred. DO NOT DRINK YET.  2) At 9AM, take 3 tablets of Dulcolax pills with a large glass of water.  3) At 11AM, start drinking one 8oz glass of the Gatorade/Geena lax/Mylicon solution every 15 minutes until half of solution is gone.   4) At 5PM, drink other half of solution by drinking an 8oz glass every 15 minutes.  5) At 6PM, take last 3 tablets with a large glass of water.  6) NOTHING BY MOUTH AFTER MIDNIGHT. Or 8 hours prior.  You may take your morning heart, blood pressure, seizure, psych and breathing medications with a small sip of water. No gum or  candy.  7) You will need someone to drive you home after your exam. The average time spent is around 2-3 hours. You are not to drive, operate machinery or make legal decisions the remainder of the day.  Helpful tips:  • Some people may develop nausea/vomiting during this prep. The best option for this is to stop drinking the solution for about 30 minutes, then resume drinking at a slower rate. It is important to drink entire contents of solution.  • Walking in between drinking each glass reduces bloating.  • If diabetic, use sugar-free drinks and monitor blood sugar closely to prevent low blood sugar.      Please call the office the morning of your procedure if your bowel movements are not clearish. [phone] [place]. If it is after hours or the weekend and you need to reach the provider or the office please call [phone].  Please call the office as soon as possible if you need to reschedule or cancel your procedure you must give two weeks’ notice.  If you do not show up or frequently reschedule your procedure your provider has the option of not rescheduling.   It is your responsibility to check with your insurance company to determine benefits and out of pocket costs.     Avoid these medications 7 days prior to surgery  Please check with your prescribing doctor before stopping any medications    NSAIDS- Advil, Aleve, Motrin, Ibuprofen, Midol, Excedrin, Fiorinal, [place]  (Some cold medications may have these in them)    All herbal medications- iron, vitamin E and D, fish oil, decongestants (phenylephrine, pseudoephedrine), ginkgo, garlic, ginseng, Melo’s wart    Mobic (meloxicam), Celebrex, Diclofenac (Voltaren), Nambumetone (Relafen), Daypro, Naproxen, Sulindac, Indomethacin, Toradol, Feldine, Salsalate, Etodolac (Lodine),     Viagra, Cialis, Levitra    Aspirin, Plavix (clopidogrel), Effient, Pletal, Coumadin, Pradaxa, Brilinta, Ticlide, Eliquis, (Xaralto- 3 days)      Diet pills -Adipex  (phentermine) -2 weeks prior

## 2018-07-02 ENCOUNTER — ANESTHESIA EVENT (OUTPATIENT)
Dept: PERIOP | Facility: HOSPITAL | Age: 58
End: 2018-07-02

## 2018-07-03 ENCOUNTER — HOSPITAL ENCOUNTER (OUTPATIENT)
Facility: HOSPITAL | Age: 58
Setting detail: HOSPITAL OUTPATIENT SURGERY
Discharge: HOME | End: 2018-07-03
Attending: SURGERY | Admitting: ANESTHESIOLOGY

## 2018-07-03 ENCOUNTER — ANESTHESIA (OUTPATIENT)
Dept: PERIOP | Facility: HOSPITAL | Age: 58
End: 2018-07-03

## 2018-07-03 VITALS
OXYGEN SATURATION: 99 % | DIASTOLIC BLOOD PRESSURE: 69 MMHG | SYSTOLIC BLOOD PRESSURE: 123 MMHG | TEMPERATURE: 97.8 F | RESPIRATION RATE: 16 BRPM | HEART RATE: 64 BPM | BODY MASS INDEX: 31.57 KG/M2 | HEIGHT: 66 IN | WEIGHT: 196.4 LBS

## 2018-07-03 DIAGNOSIS — Z12.11 COLON CANCER SCREENING: ICD-10-CM

## 2018-07-03 LAB — GLUCOSE BLDC GLUCOMTR-MCNC: 169 MG/DL (ref 70–130)

## 2018-07-03 PROCEDURE — 45380 COLONOSCOPY AND BIOPSY: CPT | Performed by: SURGERY

## 2018-07-03 PROCEDURE — 82962 GLUCOSE BLOOD TEST: CPT

## 2018-07-03 PROCEDURE — 25010000002 PROPOFOL 10 MG/ML EMULSION: Performed by: NURSE ANESTHETIST, CERTIFIED REGISTERED

## 2018-07-03 PROCEDURE — S0260 H&P FOR SURGERY: HCPCS | Performed by: SURGERY

## 2018-07-03 RX ORDER — SODIUM CHLORIDE, SODIUM LACTATE, POTASSIUM CHLORIDE, CALCIUM CHLORIDE 600; 310; 30; 20 MG/100ML; MG/100ML; MG/100ML; MG/100ML
9 INJECTION, SOLUTION INTRAVENOUS CONTINUOUS
Status: DISCONTINUED | OUTPATIENT
Start: 2018-07-03 | End: 2018-07-03 | Stop reason: HOSPADM

## 2018-07-03 RX ORDER — ONDANSETRON 2 MG/ML
4 INJECTION INTRAMUSCULAR; INTRAVENOUS ONCE AS NEEDED
Status: DISCONTINUED | OUTPATIENT
Start: 2018-07-03 | End: 2018-07-03 | Stop reason: HOSPADM

## 2018-07-03 RX ORDER — MAGNESIUM HYDROXIDE 1200 MG/15ML
LIQUID ORAL AS NEEDED
Status: DISCONTINUED | OUTPATIENT
Start: 2018-07-03 | End: 2018-07-03 | Stop reason: HOSPADM

## 2018-07-03 RX ORDER — SODIUM CHLORIDE 0.9 % (FLUSH) 0.9 %
1-10 SYRINGE (ML) INJECTION AS NEEDED
Status: DISCONTINUED | OUTPATIENT
Start: 2018-07-03 | End: 2018-07-03 | Stop reason: HOSPADM

## 2018-07-03 RX ORDER — LIDOCAINE HYDROCHLORIDE 20 MG/ML
INJECTION, SOLUTION INFILTRATION; PERINEURAL AS NEEDED
Status: DISCONTINUED | OUTPATIENT
Start: 2018-07-03 | End: 2018-07-03 | Stop reason: SURG

## 2018-07-03 RX ORDER — SODIUM CHLORIDE 9 MG/ML
40 INJECTION, SOLUTION INTRAVENOUS AS NEEDED
Status: DISCONTINUED | OUTPATIENT
Start: 2018-07-03 | End: 2018-07-03 | Stop reason: HOSPADM

## 2018-07-03 RX ORDER — LIDOCAINE HYDROCHLORIDE 10 MG/ML
0.5 INJECTION, SOLUTION EPIDURAL; INFILTRATION; INTRACAUDAL; PERINEURAL ONCE AS NEEDED
Status: COMPLETED | OUTPATIENT
Start: 2018-07-03 | End: 2018-07-03

## 2018-07-03 RX ORDER — SODIUM CHLORIDE, SODIUM LACTATE, POTASSIUM CHLORIDE, CALCIUM CHLORIDE 600; 310; 30; 20 MG/100ML; MG/100ML; MG/100ML; MG/100ML
100 INJECTION, SOLUTION INTRAVENOUS CONTINUOUS
Status: DISCONTINUED | OUTPATIENT
Start: 2018-07-03 | End: 2018-07-03 | Stop reason: HOSPADM

## 2018-07-03 RX ORDER — PROPOFOL 10 MG/ML
VIAL (ML) INTRAVENOUS AS NEEDED
Status: DISCONTINUED | OUTPATIENT
Start: 2018-07-03 | End: 2018-07-03 | Stop reason: SURG

## 2018-07-03 RX ORDER — ASPIRIN 81 MG/1
81 TABLET, CHEWABLE ORAL DAILY
COMMUNITY
End: 2022-10-15 | Stop reason: HOSPADM

## 2018-07-03 RX ADMIN — SODIUM CHLORIDE, POTASSIUM CHLORIDE, SODIUM LACTATE AND CALCIUM CHLORIDE 9 ML/HR: 600; 310; 30; 20 INJECTION, SOLUTION INTRAVENOUS at 08:30

## 2018-07-03 RX ADMIN — LIDOCAINE HYDROCHLORIDE 100 MG: 20 INJECTION, SOLUTION INFILTRATION; PERINEURAL at 09:00

## 2018-07-03 RX ADMIN — PROPOFOL 40 MG: 10 INJECTION, EMULSION INTRAVENOUS at 09:04

## 2018-07-03 RX ADMIN — PROPOFOL 40 MG: 10 INJECTION, EMULSION INTRAVENOUS at 09:08

## 2018-07-03 RX ADMIN — PROPOFOL 40 MG: 10 INJECTION, EMULSION INTRAVENOUS at 09:13

## 2018-07-03 RX ADMIN — PROPOFOL 40 MG: 10 INJECTION, EMULSION INTRAVENOUS at 09:10

## 2018-07-03 RX ADMIN — LIDOCAINE HYDROCHLORIDE 0.5 ML: 10 INJECTION, SOLUTION EPIDURAL; INFILTRATION; INTRACAUDAL; PERINEURAL at 08:30

## 2018-07-03 RX ADMIN — PROPOFOL 40 MG: 10 INJECTION, EMULSION INTRAVENOUS at 09:15

## 2018-07-03 RX ADMIN — SODIUM CHLORIDE, POTASSIUM CHLORIDE, SODIUM LACTATE AND CALCIUM CHLORIDE: 600; 310; 30; 20 INJECTION, SOLUTION INTRAVENOUS at 08:25

## 2018-07-03 RX ADMIN — PROPOFOL 40 MG: 10 INJECTION, EMULSION INTRAVENOUS at 09:06

## 2018-07-03 RX ADMIN — PROPOFOL 40 MG: 10 INJECTION, EMULSION INTRAVENOUS at 09:18

## 2018-07-03 RX ADMIN — PROPOFOL 50 MG: 10 INJECTION, EMULSION INTRAVENOUS at 09:02

## 2018-07-03 RX ADMIN — PROPOFOL 40 MG: 10 INJECTION, EMULSION INTRAVENOUS at 09:21

## 2018-07-03 RX ADMIN — PROPOFOL 50 MG: 10 INJECTION, EMULSION INTRAVENOUS at 09:00

## 2018-07-03 NOTE — OP NOTE
Colonoscopy Procedure Note  Date of procedure: 7/3/18    Pre-operative Diagnosis:  Colon cancer screening    Post-operative Diagnosis: Cecal polyp            Internal hemorrhoids    Procedure: Colonoscopy with polypectomy    Surgeon: Annette Trinidad M.D.    Anesthetic: MAC per Rochelle Sawant CRNA    EBL : Minimal    Complications : None    Indications:  Patient is a 57 erythema referred to general surgery for colon cancer screening.  She's never had a colonoscopy before.  Other than occasional constipation offers no other GI complaints.  Denies unintentional weight loss.  Denies family history of colon cancer or colon polyps.  We discussed proceeding with colonoscopy.  Procedure, risks, benefits, complications including but not limited to risk of bleeding, post-polypectomy syndrome, perforation requiring emergent additional procedures as well as complications associated with anesthetic, patient understood and gave informed consent.    Findings/Treatments: Colonoscopy preparation was fair quality.  Sessile 2 mm polyp noted in cecum at appendicecal orifice, resected, retrieved using cold biopsy forceps, nonbleeding internal hemorrhoids.      Scope Withdrawal Time:  Greater than 6 minutes      Recommendations:   -Await pathology., -If adenoma is present, repeat colonoscopy in 3 years or sooner if she is having problems., -High fiber diet., -We will contact patient with results/recommendations.    Procedure Details     After discussing the benefits and risks of the procedure with the patient, not limited to but including:  Bleeding, infection, perforation, aspiration; informed consent was signed.  The patient was taken into the endoscopy room at [facility] and placed in the left lateral decubitus position.  MAC anesthesia was given with appropriate cardiopulmonary monitoring.  A rectal exam was performed.  Sphincter tone was normal, no rectal masses, no external hemorrhoids.  The colonoscope was then inserted and  carefully advanced to the cecum while visualizing the mucosa.  The cecum was identified by the anatomic landmarks i.e. the cecal strap, ileocecal valve and the orifice of the appendix.  Photo documentation was obtained.  In the cecum at appendicecal orifice there was a sessile 2 mm polyp resected retrieved using cold biopsy forceps, EBL minimal and hemostasis assured.  Scope was then gradually withdrawn carefully evaluating the remaining colon in a circumferential fashion with findings as follows: Ileocecal valve, remaining cecum, ascending colon, hepatic flexure, transverse colon, splenic flexure, descending colon, sigmoid colon no gross mucosal lesions, no polyps or masses noted.  Scope was brought back into the rectum, in distal rectum retroflex examination performed with evidence of nonbleeding moderate-sized internal hemorrhoids.  Scope was then straightened out, deretroflexed, withdrawn from the patient while desufflating the colon.  Patient was awakened, her anesthesia was reversed, she was taken to recovery room in stable condition having tolerated her procedure well with no immediate apparent complications.    Annette Trinidad MD

## 2018-07-03 NOTE — H&P
General Surgery      Patient Care Team:  Maria L Perez MD as PCP - General (Internal Medicine)    CHIEF COMPLAINT: Colon cancer screening    HISTORY OF PRESENT ILLNESS: Patient is a 57-year-old female referred to general surgery for colon cancer screening.  She has never had a colonoscopy before.  Denies any abdominal pain, pelvic pain, diarrhea, melena, hematochezia, proctalgia, problems with hemorrhoids.  She does complain of occasional constipation relieved with diet modification.  Denies any unintentional weight loss.  She denies family history of colon cancer, colon polyps.  There is a family history of Crohn's disease in her nephew.  Her past medical history significant for diabetes and depression.  Past surgical history is significant for orthopedic procedures and a D&C.      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   6/24/2018   • atorvastatin (LIPITOR) 40 MG tablet Take 1 tablet by mouth Every Night. 90 tablet 3 6/29/2018   • Blood Glucose Monitoring Suppl (ONE TOUCH ULTRA SYSTEM KIT) W/DEVICE kit Check blood sugar 3-4 times per day for first 3 days and then once per day  "thereafter 1 each 0 Taking   • cetirizine (zyrTEC) 10 MG tablet Take 10 mg by mouth Daily.   6/29/2018   • Ergocalciferol 400 UNITS tablet Take 2 tablets by mouth Daily. 30 tablet 6 6/29/2018   • glucose blood test strip Use as instructed 100 each 12 Taking   • Linagliptin-Metformin HCl ER (JENTADUETO XR) 2.5-1000 MG tablet sustained-release 24 hour Take 2 tablets by mouth Every Morning. 180 tablet 3 6/29/2018   • ONE TOUCH LANCETS misc Use to check blood sugar 3 times per day for 3-4 days and then once per day 200 each 11 Taking   • sertraline (ZOLOFT) 50 MG tablet Take 2 tablets by mouth Daily. 90 tablet 3 7/1/2018     Allergies:  Patient has no known allergies.    REVIEW OF SYSTEMS:  Please see the above history of present illness for pertinent positives and negatives.  The remainder of the patient's systems have been reviewed and are negative.     Vital Signs  Temp:  [99 °F (37.2 °C)] 99 °F (37.2 °C)  Heart Rate:  [60] 60  Resp:  [16] 16  BP: (128)/(67) 128/67    Flowsheet Rows      First Filed Value   Admission Height  167.6 cm (66\") Documented at 07/03/2018 0806   Admission Weight  89.1 kg (196 lb 6.4 oz) Documented at 07/03/2018 0806           Physical Exam:  Physical Exam   Constitutional: Patient appears well-developed and well-nourished and in no acute distress   HEENT:   Head: Normocephalic and atraumatic.   Eyes:  Pupils are equal, round, and reactive to light.  Mouth and Throat: Patient has moist mucous membranes. Oropharynx is clear of any erythema or exudate.     Neck: Neck supple. No JVD present. No thyromegaly present. No lymphadenopathy present.  Cardiovascular: Regular rate, regular rhythm.  Pulmonary/Chest: Lungs are clear to auscultation bilaterally.  Abdominal: Soft, moderately obese, nondistended nontender positive bowel sounds in all 4 quadrants   Musculoskeletal: Normal posture.  Extremities: No edema. Pulses are palpable in all 4 extremities.  Neurological: Patient is alert and " oriented.  Psychological:   Mood and behavior appropriate.  Skin: Skin is warm and dry.     Results Review:    I reviewed the patient's new clinical results.          Lab Results (most recent)     Procedure Component Value Units Date/Time    POC Glucose Once [[id number]]  (Abnormal) Collected:  07/03/18 0828    Specimen:  Blood Updated:  07/03/18 0843     Glucose 169 (H) mg/dL     Narrative:       Meter: [id number] : [id number] Yeyo Cody RN          Imaging Results (most recent)     None          ECG/EMG Results (most recent)     None            Assessment/Plan     Colon cancer screening and average risk patient  Based on [employer] guidelines I have discussed and recommended to the patient that we should proceed with screening colonoscopy.  I have discussed with her the possibility of biopsies, polypectomy.  Procedure, risks, benefits, complications including but not limited to risk of bleeding, post-polypectomy syndrome, perforation requiring emergent additional procedures as well as complications associated with anesthetic were thoroughly discussed with the patient who understood and gave informed consent.    I discussed the patients findings and my recommendations with patient and her .     Annette Trinidad MD  07/03/18  8:57 AM

## 2018-07-03 NOTE — ANESTHESIA PREPROCEDURE EVALUATION
Anesthesia Evaluation     Patient summary reviewed and Nursing notes reviewed   no history of anesthetic complications:  NPO Solid Status: > 8 hours  NPO Liquid Status: > 8 hours           Airway   Mallampati: II  TM distance: >3 FB  Neck ROM: full  No difficulty expected  Dental      Pulmonary - negative pulmonary ROS    breath sounds clear to auscultation  Cardiovascular   Exercise tolerance: good (4-7 METS)    ECG reviewed  Rhythm: regular  Rate: normal    (+) hyperlipidemia,     ROS comment: Narrative     Von Richards MD     11/11/2016 10:58 AM    ECG 12 Lead  Date/Time: 11/11/2016 10:55 AM  Performed by: VON RICHARDS  Authorized by: VON RICHARDS   Comparison: compared with previous ECG   Similar to previous ECG  Rhythm: sinus rhythm  Rate: normal  Conduction: conduction normal  ST Segments: ST segments normal  T Waves: T waves normal  QRS axis: normal  Other: no other findings  Clinical impression: normal ECG        Neuro/Psych  (+) psychiatric history Depression,     GI/Hepatic/Renal/Endo    (+) obesity,   diabetes mellitus type 2 poorly controlled, hypothyroidism (monitoring for but no treatment at this time),     Musculoskeletal     Abdominal   (+) obese,    Substance History - negative use     OB/GYN          Other   (+) arthritis (right knee)                     Anesthesia Plan    ASA 3     MAC     intravenous induction   Anesthetic plan and risks discussed with patient.  Use of blood products discussed with patient  Consented to blood products.

## 2018-07-11 LAB
LAB AP CASE REPORT: NORMAL
PATH REPORT.FINAL DX SPEC: NORMAL

## 2018-07-27 ENCOUNTER — OFFICE VISIT (OUTPATIENT)
Dept: SURGERY | Facility: CLINIC | Age: 58
End: 2018-07-27

## 2018-07-27 VITALS
DIASTOLIC BLOOD PRESSURE: 74 MMHG | BODY MASS INDEX: 31.98 KG/M2 | WEIGHT: 199 LBS | HEIGHT: 66 IN | SYSTOLIC BLOOD PRESSURE: 122 MMHG

## 2018-07-27 DIAGNOSIS — K52.9 IBD (INFLAMMATORY BOWEL DISEASE): Primary | ICD-10-CM

## 2018-07-27 PROCEDURE — 99213 OFFICE O/P EST LOW 20 MIN: CPT | Performed by: SURGERY

## 2018-08-14 ENCOUNTER — OFFICE VISIT (OUTPATIENT)
Dept: INTERNAL MEDICINE | Facility: CLINIC | Age: 58
End: 2018-08-14

## 2018-08-14 VITALS
OXYGEN SATURATION: 98 % | TEMPERATURE: 99.7 F | SYSTOLIC BLOOD PRESSURE: 134 MMHG | HEART RATE: 95 BPM | HEIGHT: 66 IN | WEIGHT: 197.3 LBS | RESPIRATION RATE: 18 BRPM | BODY MASS INDEX: 31.71 KG/M2 | DIASTOLIC BLOOD PRESSURE: 82 MMHG

## 2018-08-14 DIAGNOSIS — J40 BRONCHITIS: Primary | ICD-10-CM

## 2018-08-14 DIAGNOSIS — J45.909 REACTIVE AIRWAY DISEASE WITHOUT COMPLICATION, UNSPECIFIED ASTHMA SEVERITY, UNSPECIFIED WHETHER PERSISTENT: ICD-10-CM

## 2018-08-14 PROCEDURE — 99214 OFFICE O/P EST MOD 30 MIN: CPT | Performed by: INTERNAL MEDICINE

## 2018-08-14 PROCEDURE — 94640 AIRWAY INHALATION TREATMENT: CPT | Performed by: INTERNAL MEDICINE

## 2018-08-14 RX ORDER — BENZONATATE 100 MG/1
100 CAPSULE ORAL 3 TIMES DAILY PRN
Qty: 30 CAPSULE | Refills: 1 | Status: SHIPPED | OUTPATIENT
Start: 2018-08-14 | End: 2019-07-30

## 2018-08-14 RX ORDER — ALBUTEROL SULFATE 2.5 MG/3ML
2.5 SOLUTION RESPIRATORY (INHALATION) ONCE
Status: COMPLETED | OUTPATIENT
Start: 2018-08-14 | End: 2018-08-14

## 2018-08-14 RX ORDER — AZITHROMYCIN 250 MG/1
TABLET, FILM COATED ORAL
Qty: 6 TABLET | Refills: 0 | Status: SHIPPED | OUTPATIENT
Start: 2018-08-14 | End: 2019-07-30

## 2018-08-14 RX ORDER — GUAIFENESIN AND CODEINE PHOSPHATE 100; 10 MG/5ML; MG/5ML
5 SOLUTION ORAL 3 TIMES DAILY PRN
Qty: 118 ML | Refills: 0 | Status: SHIPPED | OUTPATIENT
Start: 2018-08-14 | End: 2019-07-30

## 2018-08-14 RX ORDER — ALBUTEROL SULFATE 90 UG/1
2 AEROSOL, METERED RESPIRATORY (INHALATION) EVERY 4 HOURS PRN
Qty: 1 INHALER | Refills: 0 | Status: SHIPPED | OUTPATIENT
Start: 2018-08-14 | End: 2018-09-10 | Stop reason: SDUPTHER

## 2018-08-14 RX ADMIN — ALBUTEROL SULFATE 2.5 MG: 2.5 SOLUTION RESPIRATORY (INHALATION) at 10:51

## 2018-08-14 NOTE — PATIENT INSTRUCTIONS
Acute Bronchitis, Adult  Acute bronchitis is when air tubes (bronchi) in the lungs suddenly get swollen. The condition can make it hard to breathe. It can also cause these symptoms:  · A cough.  · Coughing up clear, yellow, or green mucus.  · Wheezing.  · Chest congestion.  · Shortness of breath.  · A fever.  · Body aches.  · Chills.  · A sore throat.    Follow these instructions at home:  Medicines  · Take over-the-counter and prescription medicines only as told by your doctor.  · If you were prescribed an antibiotic medicine, take it as told by your doctor. Do not stop taking the antibiotic even if you start to feel better.  General instructions  · Rest.  · Drink enough fluids to keep your pee (urine) clear or pale yellow.  · Avoid smoking and secondhand smoke. If you smoke and you need help quitting, ask your doctor. Quitting will help your lungs heal faster.  · Use an inhaler, cool mist vaporizer, or humidifier as told by your doctor.  · Keep all follow-up visits as told by your doctor. This is important.  How is this prevented?  To lower your risk of getting this condition again:  · Wash your hands often with soap and water. If you cannot use soap and water, use hand .  · Avoid contact with people who have cold symptoms.  · Try not to touch your hands to your mouth, nose, or eyes.  · Make sure to get the flu shot every year.    Contact a doctor if:  · Your symptoms do not get better in 2 weeks.  Get help right away if:  · You cough up blood.  · You have chest pain.  · You have very bad shortness of breath.  · You become dehydrated.  · You faint (pass out) or keep feeling like you are going to pass out.  · You keep throwing up (vomiting).  · You have a very bad headache.  · Your fever or chills gets worse.  This information is not intended to replace advice given to you by your health care provider. Make sure you discuss any questions you have with your health care provider.  Document Released:  06/05/2009 Document Revised: 07/26/2017 Document Reviewed: 06/07/2017  Elsevier Interactive Patient Education © 2018 [employer]

## 2018-08-14 NOTE — PROGRESS NOTES
Dionne Bond is a 57 y.o. female, who presents with a chief complaint of   Chief Complaint   Patient presents with   • Sore Throat   • Cough       56 yo F here for acute visit and has been sick for one week. Her daughter was sick with similar symptoms.  Started as a sore throat and now is coughing. Her ribs hurt from coughing. She is rattling in her chest. She feels that she is wheezing. No fevers or chills. She has tried Mucinex and Delsym that has not helped.          The following portions of the patient's history were reviewed and updated as appropriate: allergies, current medications, past family history, past medical history, past social history, past surgical history and problem list.    Allergies: Patient has no known allergies.    Current Outpatient Prescriptions:   •  aspirin 81 MG chewable tablet, Chew 81 mg Daily., Disp: , Rfl:   •  atorvastatin (LIPITOR) 40 MG tablet, Take 1 tablet by mouth Every Night., Disp: 90 tablet, Rfl: 3  •  Blood Glucose Monitoring Suppl (ONE TOUCH ULTRA SYSTEM KIT) W/DEVICE kit, Check blood sugar 3-4 times per day for first 3 days and then once per day thereafter, Disp: 1 each, Rfl: 0  •  cetirizine (zyrTEC) 10 MG tablet, Take 10 mg by mouth Daily., Disp: , Rfl:   •  Dextromethorphan Polistirex (DELSYM PO), Take  by mouth., Disp: , Rfl:   •  Ergocalciferol 400 UNITS tablet, Take 2 tablets by mouth Daily., Disp: 30 tablet, Rfl: 6  •  Fexofenadine HCl (MUCINEX ALLERGY PO), Take  by mouth., Disp: , Rfl:   •  Fluticasone Furoate-Vilanterol 100-25 MCG/INH aerosol powder , Inhale 1 puff Daily., Disp: , Rfl:   •  glucose blood test strip, Use as instructed, Disp: 100 each, Rfl: 12  •  Linagliptin-Metformin HCl ER (JENTADUETO XR) 2.5-1000 MG tablet sustained-release 24 hour, Take 2 tablets by mouth Every Morning., Disp: 180 tablet, Rfl: 3  •  ONE TOUCH LANCETS misc, Use to check blood sugar 3 times per day for 3-4 days and then once per day, Disp: 200 each, Rfl: 11  •  sertraline  "(ZOLOFT) 50 MG tablet, Take 2 tablets by mouth Daily., Disp: 90 tablet, Rfl: 3  •  albuterol (VENTOLIN HFA) 108 (90 Base) MCG/ACT inhaler, Inhale 2 puffs Every 4 (Four) Hours As Needed for Wheezing or Shortness of Air., Disp: 1 inhaler, Rfl: 0  •  azithromycin (ZITHROMAX Z-TOÑO) 250 MG tablet, Take 2 tablets the first day, then 1 tablet daily for 4 days., Disp: 6 tablet, Rfl: 0  •  benzonatate (TESSALON) 100 MG capsule, Take 1 capsule by mouth 3 (Three) Times a Day As Needed for Cough., Disp: 30 capsule, Rfl: 1  •  guaifenesin-codeine (GUAIFENESIN AC) 100-10 MG/5ML liquid, Take 5 mL by mouth 3 (Three) Times a Day As Needed for Cough., Disp: 118 mL, Rfl: 0  No current facility-administered medications for this visit.   There are no discontinued medications.    Review of Systems   Constitutional: Positive for activity change, chills and fatigue. Negative for fever.   HENT: Positive for congestion and sore throat.    Respiratory: Positive for cough. Negative for choking, shortness of breath and wheezing.    Gastrointestinal: Negative for diarrhea and nausea.   Skin: Negative for rash.             /82 (BP Location: Left arm, Patient Position: Sitting, Cuff Size: Large Adult)   Pulse 95   Temp 99.7 °F (37.6 °C) (Oral)   Resp 18   Ht 167.6 cm (65.98\")   Wt 89.5 kg (197 lb 4.8 oz)   LMP  (LMP Unknown)   SpO2 98%   BMI 31.86 kg/m²       Physical Exam   Constitutional: She is oriented to person, place, and time. She appears well-developed and well-nourished. No distress.   HENT:   Head: Normocephalic and atraumatic.   Right Ear: Hearing, tympanic membrane, external ear and ear canal normal.   Left Ear: Hearing, tympanic membrane, external ear and ear canal normal.   Nose: Nose normal.   Mouth/Throat: Mucous membranes are normal. Posterior oropharyngeal edema and posterior oropharyngeal erythema present. No oropharyngeal exudate. Tonsils are 0 on the right. Tonsils are 0 on the left.   Eyes: Conjunctivae are " normal. Right eye exhibits no discharge. Left eye exhibits no discharge. No scleral icterus.   Neck: Neck supple.   Cardiovascular: Normal rate, regular rhythm and normal heart sounds.  Exam reveals no gallop and no friction rub.    No murmur heard.  Pulmonary/Chest: Effort normal. No respiratory distress. She has wheezes (posterior bases on end epiration that improved with albuterol ). She has rhonchi. She has no rales.   Lymphadenopathy:     She has no cervical adenopathy.   Neurological: She is alert and oriented to person, place, and time.   Skin: Skin is warm. Capillary refill takes less than 2 seconds. No rash noted.   Psychiatric: She has a normal mood and affect. Her behavior is normal.   Nursing note and vitals reviewed.        Results for orders placed or performed during the hospital encounter of 07/03/18   POC Glucose Once   Result Value Ref Range    Glucose 169 (H) 70 - 130 mg/dL   Tissue Pathology Exam   Result Value Ref Range    Case Report       Surgical Pathology Report                         Case: [id number]                                  Authorizing Provider:  Annette Trinidad MD       Collected:           07/03/2018 09:14 AM          Ordering Location:     [facility]   Received:            07/03/2018 11:42 AM                                 OR                                                                           Pathologist:           Jose A Payne MD                                                          Specimen:    Large Intestine, Cecum, Orifice of appendix polyp x 1                                      Final Diagnosis       1. Large Intestine, Cecum, COLONOSCOPY with polypectomy:    Testing performed at outside laboratory. See scanned report.                 Dionne was seen today for sore throat and cough.    Diagnoses and all orders for this visit:    Bronchitis  -     albuterol (PROVENTIL) nebulizer solution 0.083% 2.5 mg/3mL; Take 2.5 mg by nebulization 1 (One)  Time.    Reactive airway disease without complication, unspecified asthma severity, unspecified whether persistent  -     albuterol (PROVENTIL) nebulizer solution 0.083% 2.5 mg/3mL; Take 2.5 mg by nebulization 1 (One) Time.    Other orders  -     azithromycin (ZITHROMAX Z-TOÑO) 250 MG tablet; Take 2 tablets the first day, then 1 tablet daily for 4 days.  -     guaifenesin-codeine (GUAIFENESIN AC) 100-10 MG/5ML liquid; Take 5 mL by mouth 3 (Three) Times a Day As Needed for Cough.  -     albuterol (VENTOLIN HFA) 108 (90 Base) MCG/ACT inhaler; Inhale 2 puffs Every 4 (Four) Hours As Needed for Wheezing or Shortness of Air.  -     benzonatate (TESSALON) 100 MG capsule; Take 1 capsule by mouth 3 (Three) Times a Day As Needed for Cough.    Z-pack for inflammatory properties as well as possible bacterial infection given that she has been sick for 7 days   Breo 1 puff daily and rinse mouth after use  Cough syrup as needed as well as Tessalon pearls   Return if not better   No steroids due to diabetes and will use ICS instead       Return in about 7 weeks (around 10/1/2018), or if symptoms worsen or fail to improve, for Recheck.    Maria L Perez MD  08/14/2018

## 2018-09-04 ENCOUNTER — OFFICE VISIT (OUTPATIENT)
Dept: GASTROENTEROLOGY | Facility: CLINIC | Age: 58
End: 2018-09-04

## 2018-09-04 VITALS
HEIGHT: 66 IN | BODY MASS INDEX: 32.27 KG/M2 | SYSTOLIC BLOOD PRESSURE: 128 MMHG | WEIGHT: 200.8 LBS | DIASTOLIC BLOOD PRESSURE: 78 MMHG

## 2018-09-04 DIAGNOSIS — K52.9 COLITIS: Primary | ICD-10-CM

## 2018-09-04 PROCEDURE — 99203 OFFICE O/P NEW LOW 30 MIN: CPT | Performed by: INTERNAL MEDICINE

## 2018-09-04 NOTE — PROGRESS NOTES
PATIENT INFORMATION  Dionne Bond       - 1960    CHIEF COMPLAINT  Chief Complaint   Patient presents with   • Colonoscopy     discuss dr waller results   • Constipation       HISTORY OF PRESENT ILLNESS  Constipation     56 yo sent to see me by Dr. Waller. She had cls done in July with focal area in cecum described as a polyp. Pathology returned as focal area of cryptitis/crypt abscess formation. No granuloma noted. She had this for screening purposes. She denies any changes in bowel habits or abdominal pain. She has a bm every 3 days which is her normal. She has a nephew with crohns. She denies any joint pain, swelling, or apthous ulcers.   The remainder of the colon looks normal after reviewing images. TI does not look like it was entered.        REVIEW OF SYSTEMS  Review of Systems   Gastrointestinal: Positive for constipation.   All other systems reviewed and are negative.        ACTIVE PROBLEMS  Patient Active Problem List    Diagnosis   • Colon cancer screening [Z12.11]     Overview Note:     Added automatically from request for surgery [id number]     • Abscess of vulva [N76.4]   • Depression [F32.9]   • Vitamin D deficiency [E55.9]   • Obesity (BMI 30.0-34.9) [E66.9]   • Mixed hyperlipidemia [E78.2]   • Type 2 diabetes mellitus without complication (CMS/HCC) [E11.9]         PAST MEDICAL HISTORY  Past Medical History:   Diagnosis Date   • Depression    • Diabetes mellitus (CMS/HCC)    • Diastasis recti    • Hyperlipidemia    • Obesity          SURGICAL HISTORY  Past Surgical History:   Procedure Laterality Date   • COLONOSCOPY N/A 7/3/2018    Procedure: COLONOSCOPY with polypectomy;  Surgeon: Annette Waller MD;  Location: [facility];  Service: Gastroenterology   • DILATATION AND CURETTAGE     • FOOT SURGERY Left     Plantar filuroma. 2013 and 2013   • KNEE ARTHROSCOPY Right    • WISDOM TOOTH EXTRACTION           FAMILY HISTORY  Family History   Problem Relation Age of Onset   •  Thyroid disease Mother    • Heart attack Maternal Grandmother    • Diabetes type II Maternal Grandfather    • Stroke Maternal Grandfather    • Heart attack Paternal Grandfather    • Colon cancer Neg Hx    • Colon polyps Neg Hx          SOCIAL HISTORY  Social History     Occupational History   • Not on file.     Social History Main Topics   • Smoking status: Never Smoker   • Smokeless tobacco: Never Used   • Alcohol use Yes      Comment: occasionally   • Drug use: No   • Sexual activity: Yes     Partners: Male         CURRENT MEDICATIONS    Current Outpatient Prescriptions:   •  albuterol (VENTOLIN HFA) 108 (90 Base) MCG/ACT inhaler, Inhale 2 puffs Every 4 (Four) Hours As Needed for Wheezing or Shortness of Air., Disp: 1 inhaler, Rfl: 0  •  aspirin 81 MG chewable tablet, Chew 81 mg Daily., Disp: , Rfl:   •  atorvastatin (LIPITOR) 40 MG tablet, Take 1 tablet by mouth Every Night., Disp: 90 tablet, Rfl: 3  •  azithromycin (ZITHROMAX Z-TOÑO) 250 MG tablet, Take 2 tablets the first day, then 1 tablet daily for 4 days., Disp: 6 tablet, Rfl: 0  •  benzonatate (TESSALON) 100 MG capsule, Take 1 capsule by mouth 3 (Three) Times a Day As Needed for Cough., Disp: 30 capsule, Rfl: 1  •  Blood Glucose Monitoring Suppl (ONE TOUCH ULTRA SYSTEM KIT) W/DEVICE kit, Check blood sugar 3-4 times per day for first 3 days and then once per day thereafter, Disp: 1 each, Rfl: 0  •  cetirizine (zyrTEC) 10 MG tablet, Take 10 mg by mouth Daily., Disp: , Rfl:   •  Dextromethorphan Polistirex (DELSYM PO), Take  by mouth., Disp: , Rfl:   •  Ergocalciferol 400 UNITS tablet, Take 2 tablets by mouth Daily., Disp: 30 tablet, Rfl: 6  •  Fexofenadine HCl (MUCINEX ALLERGY PO), Take  by mouth., Disp: , Rfl:   •  Fluticasone Furoate-Vilanterol 100-25 MCG/INH aerosol powder , Inhale 1 puff Daily., Disp: , Rfl:   •  glucose blood test strip, Use as instructed, Disp: 100 each, Rfl: 12  •  guaifenesin-codeine (GUAIFENESIN AC) 100-10 MG/5ML liquid, Take 5 mL by  "mouth 3 (Three) Times a Day As Needed for Cough., Disp: 118 mL, Rfl: 0  •  Linagliptin-Metformin HCl ER (JENTADUETO XR) 2.5-1000 MG tablet sustained-release 24 hour, Take 2 tablets by mouth Every Morning., Disp: 180 tablet, Rfl: 3  •  ONE TOUCH LANCETS misc, Use to check blood sugar 3 times per day for 3-4 days and then once per day, Disp: 200 each, Rfl: 11  •  sertraline (ZOLOFT) 50 MG tablet, TAKE 2 TABLETS BY MOUTH DAILY., Disp: 90 tablet, Rfl: 3    ALLERGIES  Patient has no known allergies.    VITALS  Vitals:    09/04/18 1438   BP: 128/78   Weight: 91.1 kg (200 lb 12.8 oz)   Height: 167.6 cm (65.98\")       LAST RESULTS   Admission on 07/03/2018, Discharged on 07/03/2018   Component Date Value Ref Range Status   • Glucose 07/03/2018 169* 70 - 130 mg/dL Final   • Case Report 07/03/2018    Final                    Value:Surgical Pathology Report                         Case: [id number]                                  Authorizing Provider:  Annette Trinidad MD       Collected:           07/03/2018 09:14 AM          Ordering Location:     [facility]   Received:            07/03/2018 11:42 AM                                 OR                                                                           Pathologist:           Jose A Payne MD                                                          Specimen:    Large Intestine, Cecum, Orifice of appendix polyp x 1                                     • Final Diagnosis 07/03/2018    Final                    Value:This result contains rich text formatting which cannot be displayed here.     No results found.    PHYSICAL EXAM  Physical Exam   Constitutional: She is oriented to person, place, and time. She appears well-developed and well-nourished. No distress.   HENT:   Head: Normocephalic and atraumatic.   Mouth/Throat: Oropharynx is clear and moist.   Eyes: Pupils are equal, round, and reactive to light. EOM are normal.   Neck: Normal range of motion. No " tracheal deviation present.   Cardiovascular: Normal rate, regular rhythm, normal heart sounds and intact distal pulses.  Exam reveals no gallop and no friction rub.    No murmur heard.  Pulmonary/Chest: Effort normal and breath sounds normal. No stridor. No respiratory distress. She has no wheezes. She has no rales. She exhibits no tenderness.   Abdominal: Soft. Bowel sounds are normal. She exhibits no distension. There is no tenderness. There is no rebound and no guarding.   Musculoskeletal: She exhibits no edema.   Lymphadenopathy:     She has no cervical adenopathy.   Neurological: She is alert and oriented to person, place, and time.   Skin: Skin is warm. She is not diaphoretic.   Psychiatric: She has a normal mood and affect. Her behavior is normal. Judgment and thought content normal.   Nursing note and vitals reviewed.      ASSESSMENT  Diagnoses and all orders for this visit:    Colitis          PLAN  No Follow-up on file.  Very focal area of finite cryptitis/crypt abscess.  No clinical symptoms  Review pathology with pathologist  ibd 7 sereology  If this is normal, I do not think we need to do anything else.  She has cls in 3 years scheduled.    rtc in 3 weeks.

## 2018-09-10 RX ORDER — ALBUTEROL SULFATE 90 UG/1
2 AEROSOL, METERED RESPIRATORY (INHALATION) EVERY 4 HOURS PRN
Qty: 18 INHALER | Refills: 0 | Status: SHIPPED | OUTPATIENT
Start: 2018-09-10 | End: 2020-03-02

## 2018-09-19 RX ORDER — ATORVASTATIN CALCIUM 40 MG/1
40 TABLET, FILM COATED ORAL NIGHTLY
Qty: 90 TABLET | Refills: 1 | Status: SHIPPED | OUTPATIENT
Start: 2018-09-19 | End: 2019-04-06 | Stop reason: SDUPTHER

## 2019-04-08 RX ORDER — ATORVASTATIN CALCIUM 40 MG/1
40 TABLET, FILM COATED ORAL NIGHTLY
Qty: 90 TABLET | Refills: 1 | Status: SHIPPED | OUTPATIENT
Start: 2019-04-08 | End: 2019-08-13 | Stop reason: SDUPTHER

## 2019-07-30 ENCOUNTER — OFFICE VISIT (OUTPATIENT)
Dept: INTERNAL MEDICINE | Facility: CLINIC | Age: 59
End: 2019-07-30

## 2019-07-30 VITALS
RESPIRATION RATE: 18 BRPM | BODY MASS INDEX: 33.27 KG/M2 | TEMPERATURE: 98.7 F | DIASTOLIC BLOOD PRESSURE: 80 MMHG | HEART RATE: 66 BPM | HEIGHT: 66 IN | SYSTOLIC BLOOD PRESSURE: 130 MMHG | OXYGEN SATURATION: 99 % | WEIGHT: 207 LBS

## 2019-07-30 DIAGNOSIS — R11.2 NAUSEA AND VOMITING, INTRACTABILITY OF VOMITING NOT SPECIFIED, UNSPECIFIED VOMITING TYPE: ICD-10-CM

## 2019-07-30 DIAGNOSIS — E55.9 VITAMIN D DEFICIENCY: ICD-10-CM

## 2019-07-30 DIAGNOSIS — E78.2 MIXED HYPERLIPIDEMIA: ICD-10-CM

## 2019-07-30 DIAGNOSIS — R73.9 HYPERGLYCEMIA: ICD-10-CM

## 2019-07-30 DIAGNOSIS — E11.65 UNCONTROLLED TYPE 2 DIABETES MELLITUS WITH HYPERGLYCEMIA (HCC): Primary | ICD-10-CM

## 2019-07-30 DIAGNOSIS — R11.0 NAUSEA: ICD-10-CM

## 2019-07-30 DIAGNOSIS — Z13.29 SCREENING FOR HYPOTHYROIDISM: ICD-10-CM

## 2019-07-30 LAB — HBA1C MFR BLD: 9.9 %

## 2019-07-30 PROCEDURE — 99214 OFFICE O/P EST MOD 30 MIN: CPT | Performed by: INTERNAL MEDICINE

## 2019-07-30 PROCEDURE — 83036 HEMOGLOBIN GLYCOSYLATED A1C: CPT | Performed by: INTERNAL MEDICINE

## 2019-07-30 NOTE — PROGRESS NOTES
Dionne Bond is a 58 y.o. female, who presents with a chief complaint of   Chief Complaint   Patient presents with   • Blood Sugar Problem     up/down, x concerns, n/d on 3 x days ago        59 yo F with Type 2 diabetes previously on Jentadueto. Stopped taking on her own accord around 4/2019. She was seen by me prior to moving to [place], but has now not moved due to brother in law being diagnosed with renal cancer. Cancelled appointment in October.     Sunday had an episode where she had a dizziness, vomiting, and BG in the 260's range. She was not eating well due to nausea. She just started taking diabetes medication again on Sunday and BG's have been running around 170's. Diarrhea has stopped. Feels better now.     Still taking her other medications as directed per her. She has not gotten pap yet.          The following portions of the patient's history were reviewed and updated as appropriate: allergies, current medications, past family history, past medical history, past social history, past surgical history and problem list.    Allergies: Patient has no known allergies.    Current Outpatient Medications:   •  albuterol (PROVENTIL HFA;VENTOLIN HFA) 108 (90 Base) MCG/ACT inhaler, Inhale 2 puffs Every 4 (Four) Hours As Needed for Wheezing or Shortness of Air., Disp: 18 inhaler, Rfl: 0  •  aspirin 81 MG chewable tablet, Chew 81 mg Daily., Disp: , Rfl:   •  atorvastatin (LIPITOR) 40 MG tablet, TAKE 1 TABLET BY MOUTH EVERY NIGHT., Disp: 90 tablet, Rfl: 1  •  Blood Glucose Monitoring Suppl (ONE TOUCH ULTRA SYSTEM KIT) W/DEVICE kit, Check blood sugar 3-4 times per day for first 3 days and then once per day thereafter, Disp: 1 each, Rfl: 0  •  cetirizine (zyrTEC) 10 MG tablet, Take 10 mg by mouth Daily., Disp: , Rfl:   •  Ergocalciferol 400 UNITS tablet, Take 2 tablets by mouth Daily., Disp: 30 tablet, Rfl: 6  •  Fexofenadine HCl (MUCINEX ALLERGY PO), Take  by mouth., Disp: , Rfl:   •  Fluticasone  "Furoate-Vilanterol 100-25 MCG/INH aerosol powder , Inhale 1 puff Daily., Disp: , Rfl:   •  glucose blood test strip, Use as instructed, Disp: 100 each, Rfl: 12  •  ONE TOUCH LANCETS misc, Use to check blood sugar 3 times per day for 3-4 days and then once per day, Disp: 200 each, Rfl: 11  •  sertraline (ZOLOFT) 50 MG tablet, TAKE 2 TABLETS BY MOUTH DAILY., Disp: 90 tablet, Rfl: 1  •  linaGLIPtin-metFORMIN HCl ER (JENTADUETO XR) 2.5-1000 MG tablet sustained-release 24 hour, Take 2 tablets by mouth Every Morning., Disp: 180 tablet, Rfl: 3  Medications Discontinued During This Encounter   Medication Reason   • azithromycin (ZITHROMAX Z-TOÑO) 250 MG tablet *Therapy completed   • benzonatate (TESSALON) 100 MG capsule *Therapy completed   • Dextromethorphan Polistirex (DELSYM PO) *Therapy completed   • guaifenesin-codeine (GUAIFENESIN AC) 100-10 MG/5ML liquid *Therapy completed   • Linagliptin-Metformin HCl ER (JENTADUETO XR) 2.5-1000 MG tablet sustained-release 24 hour Reorder       Review of Systems   Constitutional: Positive for fatigue. Negative for fever.   HENT: Negative for congestion.    Respiratory: Negative for cough and shortness of breath.    Gastrointestinal: Positive for diarrhea, nausea and vomiting.   Genitourinary: Negative for difficulty urinating and dysuria.   Musculoskeletal: Negative for arthralgias.             /80 (BP Location: Left arm, Patient Position: Sitting, Cuff Size: Large Adult)   Pulse 66   Temp 98.7 °F (37.1 °C) (Oral)   Resp 18   Ht 167.6 cm (65.98\")   Wt 93.9 kg (207 lb)   LMP  (LMP Unknown)   SpO2 99%   BMI 33.43 kg/m²       Physical Exam   Constitutional: She is oriented to person, place, and time. She appears well-developed and well-nourished. No distress.   HENT:   Head: Normocephalic and atraumatic.   Right Ear: External ear normal.   Left Ear: External ear normal.   Mouth/Throat: Oropharynx is clear and moist. No oropharyngeal exudate.   Eyes: Conjunctivae are " normal. Right eye exhibits no discharge. Left eye exhibits no discharge. No scleral icterus.   Neck: Neck supple.   Cardiovascular: Normal rate, regular rhythm and normal heart sounds. Exam reveals no gallop and no friction rub.   No murmur heard.  Pulmonary/Chest: Effort normal and breath sounds normal. No respiratory distress. She has no wheezes. She has no rales.   Abdominal: Soft. Bowel sounds are normal. She exhibits no distension and no mass. There is no tenderness. There is no guarding.   Lymphadenopathy:     She has no cervical adenopathy.   Neurological: She is alert and oriented to person, place, and time.   Skin: Skin is warm. No rash noted.   Psychiatric: She has a normal mood and affect. Her behavior is normal.   Nursing note and vitals reviewed.        Results for orders placed or performed in visit on 07/30/19   POC Glycosylated Hemoglobin (Hb A1C)   Result Value Ref Range    Hemoglobin A1C 9.9 %           Dionne was seen today for blood sugar problem.    Diagnoses and all orders for this visit:    Uncontrolled type 2 diabetes mellitus with hyperglycemia (CMS/[facility])  -     POC Glycosylated Hemoglobin (Hb A1C)  -     CBC & Differential  -     Comprehensive Metabolic Panel  -     Microalbumin / Creatinine Urine Ratio - Urine, Clean Catch  -     CBC & Differential  -     Comprehensive Metabolic Panel  -     Lipid Panel With LDL / HDL Ratio  -     Urinalysis With Culture If Indicated - Urine, Clean Catch  -     Vitamin B12  -     Microalbumin / Creatinine Urine Ratio - Urine, Clean Catch  -     Insulin, Total    Nausea    Nausea and vomiting, intractability of vomiting not specified, unspecified vomiting type    Hyperglycemia  -     POC Glycosylated Hemoglobin (Hb A1C)    Mixed hyperlipidemia  -     Lipid Panel With LDL / HDL Ratio  -     Lipid Panel With LDL / HDL Ratio    Vitamin D deficiency  -     Vitamin D 25 Hydroxy    Screening for hypothyroidism  -     TSH Rfx On Abnormal To Free T4  -     TSH Rfx  On Abnormal To Free T4    Other orders  -     linaGLIPtin-metFORMIN HCl ER (JENTADUETO XR) 2.5-1000 MG tablet sustained-release 24 hour; Take 2 tablets by mouth Every Morning.      HgA1c is back up to 9.9%. I think that her diarrhea, vomiting and nausea are all related to high BG's. Will get back on medication and see her back in 2-3 weeks after having labs done so that we can review together. I am going to check insulin level this time to reassure her that she still makes insulin. May consider Victoza in future if her HgA1c is too high for us to get good control with two agents.     Goal BG's  range. If still high in 3 weeks, will start Victoza.     Wants to establish with Ob/Gyn and I have given her contact info for Dr. Myrick. She will call to schedule pap.         Return in about 3 weeks (around 8/20/2019) for Recheck of labs .    Maria L Perez MD  07/30/2019

## 2019-08-09 LAB
ALBUMIN SERPL-MCNC: 4.8 G/DL (ref 3.5–5.2)
ALBUMIN/CREAT UR: 2.4 MG/G CREAT (ref 0–30)
ALBUMIN/GLOB SERPL: 1.8 G/DL
ALP SERPL-CCNC: 94 U/L (ref 39–117)
ALT SERPL-CCNC: 39 U/L (ref 1–33)
APPEARANCE UR: CLEAR
AST SERPL-CCNC: 36 U/L (ref 1–32)
BACTERIA #/AREA URNS HPF: ABNORMAL /HPF
BACTERIA UR CULT: NORMAL
BACTERIA UR CULT: NORMAL
BASOPHILS # BLD AUTO: 0.03 10*3/MM3 (ref 0–0.2)
BASOPHILS NFR BLD AUTO: 0.5 % (ref 0–1.5)
BILIRUB SERPL-MCNC: 0.2 MG/DL (ref 0.2–1.2)
BILIRUB UR QL STRIP: NEGATIVE
BUN SERPL-MCNC: 15 MG/DL (ref 6–20)
BUN/CREAT SERPL: 15.5 (ref 7–25)
CALCIUM SERPL-MCNC: 9.6 MG/DL (ref 8.6–10.5)
CASTS URNS MICRO: ABNORMAL
CASTS URNS QL MICRO: PRESENT /LPF
CHLORIDE SERPL-SCNC: 101 MMOL/L (ref 98–107)
CHOLEST SERPL-MCNC: 257 MG/DL (ref 0–200)
CO2 SERPL-SCNC: 26.2 MMOL/L (ref 22–29)
COLOR UR: YELLOW
CREAT SERPL-MCNC: 0.97 MG/DL (ref 0.57–1)
CREAT UR-MCNC: 199.8 MG/DL
CRYSTALS URNS MICRO: ABNORMAL
EOSINOPHIL # BLD AUTO: 0.07 10*3/MM3 (ref 0–0.4)
EOSINOPHIL NFR BLD AUTO: 1.1 % (ref 0.3–6.2)
EPI CELLS #/AREA URNS HPF: ABNORMAL /HPF
ERYTHROCYTE [DISTWIDTH] IN BLOOD BY AUTOMATED COUNT: 14.4 % (ref 12.3–15.4)
GLOBULIN SER CALC-MCNC: 2.6 GM/DL
GLUCOSE SERPL-MCNC: 170 MG/DL (ref 65–99)
GLUCOSE UR QL: NEGATIVE
HCT VFR BLD AUTO: 44.2 % (ref 34–46.6)
HDLC SERPL-MCNC: 55 MG/DL (ref 40–60)
HGB BLD-MCNC: 13.7 G/DL (ref 12–15.9)
HGB UR QL STRIP: NEGATIVE
IMM GRANULOCYTES # BLD AUTO: 0.02 10*3/MM3 (ref 0–0.05)
IMM GRANULOCYTES NFR BLD AUTO: 0.3 % (ref 0–0.5)
INSULIN SERPL-ACNC: 19.3 UIU/ML (ref 2.6–24.9)
KETONES UR QL STRIP: NEGATIVE
LDLC SERPL CALC-MCNC: 161 MG/DL (ref 0–100)
LDLC/HDLC SERPL: 2.92 {RATIO}
LEUKOCYTE ESTERASE UR QL STRIP: ABNORMAL
LYMPHOCYTES # BLD AUTO: 1.99 10*3/MM3 (ref 0.7–3.1)
LYMPHOCYTES NFR BLD AUTO: 31 % (ref 19.6–45.3)
MCH RBC QN AUTO: 28.3 PG (ref 26.6–33)
MCHC RBC AUTO-ENTMCNC: 31 G/DL (ref 31.5–35.7)
MCV RBC AUTO: 91.3 FL (ref 79–97)
MICRO URNS: ABNORMAL
MICROALBUMIN UR-MCNC: 4.8 UG/ML
MONOCYTES # BLD AUTO: 0.42 10*3/MM3 (ref 0.1–0.9)
MONOCYTES NFR BLD AUTO: 6.5 % (ref 5–12)
MUCOUS THREADS URNS QL MICRO: PRESENT /HPF
NEUTROPHILS # BLD AUTO: 3.89 10*3/MM3 (ref 1.7–7)
NEUTROPHILS NFR BLD AUTO: 60.6 % (ref 42.7–76)
NITRITE UR QL STRIP: NEGATIVE
NRBC BLD AUTO-RTO: 0.2 /100 WBC (ref 0–0.2)
PH UR STRIP: 5.5 [PH] (ref 5–7.5)
PLATELET # BLD AUTO: 290 10*3/MM3 (ref 140–450)
POTASSIUM SERPL-SCNC: 4.3 MMOL/L (ref 3.5–5.2)
PROT SERPL-MCNC: 7.4 G/DL (ref 6–8.5)
PROT UR QL STRIP: NEGATIVE
RBC # BLD AUTO: 4.84 10*6/MM3 (ref 3.77–5.28)
RBC #/AREA URNS HPF: ABNORMAL /HPF
SODIUM SERPL-SCNC: 142 MMOL/L (ref 136–145)
SP GR UR: 1.02 (ref 1–1.03)
TRIGL SERPL-MCNC: 207 MG/DL (ref 0–150)
TSH SERPL DL<=0.005 MIU/L-ACNC: 1.8 MIU/ML (ref 0.27–4.2)
UNIDENT CRYS URNS QL MICRO: PRESENT
URINALYSIS REFLEX: ABNORMAL
UROBILINOGEN UR STRIP-MCNC: 0.2 MG/DL (ref 0.2–1)
VIT B12 SERPL-MCNC: 443 PG/ML (ref 211–946)
VLDLC SERPL CALC-MCNC: 41.4 MG/DL
WBC # BLD AUTO: 6.42 10*3/MM3 (ref 3.4–10.8)
WBC #/AREA URNS HPF: ABNORMAL /HPF

## 2019-08-13 ENCOUNTER — OFFICE VISIT (OUTPATIENT)
Dept: INTERNAL MEDICINE | Facility: CLINIC | Age: 59
End: 2019-08-13

## 2019-08-13 VITALS
TEMPERATURE: 99 F | BODY MASS INDEX: 32.95 KG/M2 | DIASTOLIC BLOOD PRESSURE: 80 MMHG | OXYGEN SATURATION: 98 % | RESPIRATION RATE: 16 BRPM | HEART RATE: 79 BPM | SYSTOLIC BLOOD PRESSURE: 146 MMHG | HEIGHT: 66 IN | WEIGHT: 205 LBS

## 2019-08-13 DIAGNOSIS — M25.572 ARTHRALGIA OF LEFT FOOT: ICD-10-CM

## 2019-08-13 DIAGNOSIS — E78.2 MIXED HYPERLIPIDEMIA: ICD-10-CM

## 2019-08-13 DIAGNOSIS — E55.9 VITAMIN D DEFICIENCY: ICD-10-CM

## 2019-08-13 DIAGNOSIS — E11.65 UNCONTROLLED TYPE 2 DIABETES MELLITUS WITH HYPERGLYCEMIA (HCC): Primary | ICD-10-CM

## 2019-08-13 DIAGNOSIS — F32.A DEPRESSION, UNSPECIFIED DEPRESSION TYPE: ICD-10-CM

## 2019-08-13 PROCEDURE — 99214 OFFICE O/P EST MOD 30 MIN: CPT | Performed by: INTERNAL MEDICINE

## 2019-08-13 RX ORDER — SERTRALINE HYDROCHLORIDE 100 MG/1
150 TABLET, FILM COATED ORAL DAILY
Qty: 135 TABLET | Refills: 0 | Status: SHIPPED | OUTPATIENT
Start: 2019-08-13 | End: 2019-11-12 | Stop reason: SDUPTHER

## 2019-08-13 RX ORDER — ATORVASTATIN CALCIUM 40 MG/1
40 TABLET, FILM COATED ORAL NIGHTLY
Qty: 90 TABLET | Refills: 0 | Status: SHIPPED | OUTPATIENT
Start: 2019-08-13 | End: 2019-11-12 | Stop reason: SDUPTHER

## 2019-08-13 NOTE — PROGRESS NOTES
Dionne Bond is a 58 y.o. female, who presents with a chief complaint of   Chief Complaint   Patient presents with   • Follow-up     3 x week f/u, lab results   • Diabetes     not better per pt, tearful today in office       57 yo F here for follow up of her diabetes. Taking Jentadueto XR 2 tablets per day and tolerating. She is on a HD plan and it's expensive. Fasting BG's have still been high. Right around 160-200 (170-180). She is eating better.     She is not taking her cholesterol medication faithfully. No CP or SOB.     She has pain in her left foot. Feels that she is having pain when walking and staying up on her feet.     She is under a lot of stress. Feels that Zoloft is not helping as much as it has in the past.          The following portions of the patient's history were reviewed and updated as appropriate: allergies, current medications, past family history, past medical history, past social history, past surgical history and problem list.    Allergies: Patient has no known allergies.    Current Outpatient Medications:   •  albuterol (PROVENTIL HFA;VENTOLIN HFA) 108 (90 Base) MCG/ACT inhaler, Inhale 2 puffs Every 4 (Four) Hours As Needed for Wheezing or Shortness of Air., Disp: 18 inhaler, Rfl: 0  •  aspirin 81 MG chewable tablet, Chew 81 mg Daily., Disp: , Rfl:   •  atorvastatin (LIPITOR) 40 MG tablet, Take 1 tablet by mouth Every Night., Disp: 90 tablet, Rfl: 0  •  Blood Glucose Monitoring Suppl (ONE TOUCH ULTRA SYSTEM KIT) W/DEVICE kit, Check blood sugar 3-4 times per day for first 3 days and then once per day thereafter, Disp: 1 each, Rfl: 0  •  cetirizine (zyrTEC) 10 MG tablet, Take 10 mg by mouth Daily., Disp: , Rfl:   •  Ergocalciferol 400 UNITS tablet, Take 2 tablets by mouth Daily., Disp: 30 tablet, Rfl: 6  •  Fexofenadine HCl (MUCINEX ALLERGY PO), Take  by mouth., Disp: , Rfl:   •  Fluticasone Furoate-Vilanterol 100-25 MCG/INH aerosol powder , Inhale 1 puff Daily., Disp: , Rfl:   •   "glucose blood test strip, Use as instructed, Disp: 100 each, Rfl: 12  •  linaGLIPtin-metFORMIN HCl ER (JENTADUETO XR) 2.5-1000 MG tablet sustained-release 24 hour, Take 2 tablets by mouth Every Morning., Disp: 180 tablet, Rfl: 3  •  ONE TOUCH LANCETS misc, Use to check blood sugar 3 times per day for 3-4 days and then once per day, Disp: 200 each, Rfl: 11  •  sertraline (ZOLOFT) 100 MG tablet, Take 1.5 tablets by mouth Daily., Disp: 135 tablet, Rfl: 0  Medications Discontinued During This Encounter   Medication Reason   • sertraline (ZOLOFT) 50 MG tablet Reorder   • atorvastatin (LIPITOR) 40 MG tablet Reorder       Review of Systems   Constitutional: Positive for fatigue. Negative for chills and fever.   HENT: Negative for congestion.    Respiratory: Negative for cough and shortness of breath.    Cardiovascular: Negative for chest pain and palpitations.   Gastrointestinal: Negative for abdominal pain, constipation and diarrhea.   Genitourinary: Negative for difficulty urinating and dysuria.   Skin: Negative for rash.             /80 (BP Location: Left arm, Patient Position: Sitting, Cuff Size: Large Adult)   Pulse 79   Temp 99 °F (37.2 °C) (Oral)   Resp 16   Ht 167.6 cm (65.98\")   Wt 93 kg (205 lb)   LMP  (LMP Unknown)   SpO2 98%   BMI 33.11 kg/m²       Physical Exam   Constitutional: She is oriented to person, place, and time. She appears well-developed and well-nourished. No distress.   HENT:   Head: Normocephalic and atraumatic.   Right Ear: External ear normal.   Left Ear: External ear normal.   Mouth/Throat: Oropharynx is clear and moist. No oropharyngeal exudate.   Eyes: Conjunctivae are normal. Right eye exhibits no discharge. Left eye exhibits no discharge. No scleral icterus.   Neck: Neck supple.   Cardiovascular: Normal rate, regular rhythm and normal heart sounds. Exam reveals no gallop and no friction rub.   No murmur heard.  Pulmonary/Chest: Effort normal and breath sounds normal. No " respiratory distress. She has no wheezes. She has no rales.   Musculoskeletal:   Pain of lateral left foot on palpation    Lymphadenopathy:     She has no cervical adenopathy.   Neurological: She is alert and oriented to person, place, and time.   Skin: Skin is warm. No rash noted.   Psychiatric: She has a normal mood and affect. Her behavior is normal.   Nursing note and vitals reviewed.        Results for orders placed or performed in visit on 07/30/19   Urine culture, Comprehensive - ,   Result Value Ref Range    Urine Culture Final report     Result 1 Comment    Comprehensive Metabolic Panel   Result Value Ref Range    Glucose 170 (H) 65 - 99 mg/dL    BUN 15 6 - 20 mg/dL    Creatinine 0.97 0.57 - 1.00 mg/dL    eGFR Non African Am 59 (L) >60 mL/min/1.73    eGFR African Am 72 >60 mL/min/1.73    BUN/Creatinine Ratio 15.5 7.0 - 25.0    Sodium 142 136 - 145 mmol/L    Potassium 4.3 3.5 - 5.2 mmol/L    Chloride 101 98 - 107 mmol/L    Total CO2 26.2 22.0 - 29.0 mmol/L    Calcium 9.6 8.6 - 10.5 mg/dL    Total Protein 7.4 6.0 - 8.5 g/dL    Albumin 4.80 3.50 - 5.20 g/dL    Globulin 2.6 gm/dL    A/G Ratio 1.8 g/dL    Total Bilirubin 0.2 0.2 - 1.2 mg/dL    Alkaline Phosphatase 94 39 - 117 U/L    AST (SGOT) 36 (H) 1 - 32 U/L    ALT (SGPT) 39 (H) 1 - 33 U/L   Lipid Panel With LDL / HDL Ratio   Result Value Ref Range    Total Cholesterol 257 (H) 0 - 200 mg/dL    Triglycerides 207 (H) 0 - 150 mg/dL    HDL Cholesterol 55 40 - 60 mg/dL    VLDL Cholesterol 41.4 mg/dL    LDL Cholesterol  161 (H) 0 - 100 mg/dL    LDL/HDL Ratio 2.92    TSH Rfx On Abnormal To Free T4   Result Value Ref Range    TSH 1.800 0.270 - 4.200 mIU/mL   Urinalysis With Culture If Indicated - Urine, Clean Catch   Result Value Ref Range    Specific Gravity, UA 1.024 1.005 - 1.030    pH, UA 5.5 5.0 - 7.5    Color, UA Yellow Yellow    Appearance, UA Clear Clear    Leukocytes, UA 1+ (A) Negative    Protein Negative Negative/Trace    Glucose, UA Negative Negative     Ketones Negative Negative    Blood, UA Negative Negative    Bilirubin, UA Negative Negative    Urobilinogen, UA 0.2 0.2 - 1.0 mg/dL    Nitrite, UA Negative Negative    Microscopic Examination See below:     Urinalysis Reflex Comment    Vitamin B12   Result Value Ref Range    Vitamin B-12 443 211 - 946 pg/mL   Microalbumin / Creatinine Urine Ratio - Urine, Clean Catch   Result Value Ref Range    Creatinine, Urine 199.8 Not Estab. mg/dL    Microalbumin, Urine 4.8 Not Estab. ug/mL    Microalbumin/Creatinine Ratio 2.4 0.0 - 30.0 mg/g creat   Insulin, Total   Result Value Ref Range    Insulin 19.3 2.6 - 24.9 uIU/mL   Microscopic Examination -   Result Value Ref Range    WBC, UA 0-5 0 - 5 /hpf    RBC, UA 0-2 0 - 2 /hpf    Epithelial Cells (non renal) 0-10 0 - 10 /hpf    Casts Present (A) None seen /lpf    Cast Type Hyaline casts N/A    Crystals, UA Present (A) N/A    Crystal Type Amorphous Sediment N/A    Mucus, UA Present Not Estab. /hpf    Bacteria, UA Few None seen/Few /hpf   POC Glycosylated Hemoglobin (Hb A1C)   Result Value Ref Range    Hemoglobin A1C 9.9 %   CBC & Differential   Result Value Ref Range    WBC 6.42 3.40 - 10.80 10*3/mm3    RBC 4.84 3.77 - 5.28 10*6/mm3    Hemoglobin 13.7 12.0 - 15.9 g/dL    Hematocrit 44.2 34.0 - 46.6 %    MCV 91.3 79.0 - 97.0 fL    MCH 28.3 26.6 - 33.0 pg    MCHC 31.0 (L) 31.5 - 35.7 g/dL    RDW 14.4 12.3 - 15.4 %    Platelets 290 140 - 450 10*3/mm3    Neutrophil Rel % 60.6 42.7 - 76.0 %    Lymphocyte Rel % 31.0 19.6 - 45.3 %    Monocyte Rel % 6.5 5.0 - 12.0 %    Eosinophil Rel % 1.1 0.3 - 6.2 %    Basophil Rel % 0.5 0.0 - 1.5 %    Neutrophils Absolute 3.89 1.70 - 7.00 10*3/mm3    Lymphocytes Absolute 1.99 0.70 - 3.10 10*3/mm3    Monocytes Absolute 0.42 0.10 - 0.90 10*3/mm3    Eosinophils Absolute 0.07 0.00 - 0.40 10*3/mm3    Basophils Absolute 0.03 0.00 - 0.20 10*3/mm3    Immature Granulocyte Rel % 0.3 0.0 - 0.5 %    Immature Grans Absolute 0.02 0.00 - 0.05 10*3/mm3    nRBC 0.2 0.0  - 0.2 /100 WBC           Dionne was seen today for follow-up and diabetes.    Diagnoses and all orders for this visit:    Uncontrolled type 2 diabetes mellitus with hyperglycemia (CMS/HCC)    Vitamin D deficiency    Mixed hyperlipidemia    Arthralgia of left foot  -     Ambulatory Referral to Podiatry    Depression, unspecified depression type    Other orders  -     sertraline (ZOLOFT) 100 MG tablet; Take 1.5 tablets by mouth Daily.  -     atorvastatin (LIPITOR) 40 MG tablet; Take 1 tablet by mouth Every Night.        Gave samples of Saxenda in office to start at 0.6mg daily to help with her BG's. Discussed possible side effects and reasons to call or stop medication. Goal BG's 100-160 in the AM    Restart her statin. Repeat labs in 3 months.     Go up on her Zoloft to 150mg.     Send to podiatry for her foot. Encouraged inserts.       Return in about 4 weeks (around 9/10/2019) for Recheck.    Maria L Perez MD  08/13/2019

## 2019-08-22 ENCOUNTER — TELEPHONE (OUTPATIENT)
Dept: INTERNAL MEDICINE | Facility: CLINIC | Age: 59
End: 2019-08-22

## 2019-08-22 NOTE — TELEPHONE ENCOUNTER
----- Message from Jennifer Wright sent at 8/22/2019  9:21 AM EDT -----  Contact: patient  Chris pt    Patient called to say that when she went to get this med, (even with the coupon), the cost was $295.00 a month. This is to much. She said that you had discussed another med to change it to. Please change it for her.      linaGLIPtin-metFORMIN HCl ER (JENTADUETO XR) 2.5-1000 MG tablet sustained-release 24 hour       Sig: Take 2 tablets by mouth Every Morning.           [facility] dariana

## 2019-08-22 NOTE — TELEPHONE ENCOUNTER
Can we please send in prescriptions separately for her? This may help with cost. Same doses, just 2 separate pills.

## 2019-08-23 NOTE — TELEPHONE ENCOUNTER
Divided RX as per Dr. Perez's instructions and sent to pharmacy.  Patient advised and voiced understanding.

## 2019-09-06 ENCOUNTER — OFFICE VISIT (OUTPATIENT)
Dept: INTERNAL MEDICINE | Facility: CLINIC | Age: 59
End: 2019-09-06

## 2019-09-06 VITALS
SYSTOLIC BLOOD PRESSURE: 132 MMHG | BODY MASS INDEX: 32.14 KG/M2 | RESPIRATION RATE: 14 BRPM | DIASTOLIC BLOOD PRESSURE: 78 MMHG | HEIGHT: 66 IN | TEMPERATURE: 99.6 F | HEART RATE: 76 BPM | WEIGHT: 200 LBS | OXYGEN SATURATION: 97 %

## 2019-09-06 DIAGNOSIS — E11.65 UNCONTROLLED TYPE 2 DIABETES MELLITUS WITH HYPERGLYCEMIA (HCC): Primary | ICD-10-CM

## 2019-09-06 DIAGNOSIS — E78.2 MIXED HYPERLIPIDEMIA: ICD-10-CM

## 2019-09-06 DIAGNOSIS — R74.8 ELEVATED LIVER ENZYMES: ICD-10-CM

## 2019-09-06 PROCEDURE — 99214 OFFICE O/P EST MOD 30 MIN: CPT | Performed by: INTERNAL MEDICINE

## 2019-09-06 NOTE — PROGRESS NOTES
Dionne Bond is a 58 y.o. female, who presents with a chief complaint of   Chief Complaint   Patient presents with   • Follow-up     1 week f/u   • Diabetes       57 yo F here for follow up of her uncontrolled diabetes. She is doing well on Metformin 1000mg PO BID, Tradjenta daily as well Saxenda at 0.6mg per day. Started 3 weeks ago and feels well on this. The highest sugar that she has had was 137, but usually 120's. She has lost weight since we saw her last. She is eating better and exercising.     She feels so much better. No belly pain or diarrhea. Slightly nausous from Metformin.         The following portions of the patient's history were reviewed and updated as appropriate: allergies, current medications, past family history, past medical history, past social history, past surgical history and problem list.    Allergies: Patient has no known allergies.    Current Outpatient Medications:   •  albuterol (PROVENTIL HFA;VENTOLIN HFA) 108 (90 Base) MCG/ACT inhaler, Inhale 2 puffs Every 4 (Four) Hours As Needed for Wheezing or Shortness of Air., Disp: 18 inhaler, Rfl: 0  •  aspirin 81 MG chewable tablet, Chew 81 mg Daily., Disp: , Rfl:   •  atorvastatin (LIPITOR) 40 MG tablet, Take 1 tablet by mouth Every Night., Disp: 90 tablet, Rfl: 0  •  Blood Glucose Monitoring Suppl (ONE TOUCH ULTRA SYSTEM KIT) W/DEVICE kit, Check blood sugar 3-4 times per day for first 3 days and then once per day thereafter, Disp: 1 each, Rfl: 0  •  cetirizine (zyrTEC) 10 MG tablet, Take 10 mg by mouth Daily., Disp: , Rfl:   •  Ergocalciferol 400 UNITS tablet, Take 2 tablets by mouth Daily., Disp: 30 tablet, Rfl: 6  •  Fexofenadine HCl (MUCINEX ALLERGY PO), Take  by mouth., Disp: , Rfl:   •  Fluticasone Furoate-Vilanterol 100-25 MCG/INH aerosol powder , Inhale 1 puff Daily., Disp: , Rfl:   •  glucose blood test strip, Use as instructed, Disp: 100 each, Rfl: 12  •  linagliptin (TRADJENTA) 5 MG tablet tablet, Take 1 tab in the morning,  "Disp: 90 tablet, Rfl: 1  •  metFORMIN (GLUCOPHAGE) 1000 MG tablet, Take 2 tabs in the morning, Disp: 180 tablet, Rfl: 1  •  ONE TOUCH LANCETS misc, Use to check blood sugar 3 times per day for 3-4 days and then once per day, Disp: 200 each, Rfl: 11  •  sertraline (ZOLOFT) 100 MG tablet, Take 1.5 tablets by mouth Daily., Disp: 135 tablet, Rfl: 0  •  Insulin Pen Needle (BD PEN NEEDLE LUIS U/F) 32G X 4 MM misc, 1 applicator Daily., Disp: 100 each, Rfl: 1  •  Liraglutide (VICTOZA) 18 MG/3ML solution pen-injector injection, Inject 1.2 mg under the skin into the appropriate area as directed Daily., Disp: 3 pen, Rfl: 1  There are no discontinued medications.    Review of Systems   Constitutional: Negative for chills, fatigue and fever.   Respiratory: Negative for cough and shortness of breath.    Cardiovascular: Negative for chest pain and palpitations.   Gastrointestinal: Negative for abdominal pain, constipation, diarrhea and nausea.   Musculoskeletal: Negative for arthralgias.             /78 (BP Location: Left arm, Patient Position: Sitting, Cuff Size: Large Adult)   Pulse 76   Temp 99.6 °F (37.6 °C) (Oral)   Resp 14   Ht 167.6 cm (65.98\")   Wt 90.7 kg (200 lb)   LMP  (LMP Unknown)   SpO2 97%   BMI 32.30 kg/m²       Physical Exam   Constitutional: She is oriented to person, place, and time. She appears well-developed and well-nourished. No distress.   HENT:   Head: Normocephalic and atraumatic.   Right Ear: External ear normal.   Left Ear: External ear normal.   Mouth/Throat: Oropharynx is clear and moist. No oropharyngeal exudate.   Eyes: Conjunctivae are normal. Right eye exhibits no discharge. Left eye exhibits no discharge. No scleral icterus.   Cardiovascular: Normal rate, regular rhythm and normal heart sounds. Exam reveals no gallop and no friction rub.   No murmur heard.  Pulmonary/Chest: Effort normal and breath sounds normal. No respiratory distress. She has no wheezes. She has no rales. "   Neurological: She is alert and oriented to person, place, and time.   Skin: Skin is warm. No rash noted.   Psychiatric: She has a normal mood and affect. Her behavior is normal.   Nursing note and vitals reviewed.        Results for orders placed or performed in visit on 07/30/19   Urine culture, Comprehensive - ,   Result Value Ref Range    Urine Culture Final report     Result 1 Comment    Comprehensive Metabolic Panel   Result Value Ref Range    Glucose 170 (H) 65 - 99 mg/dL    BUN 15 6 - 20 mg/dL    Creatinine 0.97 0.57 - 1.00 mg/dL    eGFR Non African Am 59 (L) >60 mL/min/1.73    eGFR African Am 72 >60 mL/min/1.73    BUN/Creatinine Ratio 15.5 7.0 - 25.0    Sodium 142 136 - 145 mmol/L    Potassium 4.3 3.5 - 5.2 mmol/L    Chloride 101 98 - 107 mmol/L    Total CO2 26.2 22.0 - 29.0 mmol/L    Calcium 9.6 8.6 - 10.5 mg/dL    Total Protein 7.4 6.0 - 8.5 g/dL    Albumin 4.80 3.50 - 5.20 g/dL    Globulin 2.6 gm/dL    A/G Ratio 1.8 g/dL    Total Bilirubin 0.2 0.2 - 1.2 mg/dL    Alkaline Phosphatase 94 39 - 117 U/L    AST (SGOT) 36 (H) 1 - 32 U/L    ALT (SGPT) 39 (H) 1 - 33 U/L   Lipid Panel With LDL / HDL Ratio   Result Value Ref Range    Total Cholesterol 257 (H) 0 - 200 mg/dL    Triglycerides 207 (H) 0 - 150 mg/dL    HDL Cholesterol 55 40 - 60 mg/dL    VLDL Cholesterol 41.4 mg/dL    LDL Cholesterol  161 (H) 0 - 100 mg/dL    LDL/HDL Ratio 2.92    TSH Rfx On Abnormal To Free T4   Result Value Ref Range    TSH 1.800 0.270 - 4.200 mIU/mL   Urinalysis With Culture If Indicated - Urine, Clean Catch   Result Value Ref Range    Specific Gravity, UA 1.024 1.005 - 1.030    pH, UA 5.5 5.0 - 7.5    Color, UA Yellow Yellow    Appearance, UA Clear Clear    Leukocytes, UA 1+ (A) Negative    Protein Negative Negative/Trace    Glucose, UA Negative Negative    Ketones Negative Negative    Blood, UA Negative Negative    Bilirubin, UA Negative Negative    Urobilinogen, UA 0.2 0.2 - 1.0 mg/dL    Nitrite, UA Negative Negative     Microscopic Examination See below:     Urinalysis Reflex Comment    Vitamin B12   Result Value Ref Range    Vitamin B-12 443 211 - 946 pg/mL   Microalbumin / Creatinine Urine Ratio - Urine, Clean Catch   Result Value Ref Range    Creatinine, Urine 199.8 Not Estab. mg/dL    Microalbumin, Urine 4.8 Not Estab. ug/mL    Microalbumin/Creatinine Ratio 2.4 0.0 - 30.0 mg/g creat   Insulin, Total   Result Value Ref Range    Insulin 19.3 2.6 - 24.9 uIU/mL   Microscopic Examination -   Result Value Ref Range    WBC, UA 0-5 0 - 5 /hpf    RBC, UA 0-2 0 - 2 /hpf    Epithelial Cells (non renal) 0-10 0 - 10 /hpf    Casts Present (A) None seen /lpf    Cast Type Hyaline casts N/A    Crystals, UA Present (A) N/A    Crystal Type Amorphous Sediment N/A    Mucus, UA Present Not Estab. /hpf    Bacteria, UA Few None seen/Few /hpf   POC Glycosylated Hemoglobin (Hb A1C)   Result Value Ref Range    Hemoglobin A1C 9.9 %   CBC & Differential   Result Value Ref Range    WBC 6.42 3.40 - 10.80 10*3/mm3    RBC 4.84 3.77 - 5.28 10*6/mm3    Hemoglobin 13.7 12.0 - 15.9 g/dL    Hematocrit 44.2 34.0 - 46.6 %    MCV 91.3 79.0 - 97.0 fL    MCH 28.3 26.6 - 33.0 pg    MCHC 31.0 (L) 31.5 - 35.7 g/dL    RDW 14.4 12.3 - 15.4 %    Platelets 290 140 - 450 10*3/mm3    Neutrophil Rel % 60.6 42.7 - 76.0 %    Lymphocyte Rel % 31.0 19.6 - 45.3 %    Monocyte Rel % 6.5 5.0 - 12.0 %    Eosinophil Rel % 1.1 0.3 - 6.2 %    Basophil Rel % 0.5 0.0 - 1.5 %    Neutrophils Absolute 3.89 1.70 - 7.00 10*3/mm3    Lymphocytes Absolute 1.99 0.70 - 3.10 10*3/mm3    Monocytes Absolute 0.42 0.10 - 0.90 10*3/mm3    Eosinophils Absolute 0.07 0.00 - 0.40 10*3/mm3    Basophils Absolute 0.03 0.00 - 0.20 10*3/mm3    Immature Granulocyte Rel % 0.3 0.0 - 0.5 %    Immature Grans Absolute 0.02 0.00 - 0.05 10*3/mm3    nRBC 0.2 0.0 - 0.2 /100 WBC           Dionne was seen today for follow-up and diabetes.    Diagnoses and all orders for this visit:    Uncontrolled type 2 diabetes mellitus with  hyperglycemia (CMS/HCC)  -     Comprehensive Metabolic Panel  -     Hemoglobin A1c    Mixed hyperlipidemia  -     Lipid Panel With LDL / HDL Ratio    Elevated liver enzymes  -     Comprehensive Metabolic Panel    Other orders  -     Liraglutide (VICTOZA) 18 MG/3ML solution pen-injector injection; Inject 1.2 mg under the skin into the appropriate area as directed Daily.  -     Insulin Pen Needle (BD PEN NEEDLE LUIS U/F) 32G X 4 MM misc; 1 applicator Daily.      Will increase her Victoza to 1.2mg daily. She is doing great on this. She will see me back in 2 months with recheck of her blood work to see what progress she has made. My goal would be to wean medication as she loses weight. Goal weight loss 15-20lbs in the next 8 weeks which I think she can do.     Call if concerns before I see her.    Return in about 10 weeks (around 11/15/2019) for Recheck.    Maria L Perez MD  09/06/2019

## 2019-11-12 RX ORDER — ATORVASTATIN CALCIUM 40 MG/1
TABLET, FILM COATED ORAL
Qty: 90 TABLET | Refills: 0 | Status: SHIPPED | OUTPATIENT
Start: 2019-11-12 | End: 2020-03-30

## 2019-11-12 RX ORDER — SERTRALINE HYDROCHLORIDE 100 MG/1
TABLET, FILM COATED ORAL
Qty: 135 TABLET | Refills: 0 | Status: SHIPPED | OUTPATIENT
Start: 2019-11-12 | End: 2020-04-21 | Stop reason: SDUPTHER

## 2019-11-20 LAB
ALBUMIN SERPL-MCNC: 4.9 G/DL (ref 3.5–5.2)
ALBUMIN/GLOB SERPL: 2.1 G/DL
ALP SERPL-CCNC: 92 U/L (ref 39–117)
ALT SERPL-CCNC: 15 U/L (ref 1–33)
AST SERPL-CCNC: 16 U/L (ref 1–32)
BILIRUB SERPL-MCNC: 0.2 MG/DL (ref 0.2–1.2)
BUN SERPL-MCNC: 12 MG/DL (ref 6–20)
BUN/CREAT SERPL: 14.6 (ref 7–25)
CALCIUM SERPL-MCNC: 9.7 MG/DL (ref 8.6–10.5)
CHLORIDE SERPL-SCNC: 101 MMOL/L (ref 98–107)
CHOLEST SERPL-MCNC: 172 MG/DL (ref 0–200)
CO2 SERPL-SCNC: 25.5 MMOL/L (ref 22–29)
CREAT SERPL-MCNC: 0.82 MG/DL (ref 0.57–1)
GLOBULIN SER CALC-MCNC: 2.3 GM/DL
GLUCOSE SERPL-MCNC: 116 MG/DL (ref 65–99)
HBA1C MFR BLD: 7 % (ref 4.8–5.6)
HDLC SERPL-MCNC: 53 MG/DL (ref 40–60)
LDLC SERPL CALC-MCNC: 94 MG/DL (ref 0–100)
LDLC/HDLC SERPL: 1.78 {RATIO}
POTASSIUM SERPL-SCNC: 4.9 MMOL/L (ref 3.5–5.2)
PROT SERPL-MCNC: 7.2 G/DL (ref 6–8.5)
SODIUM SERPL-SCNC: 143 MMOL/L (ref 136–145)
TRIGL SERPL-MCNC: 123 MG/DL (ref 0–150)
VLDLC SERPL CALC-MCNC: 24.6 MG/DL

## 2019-11-21 ENCOUNTER — OFFICE VISIT (OUTPATIENT)
Dept: INTERNAL MEDICINE | Facility: CLINIC | Age: 59
End: 2019-11-21

## 2019-11-21 VITALS
WEIGHT: 185 LBS | SYSTOLIC BLOOD PRESSURE: 112 MMHG | HEIGHT: 66 IN | DIASTOLIC BLOOD PRESSURE: 72 MMHG | OXYGEN SATURATION: 98 % | BODY MASS INDEX: 29.73 KG/M2 | RESPIRATION RATE: 16 BRPM | HEART RATE: 74 BPM | TEMPERATURE: 98.8 F

## 2019-11-21 DIAGNOSIS — F32.A DEPRESSION, UNSPECIFIED DEPRESSION TYPE: ICD-10-CM

## 2019-11-21 DIAGNOSIS — Z12.31 ENCOUNTER FOR SCREENING MAMMOGRAM FOR MALIGNANT NEOPLASM OF BREAST: ICD-10-CM

## 2019-11-21 DIAGNOSIS — E11.9 TYPE 2 DIABETES MELLITUS WITHOUT COMPLICATION, WITHOUT LONG-TERM CURRENT USE OF INSULIN (HCC): Primary | ICD-10-CM

## 2019-11-21 DIAGNOSIS — E78.2 MIXED HYPERLIPIDEMIA: ICD-10-CM

## 2019-11-21 PROCEDURE — 99213 OFFICE O/P EST LOW 20 MIN: CPT | Performed by: INTERNAL MEDICINE

## 2019-11-21 NOTE — PROGRESS NOTES
Dionne Bond is a 58 y.o. female, who presents with a chief complaint of   Chief Complaint   Patient presents with   • Follow-up     10x weel f/u, lab results   • Diabetes       57 yo F with type 2 diabetes and HLD here for follow up and doing great. She is walking and doing elliptical mainly. She is eating yogurt in the morning for breakfast. She is taking lunch and eating a sandwhich on low carb bread. Trying too eat vegetables with lean proteins.     She is feeling well on medications. No low BG's. Lowest was 78. No urinary issues with the Tradjenta.     She is seeing Dr. Luciano in the next 3 months after holiday          The following portions of the patient's history were reviewed and updated as appropriate: allergies, current medications, past family history, past medical history, past social history, past surgical history and problem list.    Allergies: Patient has no known allergies.    Current Outpatient Medications:   •  albuterol (PROVENTIL HFA;VENTOLIN HFA) 108 (90 Base) MCG/ACT inhaler, Inhale 2 puffs Every 4 (Four) Hours As Needed for Wheezing or Shortness of Air., Disp: 18 inhaler, Rfl: 0  •  aspirin 81 MG chewable tablet, Chew 81 mg Daily., Disp: , Rfl:   •  atorvastatin (LIPITOR) 40 MG tablet, TAKE 1 TABLET BY MOUTH EVERY DAY AT NIGHT, Disp: 90 tablet, Rfl: 0  •  Blood Glucose Monitoring Suppl (ONE TOUCH ULTRA SYSTEM KIT) W/DEVICE kit, Check blood sugar 3-4 times per day for first 3 days and then once per day thereafter, Disp: 1 each, Rfl: 0  •  cetirizine (zyrTEC) 10 MG tablet, Take 10 mg by mouth Daily., Disp: , Rfl:   •  Ergocalciferol 400 UNITS tablet, Take 2 tablets by mouth Daily., Disp: 30 tablet, Rfl: 6  •  Fexofenadine HCl (MUCINEX ALLERGY PO), Take  by mouth., Disp: , Rfl:   •  Fluticasone Furoate-Vilanterol 100-25 MCG/INH aerosol powder , Inhale 1 puff Daily., Disp: , Rfl:   •  glucose blood test strip, Use as instructed, Disp: 100 each, Rfl: 12  •  Insulin Pen Needle (BD PEN  "NEEDLE LUIS U/F) 32G X 4 MM misc, 1 applicator Daily., Disp: 100 each, Rfl: 1  •  linagliptin (TRADJENTA) 5 MG tablet tablet, Take 1 tab in the morning, Disp: 90 tablet, Rfl: 2  •  Liraglutide (VICTOZA) 18 MG/3ML solution pen-injector injection, Inject 1.2 mg under the skin into the appropriate area as directed Daily., Disp: 6 pen, Rfl: 2  •  metFORMIN (GLUCOPHAGE) 1000 MG tablet, Take 2 tabs in the morning, Disp: 180 tablet, Rfl: 2  •  ONE TOUCH LANCETS misc, Use to check blood sugar 3 times per day for 3-4 days and then once per day, Disp: 200 each, Rfl: 11  •  sertraline (ZOLOFT) 100 MG tablet, TAKE 1 AND 1/2 TABLETS BY MOUTH EVERY DAY, Disp: 135 tablet, Rfl: 0  Medications Discontinued During This Encounter   Medication Reason   • linagliptin (TRADJENTA) 5 MG tablet tablet Reorder   • metFORMIN (GLUCOPHAGE) 1000 MG tablet Reorder   • Liraglutide (VICTOZA) 18 MG/3ML solution pen-injector injection Reorder       Review of Systems   Constitutional: Negative for chills and fatigue.   HENT: Negative for congestion and rhinorrhea.    Respiratory: Negative for cough and shortness of breath.    Cardiovascular: Negative for chest pain and palpitations.   Gastrointestinal: Negative for abdominal pain, diarrhea, nausea and vomiting.             /72 (BP Location: Left arm, Patient Position: Sitting, Cuff Size: Large Adult)   Pulse 74   Temp 98.8 °F (37.1 °C) (Oral)   Resp 16   Ht 167.6 cm (65.98\")   Wt 83.9 kg (185 lb)   LMP  (LMP Unknown)   SpO2 98%   BMI 29.88 kg/m²       Physical Exam   Constitutional: She is oriented to person, place, and time. She appears well-developed and well-nourished. No distress.   HENT:   Head: Normocephalic and atraumatic.   Right Ear: External ear normal.   Left Ear: External ear normal.   Mouth/Throat: Oropharynx is clear and moist. No oropharyngeal exudate.   Eyes: Conjunctivae are normal. Right eye exhibits no discharge. Left eye exhibits no discharge. No scleral icterus. "   Neck: Neck supple.   Cardiovascular: Normal rate, regular rhythm and normal heart sounds. Exam reveals no gallop and no friction rub.   No murmur heard.  Pulmonary/Chest: Effort normal and breath sounds normal. No respiratory distress. She has no wheezes. She has no rales.   Lymphadenopathy:     She has no cervical adenopathy.   Neurological: She is alert and oriented to person, place, and time.   Skin: Skin is warm. No rash noted.   Psychiatric: She has a normal mood and affect. Her behavior is normal.   Nursing note and vitals reviewed.        Results for orders placed or performed in visit on 09/06/19   Comprehensive Metabolic Panel   Result Value Ref Range    Glucose 116 (H) 65 - 99 mg/dL    BUN 12 6 - 20 mg/dL    Creatinine 0.82 0.57 - 1.00 mg/dL    eGFR Non African Am 72 >60 mL/min/1.73    eGFR African Am 87 >60 mL/min/1.73    BUN/Creatinine Ratio 14.6 7.0 - 25.0    Sodium 143 136 - 145 mmol/L    Potassium 4.9 3.5 - 5.2 mmol/L    Chloride 101 98 - 107 mmol/L    Total CO2 25.5 22.0 - 29.0 mmol/L    Calcium 9.7 8.6 - 10.5 mg/dL    Total Protein 7.2 6.0 - 8.5 g/dL    Albumin 4.90 3.50 - 5.20 g/dL    Globulin 2.3 gm/dL    A/G Ratio 2.1 g/dL    Total Bilirubin 0.2 0.2 - 1.2 mg/dL    Alkaline Phosphatase 92 39 - 117 U/L    AST (SGOT) 16 1 - 32 U/L    ALT (SGPT) 15 1 - 33 U/L   Hemoglobin A1c   Result Value Ref Range    Hemoglobin A1C 7.00 (H) 4.80 - 5.60 %   Lipid Panel With LDL / HDL Ratio   Result Value Ref Range    Total Cholesterol 172 0 - 200 mg/dL    Triglycerides 123 0 - 150 mg/dL    HDL Cholesterol 53 40 - 60 mg/dL    VLDL Cholesterol 24.6 mg/dL    LDL Cholesterol  94 0 - 100 mg/dL    LDL/HDL Ratio 1.78            Dionne was seen today for follow-up and diabetes.    Diagnoses and all orders for this visit:    Type 2 diabetes mellitus without complication, without long-term current use of insulin (CMS/[facility])    Mixed hyperlipidemia    Depression, unspecified depression type    Encounter for screening  mammogram for malignant neoplasm of breast  -     Mammo Screening Bilateral With CAD    Other orders  -     linagliptin (TRADJENTA) 5 MG tablet tablet; Take 1 tab in the morning  -     metFORMIN (GLUCOPHAGE) 1000 MG tablet; Take 2 tabs in the morning  -     Liraglutide (VICTOZA) 18 MG/3ML solution pen-injector injection; Inject 1.2 mg under the skin into the appropriate area as directed Daily.      She looks great. So proud of her and her weight loss. Now down 22lbs in the last 4 months with a drop in her HgA1c by 3 points. Continue on her medications as ordered. Follow up with blood work in 3-4 months. Will get mammogram and pap.    Call me if she needs anything else and keep up the good work!     Return in about 4 months (around 3/21/2020) for Recheck, Annual physical.    Maria L Perez MD  11/21/2019

## 2020-02-20 ENCOUNTER — OFFICE VISIT (OUTPATIENT)
Dept: INTERNAL MEDICINE | Facility: CLINIC | Age: 60
End: 2020-02-20

## 2020-02-20 VITALS
SYSTOLIC BLOOD PRESSURE: 124 MMHG | HEIGHT: 66 IN | WEIGHT: 185 LBS | OXYGEN SATURATION: 98 % | BODY MASS INDEX: 29.73 KG/M2 | TEMPERATURE: 99.2 F | HEART RATE: 79 BPM | RESPIRATION RATE: 16 BRPM | DIASTOLIC BLOOD PRESSURE: 78 MMHG

## 2020-02-20 DIAGNOSIS — R10.11 RUQ ABDOMINAL PAIN: Primary | ICD-10-CM

## 2020-02-20 PROCEDURE — 99214 OFFICE O/P EST MOD 30 MIN: CPT | Performed by: NURSE PRACTITIONER

## 2020-02-20 RX ORDER — OMEPRAZOLE 40 MG/1
40 CAPSULE, DELAYED RELEASE ORAL
Qty: 30 CAPSULE | Refills: 1 | Status: SHIPPED | OUTPATIENT
Start: 2020-02-20 | End: 2020-08-31

## 2020-02-20 NOTE — PROGRESS NOTES
"Subjective   Dionne Bond is a 59 y.o. female presenting today for   Chief Complaint   Patient presents with   • Rib Pain     R rib pain, shooting pain, 4-5 days, when pt coughs it hurts, if pt presses on area it hurts       Abdominal Pain   This is a new problem. Episode onset: 4-5 days ago. The onset quality is sudden. The problem occurs intermittently. The problem has been gradually worsening. The pain is located in the RUQ. Pertinent negatives include no constipation, diarrhea, fever, nausea or vomiting. The pain is aggravated by eating. The pain is relieved by nothing. Treatments tried: tums.        The following portions of the patient's history were reviewed and updated as appropriate: allergies, current medications, past family history, past medical history, past social history, past surgical history and problem list.    Review of Systems   Constitutional: Negative for chills and fever.   Gastrointestinal: Positive for abdominal pain. Negative for constipation, diarrhea, nausea, vomiting and GERD.   Genitourinary: Negative.        Objective   Vitals:    02/20/20 1544   BP: 124/78   BP Location: Left arm   Patient Position: Sitting   Cuff Size: Large Adult   Pulse: 79   Resp: 16   Temp: 99.2 °F (37.3 °C)   TempSrc: Oral   SpO2: 98%   Weight: 83.9 kg (185 lb)   Height: 167.6 cm (65.98\")     Body mass index is 29.88 kg/m².  Nursing notes and vitals reviewed.    Physical Exam   Constitutional: She is oriented to person, place, and time. She appears well-developed and well-nourished. No distress.   Cardiovascular: Regular rhythm, S1 normal and S2 normal.   Pulmonary/Chest: Effort normal and breath sounds normal.   Abdominal: Soft. Bowel sounds are normal. She exhibits no distension and no mass. There is no hepatosplenomegaly. There is tenderness in the right upper quadrant. There is no rebound and no guarding.   Neurological: She is alert and oriented to person, place, and time.   Psychiatric: She has a normal " mood and affect. Her behavior is normal. Thought content normal. She is attentive.         Assessment/Plan   Dionne was seen today for rib pain.    Diagnoses and all orders for this visit:    RUQ abdominal pain  -     CBC & Differential  -     Comprehensive Metabolic Panel  -     C-reactive Protein  -     Sedimentation Rate  -     Amylase  -     Lipase  -     omeprazole (priLOSEC) 40 MG capsule; Take 1 capsule by mouth Every Morning Before Breakfast.  -     CT Abdomen Pelvis With Contrast; Future      May take anti-inflammatory for pain.  Advised to keep diet very bland until f/u.      Return in about 10 days (around 3/1/2020).

## 2020-02-21 LAB
ALBUMIN SERPL-MCNC: 4.6 G/DL (ref 3.5–5.2)
ALBUMIN/GLOB SERPL: 1.8 G/DL
ALP SERPL-CCNC: 82 U/L (ref 39–117)
ALT SERPL-CCNC: 14 U/L (ref 1–33)
AMYLASE SERPL-CCNC: 63 U/L (ref 28–100)
AST SERPL-CCNC: 15 U/L (ref 1–32)
BASOPHILS # BLD AUTO: 0.03 10*3/MM3 (ref 0–0.2)
BASOPHILS NFR BLD AUTO: 0.4 % (ref 0–1.5)
BILIRUB SERPL-MCNC: 0.2 MG/DL (ref 0.2–1.2)
BUN SERPL-MCNC: 22 MG/DL (ref 6–20)
BUN/CREAT SERPL: 26.2 (ref 7–25)
CALCIUM SERPL-MCNC: 9.8 MG/DL (ref 8.6–10.5)
CHLORIDE SERPL-SCNC: 99 MMOL/L (ref 98–107)
CO2 SERPL-SCNC: 27.2 MMOL/L (ref 22–29)
CREAT SERPL-MCNC: 0.84 MG/DL (ref 0.57–1)
CRP SERPL-MCNC: 0.19 MG/DL (ref 0–0.5)
EOSINOPHIL # BLD AUTO: 0.04 10*3/MM3 (ref 0–0.4)
EOSINOPHIL NFR BLD AUTO: 0.5 % (ref 0.3–6.2)
ERYTHROCYTE [DISTWIDTH] IN BLOOD BY AUTOMATED COUNT: 13.5 % (ref 12.3–15.4)
ERYTHROCYTE [SEDIMENTATION RATE] IN BLOOD BY WESTERGREN METHOD: 15 MM/HR (ref 0–30)
GLOBULIN SER CALC-MCNC: 2.5 GM/DL
GLUCOSE SERPL-MCNC: 100 MG/DL (ref 65–99)
HCT VFR BLD AUTO: 40.8 % (ref 34–46.6)
HGB BLD-MCNC: 13.6 G/DL (ref 12–15.9)
IMM GRANULOCYTES # BLD AUTO: 0.03 10*3/MM3 (ref 0–0.05)
IMM GRANULOCYTES NFR BLD AUTO: 0.4 % (ref 0–0.5)
LIPASE SERPL-CCNC: 32 U/L (ref 13–60)
LYMPHOCYTES # BLD AUTO: 2.33 10*3/MM3 (ref 0.7–3.1)
LYMPHOCYTES NFR BLD AUTO: 30.4 % (ref 19.6–45.3)
MCH RBC QN AUTO: 29.5 PG (ref 26.6–33)
MCHC RBC AUTO-ENTMCNC: 33.3 G/DL (ref 31.5–35.7)
MCV RBC AUTO: 88.5 FL (ref 79–97)
MONOCYTES # BLD AUTO: 0.47 10*3/MM3 (ref 0.1–0.9)
MONOCYTES NFR BLD AUTO: 6.1 % (ref 5–12)
NEUTROPHILS # BLD AUTO: 4.76 10*3/MM3 (ref 1.7–7)
NEUTROPHILS NFR BLD AUTO: 62.2 % (ref 42.7–76)
NRBC BLD AUTO-RTO: 0 /100 WBC (ref 0–0.2)
PLATELET # BLD AUTO: 285 10*3/MM3 (ref 140–450)
POTASSIUM SERPL-SCNC: 4.2 MMOL/L (ref 3.5–5.2)
PROT SERPL-MCNC: 7.1 G/DL (ref 6–8.5)
RBC # BLD AUTO: 4.61 10*6/MM3 (ref 3.77–5.28)
SODIUM SERPL-SCNC: 139 MMOL/L (ref 136–145)
WBC # BLD AUTO: 7.66 10*3/MM3 (ref 3.4–10.8)

## 2020-02-28 ENCOUNTER — HOSPITAL ENCOUNTER (OUTPATIENT)
Dept: CT IMAGING | Facility: HOSPITAL | Age: 60
Discharge: HOME | End: 2020-02-28
Admitting: NURSE PRACTITIONER

## 2020-02-28 DIAGNOSIS — R10.11 RUQ ABDOMINAL PAIN: ICD-10-CM

## 2020-02-28 PROCEDURE — 74177 CT ABD & PELVIS W/CONTRAST: CPT

## 2020-02-28 PROCEDURE — 0 IOPAMIDOL PER 1 ML: Performed by: NURSE PRACTITIONER

## 2020-02-28 RX ADMIN — IOPAMIDOL 100 ML: 755 INJECTION, SOLUTION INTRAVENOUS at 10:24

## 2020-03-02 ENCOUNTER — OFFICE VISIT (OUTPATIENT)
Dept: INTERNAL MEDICINE | Facility: CLINIC | Age: 60
End: 2020-03-02

## 2020-03-02 VITALS
BODY MASS INDEX: 30.05 KG/M2 | WEIGHT: 187 LBS | OXYGEN SATURATION: 99 % | TEMPERATURE: 99 F | HEIGHT: 66 IN | DIASTOLIC BLOOD PRESSURE: 72 MMHG | RESPIRATION RATE: 16 BRPM | SYSTOLIC BLOOD PRESSURE: 120 MMHG | HEART RATE: 72 BPM

## 2020-03-02 DIAGNOSIS — R10.11 RUQ ABDOMINAL PAIN: Primary | ICD-10-CM

## 2020-03-02 DIAGNOSIS — E78.2 MIXED HYPERLIPIDEMIA: ICD-10-CM

## 2020-03-02 DIAGNOSIS — Z00.00 ROUTINE HEALTH MAINTENANCE: ICD-10-CM

## 2020-03-02 DIAGNOSIS — E55.9 VITAMIN D DEFICIENCY: ICD-10-CM

## 2020-03-02 DIAGNOSIS — E11.9 TYPE 2 DIABETES MELLITUS WITHOUT COMPLICATION, WITHOUT LONG-TERM CURRENT USE OF INSULIN (HCC): ICD-10-CM

## 2020-03-02 PROBLEM — R74.8 ELEVATED LIVER ENZYMES: Status: RESOLVED | Noted: 2019-09-06 | Resolved: 2020-03-02

## 2020-03-02 PROBLEM — Z12.11 COLON CANCER SCREENING: Status: RESOLVED | Noted: 2018-05-07 | Resolved: 2020-03-02

## 2020-03-02 PROBLEM — N76.4 ABSCESS OF VULVA: Status: RESOLVED | Noted: 2018-04-19 | Resolved: 2020-03-02

## 2020-03-02 PROCEDURE — 99213 OFFICE O/P EST LOW 20 MIN: CPT | Performed by: NURSE PRACTITIONER

## 2020-03-02 NOTE — PROGRESS NOTES
"Subjective   Dionne Bond is a 59 y.o. female presenting today for follow up of   Chief Complaint   Patient presents with   • Follow-up     10 x day f/u, lab results   • Abdominal Pain     \"some better\", , random pain at times    • Nasal Congestion     \" passed it on to me\", cough, sore throat        History of Present Illness     Pt returns for 10d f/u r/t abd pain.    The onset quality is sudden. The problem occurs intermittently. The problem has been gradually worsening. The pain is located in the RUQ. Pertinent negatives include no constipation, diarrhea, fever, nausea or vomiting. The pain is aggravated by eating. The pain is relieved by nothing. Treatments tried: tums.     At her previous visit I started her on omeprazole.     She reports pain has essentially resolved.    The following portions of the patient's history were reviewed and updated as appropriate: allergies, current medications, past family history, past medical history, past social history, past surgical history and problem list.    Review of Systems   Constitutional: Negative.    HENT: Negative.    Eyes: Negative.    Respiratory: Negative.    Cardiovascular: Negative.    Gastrointestinal: Positive for abdominal pain.   Endocrine: Negative.    Genitourinary: Negative.    Musculoskeletal: Negative.    Skin: Negative.    Neurological: Negative.    Hematological: Negative.    Psychiatric/Behavioral: Negative.        Objective   Vitals:    03/02/20 1523   BP: 120/72   BP Location: Right arm   Patient Position: Sitting   Cuff Size: Large Adult   Pulse: 72   Resp: 16   Temp: 99 °F (37.2 °C)   TempSrc: Oral   SpO2: 99%   Weight: 84.8 kg (187 lb)   Height: 167.6 cm (65.98\")     Body mass index is 30.2 kg/m².  Nursing notes and vital reviewed.    Physical Exam   Constitutional: She is oriented to person, place, and time. She appears well-developed and well-nourished. No distress.   Cardiovascular: Regular rhythm, S1 normal and S2 normal. "   Pulmonary/Chest: Effort normal and breath sounds normal.   Neurological: She is alert and oriented to person, place, and time.   Psychiatric: She has a normal mood and affect. Her behavior is normal. Thought content normal. She is attentive.         Assessment/Plan   Diagnoses and all orders for this visit:    1. RUQ abdominal pain (Primary)    Discussed lab and CT results. PVU.  Will finish out 30 day supply of PPI and then d/c.  Avoid fried foods.  Will notify if recurs.    Return in about 3 months (around 6/2/2020).

## 2020-03-07 ENCOUNTER — RESULTS ENCOUNTER (OUTPATIENT)
Dept: INTERNAL MEDICINE | Facility: CLINIC | Age: 60
End: 2020-03-07

## 2020-03-07 DIAGNOSIS — E55.9 VITAMIN D DEFICIENCY: ICD-10-CM

## 2020-03-07 DIAGNOSIS — E78.2 MIXED HYPERLIPIDEMIA: ICD-10-CM

## 2020-03-07 DIAGNOSIS — Z00.00 ROUTINE HEALTH MAINTENANCE: ICD-10-CM

## 2020-03-07 DIAGNOSIS — E11.9 TYPE 2 DIABETES MELLITUS WITHOUT COMPLICATION, WITHOUT LONG-TERM CURRENT USE OF INSULIN (HCC): ICD-10-CM

## 2020-03-30 RX ORDER — ATORVASTATIN CALCIUM 40 MG/1
TABLET, FILM COATED ORAL
Qty: 90 TABLET | Refills: 0 | Status: SHIPPED | OUTPATIENT
Start: 2020-03-30 | End: 2020-08-31 | Stop reason: SDUPTHER

## 2020-04-21 RX ORDER — SERTRALINE HYDROCHLORIDE 100 MG/1
150 TABLET, FILM COATED ORAL DAILY
Qty: 135 TABLET | Refills: 0 | Status: SHIPPED | OUTPATIENT
Start: 2020-04-21 | End: 2020-08-31 | Stop reason: SDUPTHER

## 2020-08-25 ENCOUNTER — TELEPHONE (OUTPATIENT)
Dept: INTERNAL MEDICINE | Facility: CLINIC | Age: 60
End: 2020-08-25

## 2020-08-26 LAB
25(OH)D3+25(OH)D2 SERPL-MCNC: 38.7 NG/ML (ref 30–100)
ALBUMIN SERPL-MCNC: 4.7 G/DL (ref 3.5–5.2)
ALBUMIN/GLOB SERPL: 2.9 G/DL
ALP SERPL-CCNC: 79 U/L (ref 39–117)
ALT SERPL-CCNC: 12 U/L (ref 1–33)
AST SERPL-CCNC: 17 U/L (ref 1–32)
BASOPHILS # BLD AUTO: 0.03 10*3/MM3 (ref 0–0.2)
BASOPHILS NFR BLD AUTO: 0.5 % (ref 0–1.5)
BILIRUB SERPL-MCNC: 0.2 MG/DL (ref 0–1.2)
BUN SERPL-MCNC: 12 MG/DL (ref 6–20)
BUN/CREAT SERPL: 12.4 (ref 7–25)
CALCIUM SERPL-MCNC: 9.3 MG/DL (ref 8.6–10.5)
CHLORIDE SERPL-SCNC: 103 MMOL/L (ref 98–107)
CHOLEST SERPL-MCNC: 186 MG/DL (ref 0–200)
CHOLEST/HDLC SERPL: 3.26 {RATIO}
CO2 SERPL-SCNC: 25.2 MMOL/L (ref 22–29)
CREAT SERPL-MCNC: 0.97 MG/DL (ref 0.57–1)
EOSINOPHIL # BLD AUTO: 0.13 10*3/MM3 (ref 0–0.4)
EOSINOPHIL NFR BLD AUTO: 2.2 % (ref 0.3–6.2)
ERYTHROCYTE [DISTWIDTH] IN BLOOD BY AUTOMATED COUNT: 13.6 % (ref 12.3–15.4)
GLOBULIN SER CALC-MCNC: 1.6 GM/DL
GLUCOSE SERPL-MCNC: 113 MG/DL (ref 65–99)
HBA1C MFR BLD: 6.2 % (ref 4.8–5.6)
HCT VFR BLD AUTO: 39.2 % (ref 34–46.6)
HDLC SERPL-MCNC: 57 MG/DL (ref 40–60)
HGB BLD-MCNC: 13.2 G/DL (ref 12–15.9)
IMM GRANULOCYTES # BLD AUTO: 0.02 10*3/MM3 (ref 0–0.05)
IMM GRANULOCYTES NFR BLD AUTO: 0.3 % (ref 0–0.5)
LDLC SERPL CALC-MCNC: 109 MG/DL (ref 0–100)
LYMPHOCYTES # BLD AUTO: 1.79 10*3/MM3 (ref 0.7–3.1)
LYMPHOCYTES NFR BLD AUTO: 30.7 % (ref 19.6–45.3)
MCH RBC QN AUTO: 29.5 PG (ref 26.6–33)
MCHC RBC AUTO-ENTMCNC: 33.7 G/DL (ref 31.5–35.7)
MCV RBC AUTO: 87.7 FL (ref 79–97)
MONOCYTES # BLD AUTO: 0.4 10*3/MM3 (ref 0.1–0.9)
MONOCYTES NFR BLD AUTO: 6.8 % (ref 5–12)
NEUTROPHILS # BLD AUTO: 3.47 10*3/MM3 (ref 1.7–7)
NEUTROPHILS NFR BLD AUTO: 59.5 % (ref 42.7–76)
NRBC BLD AUTO-RTO: 0 /100 WBC (ref 0–0.2)
PLATELET # BLD AUTO: 246 10*3/MM3 (ref 140–450)
POTASSIUM SERPL-SCNC: 4.2 MMOL/L (ref 3.5–5.2)
PROT SERPL-MCNC: 6.3 G/DL (ref 6–8.5)
RBC # BLD AUTO: 4.47 10*6/MM3 (ref 3.77–5.28)
SODIUM SERPL-SCNC: 142 MMOL/L (ref 136–145)
TRIGL SERPL-MCNC: 100 MG/DL (ref 0–150)
TSH SERPL DL<=0.005 MIU/L-ACNC: 1.98 UIU/ML (ref 0.45–4.5)
VLDLC SERPL CALC-MCNC: 20 MG/DL
WBC # BLD AUTO: 5.84 10*3/MM3 (ref 3.4–10.8)

## 2020-08-31 ENCOUNTER — OFFICE VISIT (OUTPATIENT)
Dept: INTERNAL MEDICINE | Facility: CLINIC | Age: 60
End: 2020-08-31

## 2020-08-31 VITALS
HEART RATE: 77 BPM | TEMPERATURE: 96.8 F | OXYGEN SATURATION: 97 % | RESPIRATION RATE: 16 BRPM | DIASTOLIC BLOOD PRESSURE: 82 MMHG | WEIGHT: 183.4 LBS | SYSTOLIC BLOOD PRESSURE: 130 MMHG | BODY MASS INDEX: 29.47 KG/M2 | HEIGHT: 66 IN

## 2020-08-31 DIAGNOSIS — E55.9 VITAMIN D DEFICIENCY: ICD-10-CM

## 2020-08-31 DIAGNOSIS — F32.9 REACTIVE DEPRESSION: ICD-10-CM

## 2020-08-31 DIAGNOSIS — E11.9 TYPE 2 DIABETES MELLITUS WITHOUT COMPLICATION, WITHOUT LONG-TERM CURRENT USE OF INSULIN (HCC): ICD-10-CM

## 2020-08-31 DIAGNOSIS — E78.2 MIXED HYPERLIPIDEMIA: ICD-10-CM

## 2020-08-31 DIAGNOSIS — Z00.00 ENCOUNTER FOR WELLNESS EXAMINATION IN ADULT: Primary | ICD-10-CM

## 2020-08-31 PROBLEM — M25.572 ARTHRALGIA OF LEFT FOOT: Status: RESOLVED | Noted: 2019-08-13 | Resolved: 2020-08-31

## 2020-08-31 PROCEDURE — 99213 OFFICE O/P EST LOW 20 MIN: CPT | Performed by: NURSE PRACTITIONER

## 2020-08-31 PROCEDURE — 99396 PREV VISIT EST AGE 40-64: CPT | Performed by: NURSE PRACTITIONER

## 2020-08-31 RX ORDER — ATORVASTATIN CALCIUM 40 MG/1
40 TABLET, FILM COATED ORAL
Qty: 90 TABLET | Refills: 0 | Status: SHIPPED | OUTPATIENT
Start: 2020-08-31 | End: 2021-06-24 | Stop reason: SDUPTHER

## 2020-08-31 RX ORDER — SERTRALINE HYDROCHLORIDE 100 MG/1
150 TABLET, FILM COATED ORAL DAILY
Qty: 135 TABLET | Refills: 0 | Status: SHIPPED | OUTPATIENT
Start: 2020-08-31 | End: 2020-12-23

## 2020-08-31 NOTE — PROGRESS NOTES
VIOLETA Truong is a 59 y.o. female presenting for Annual Exam; Follow-up; and Diabetes    Her current/chronic health conditions include:  Patient Active Problem List   Diagnosis   • Mixed hyperlipidemia   • Type 2 diabetes mellitus without complication (CMS/HCC)   • Vitamin D deficiency   • Depression     Current Outpatient Medications on File Prior to Visit   Medication Sig   • aspirin 81 MG chewable tablet Chew 81 mg Daily.   • Blood Glucose Monitoring Suppl (ONE TOUCH ULTRA SYSTEM KIT) W/DEVICE kit Check blood sugar 3-4 times per day for first 3 days and then once per day thereafter   • cetirizine (zyrTEC) 10 MG tablet Take 10 mg by mouth Daily.   • Ergocalciferol 400 UNITS tablet Take 2 tablets by mouth Daily.   • glucose blood test strip Use as instructed   • metFORMIN (GLUCOPHAGE) 1000 MG tablet Take 2 tabs in the morning   • ONE TOUCH LANCETS misc Use to check blood sugar 3 times per day for 3-4 days and then once per day   • [DISCONTINUED] atorvastatin (LIPITOR) 40 MG tablet TAKE 1 TABLET BY MOUTH EVERY DAY AT NIGHT   • [DISCONTINUED] Insulin Pen Needle (BD Pen Needle Samara U/F) 32G X 4 MM misc 1 applicator Daily.   • [DISCONTINUED] linagliptin (TRADJENTA) 5 MG tablet tablet Take 1 tab in the morning   • [DISCONTINUED] Liraglutide (VICTOZA) 18 MG/3ML solution pen-injector injection Inject 1.2 mg under the skin into the appropriate area as directed Daily.   • [DISCONTINUED] sertraline (ZOLOFT) 100 MG tablet Take 1.5 tablets by mouth Daily.   • [DISCONTINUED] nitrofurantoin, macrocrystal-monohydrate, (MACROBID) 100 MG capsule Take 1 capsule by mouth 2 (Two) Times a Day.   • [DISCONTINUED] omeprazole (priLOSEC) 40 MG capsule Take 1 capsule by mouth Every Morning Before Breakfast.     No current facility-administered medications on file prior to visit.      HLD - chronic; cannot recall onset; on statin since 2018 and tolerates this well    DM - onset was 2016; current therapy includes metformin, Tradjenta,  "and Victoza; she tolerates this regimen well; she does not check BGs; she received a letter from her ins that they will no longer cover Tradjenta; has taken Janumet in the past and had no issues with this    MDD - onset of recurrence was 2016; current therapy includes Zoloft; obviously 2020 has been very difficult w/ pandemic    Health Habits:  Dental Exam. up to date  Eye Exam. 01/2020 - [facility]  Exercise: 0 times/week.  Current exercise activities include: none    Screenings:  Last pap date: cannot recall  Abnormal pap? none  Mammogram: 2017  Dexa: n/a  Colonoscopy: 2018; repeat in 3 yrs  Tob use: never  Qualifies for lung Ca screening? n/a    Complaints today include: none      The following portions of the patient's history were reviewed and updated as appropriate: allergies, current medications, problem list, past medical history, past family history, past medical history, and past social history.    Review of Systems   Constitutional: Negative.    HENT: Negative.    Eyes: Negative.    Respiratory: Negative.    Cardiovascular: Negative.    Gastrointestinal: Negative.    Endocrine: Negative.    Genitourinary: Negative.    Musculoskeletal: Negative.    Skin: Negative.    Allergic/Immunologic: Negative.    Neurological: Negative.    Hematological: Negative.    Psychiatric/Behavioral: Negative.        OBJECTIVE    /82 (BP Location: Left arm, Patient Position: Sitting, Cuff Size: Adult)   Pulse 77   Temp 96.8 °F (36 °C) (Temporal)   Resp 16   Ht 167.6 cm (65.98\")   Wt 83.2 kg (183 lb 6.4 oz)   LMP  (LMP Unknown)   SpO2 97%   BMI 29.62 kg/m²   Body mass index is 29.62 kg/m².  Nursing notes and vital signs reviewed.    Physical Exam   Constitutional: She is oriented to person, place, and time. She appears well-developed and well-nourished. No distress.   HENT:   Head: Normocephalic.   Right Ear: Hearing, tympanic membrane, external ear and ear canal normal.   Left Ear: Hearing, tympanic membrane, external " ear and ear canal normal.   Nose: Nose normal. No mucosal edema or rhinorrhea.   Mouth/Throat: Uvula is midline, oropharynx is clear and moist and mucous membranes are normal. No tonsillar exudate.   Eyes: Pupils are equal, round, and reactive to light. Conjunctivae, EOM and lids are normal.   Neck: Normal range of motion. Neck supple. No thyroid mass and no thyromegaly present.   Cardiovascular: Regular rhythm, S1 normal and S2 normal. Exam reveals no gallop and no friction rub.   No murmur heard.  Pulmonary/Chest: Effort normal and breath sounds normal. She has no wheezes. She has no rhonchi. She has no rales.   Abdominal: Soft. Bowel sounds are normal. There is no hepatomegaly. There is no tenderness. There is no guarding. No hernia.   Musculoskeletal: Normal range of motion. She exhibits no edema or deformity.    Dionne had a diabetic foot exam performed today.    Neurological Sensory Findings -  Unaltered sharp/dull right ankle/foot discrimination and unaltered sharp/dull left ankle/foot discrimination.  Vascular Status -  Her right foot exhibits normal foot vasculature  and no edema. Her left foot exhibits normal foot vasculature  and no edema.  Skin Integrity  -  Her right foot skin is intact.  She has no right foot onychomycosis.Her left foot skin is intact. She has no left foot onychomycosis..  Lymphadenopathy:     She has no cervical adenopathy.   Neurological: She is alert and oriented to person, place, and time. She has normal strength and normal reflexes. No cranial nerve deficit or sensory deficit.   Skin: Skin is warm, dry and intact. No lesion and no rash noted.   Psychiatric: She has a normal mood and affect. Her speech is normal and behavior is normal. Thought content normal. She is attentive.       Recent Results (from the past 672 hour(s))   CBC & Differential    Collection Time: 08/25/20  8:21 AM   Result Value Ref Range    WBC 5.84 3.40 - 10.80 10*3/mm3    RBC 4.47 3.77 - 5.28 10*6/mm3     Hemoglobin 13.2 12.0 - 15.9 g/dL    Hematocrit 39.2 34.0 - 46.6 %    MCV 87.7 79.0 - 97.0 fL    MCH 29.5 26.6 - 33.0 pg    MCHC 33.7 31.5 - 35.7 g/dL    RDW 13.6 12.3 - 15.4 %    Platelets 246 140 - 450 10*3/mm3    Neutrophil Rel % 59.5 42.7 - 76.0 %    Lymphocyte Rel % 30.7 19.6 - 45.3 %    Monocyte Rel % 6.8 5.0 - 12.0 %    Eosinophil Rel % 2.2 0.3 - 6.2 %    Basophil Rel % 0.5 0.0 - 1.5 %    Neutrophils Absolute 3.47 1.70 - 7.00 10*3/mm3    Lymphocytes Absolute 1.79 0.70 - 3.10 10*3/mm3    Monocytes Absolute 0.40 0.10 - 0.90 10*3/mm3    Eosinophils Absolute 0.13 0.00 - 0.40 10*3/mm3    Basophils Absolute 0.03 0.00 - 0.20 10*3/mm3    Immature Granulocyte Rel % 0.3 0.0 - 0.5 %    Immature Grans Absolute 0.02 0.00 - 0.05 10*3/mm3    nRBC 0.0 0.0 - 0.2 /100 WBC   Comprehensive Metabolic Panel    Collection Time: 08/25/20  8:21 AM   Result Value Ref Range    Glucose 113 (H) 65 - 99 mg/dL    BUN 12 6 - 20 mg/dL    Creatinine 0.97 0.57 - 1.00 mg/dL    eGFR Non African Am 59 (L) >60 mL/min/1.73    eGFR African Am 71 >60 mL/min/1.73    BUN/Creatinine Ratio 12.4 7.0 - 25.0    Sodium 142 136 - 145 mmol/L    Potassium 4.2 3.5 - 5.2 mmol/L    Chloride 103 98 - 107 mmol/L    Total CO2 25.2 22.0 - 29.0 mmol/L    Calcium 9.3 8.6 - 10.5 mg/dL    Total Protein 6.3 6.0 - 8.5 g/dL    Albumin 4.70 3.50 - 5.20 g/dL    Globulin 1.6 gm/dL    A/G Ratio 2.9 g/dL    Total Bilirubin 0.2 0.0 - 1.2 mg/dL    Alkaline Phosphatase 79 39 - 117 U/L    AST (SGOT) 17 1 - 32 U/L    ALT (SGPT) 12 1 - 33 U/L   Hemoglobin A1c    Collection Time: 08/25/20  8:21 AM   Result Value Ref Range    Hemoglobin A1C 6.20 (H) 4.80 - 5.60 %   Lipid Panel With / Chol / HDL Ratio    Collection Time: 08/25/20  8:21 AM   Result Value Ref Range    Total Cholesterol 186 0 - 200 mg/dL    Triglycerides 100 0 - 150 mg/dL    HDL Cholesterol 57 40 - 60 mg/dL    VLDL Cholesterol 20 mg/dL    LDL Cholesterol  109 (H) 0 - 100 mg/dL    Chol/HDL Ratio 3.26    Thyroid Cascade Profile     Collection Time: 08/25/20  8:21 AM   Result Value Ref Range    TSH 1.980 0.450 - 4.500 uIU/mL   Vitamin D 25 Hydroxy    Collection Time: 08/25/20  8:21 AM   Result Value Ref Range    25 Hydroxy, Vitamin D 38.7 30.0 - 100.0 ng/ml     Each of these lab results were discussed individually in detail with the patient.      ASSESSMENT AND PLAN    Dionne was seen today for annual exam, follow-up and diabetes.    Diagnoses and all orders for this visit:    Encounter for wellness examination in adult  -     Hepatitis C Antibody; Future    Mixed hyperlipidemia  Comments:  - well controlled  Orders:  -     atorvastatin (LIPITOR) 40 MG tablet; Take 1 tablet by mouth every night at bedtime.  -     Comprehensive Metabolic Panel; Future  -     Lipid Panel With / Chol / HDL Ratio; Future    Type 2 diabetes mellitus without complication, without long-term current use of insulin (CMS/[facility])  Comments:  - well controlled  Orders:  -     Insulin Pen Needle (BD Pen Needle Samara U/F) 32G X 4 MM misc; 1 each Daily.  -     Liraglutide (VICTOZA) 18 MG/3ML solution pen-injector injection; Inject 1.2 mg under the skin into the appropriate area as directed Daily.  -     Comprehensive Metabolic Panel; Future  -     Hemoglobin A1c; Future  -     Microalbumin / Creatinine Urine Ratio - Urine, Clean Catch; Future    Reactive depression  Comments:  - stable  Orders:  -     sertraline (ZOLOFT) 100 MG tablet; Take 1.5 tablets by mouth Daily.    Vitamin D deficiency  Comments:  - sufficient w/ most recent labs        Preventative counseling completed including relevant screenings, appropriate vaccinations, healthy nutrition, and appropriate physical activity.  Advised to get Shingles vaccine at pharmacy.  Advised to schedule PAP and Mammo w/ GYN.     There is some question as to what diabetes therapies her ins will cover. I have asked her to send me the letter from her insurance.  There is also some question as to what formulation of Metformin she has  been taking. The patient is under the impression she has been taking ER Metformin. However, Epic notes this as IR. When I s/w [facility], they have been dispensing IR.        Medications, including side effects, were discussed with the patient. Patient verbalized understanding.  The plan of care was discussed. All questions were answered. Patient verbalized understanding.        Return in about 6 months (around 2/28/2021)., or sooner if needed.

## 2020-09-05 ENCOUNTER — RESULTS ENCOUNTER (OUTPATIENT)
Dept: INTERNAL MEDICINE | Facility: CLINIC | Age: 60
End: 2020-09-05

## 2020-09-05 DIAGNOSIS — Z00.00 ENCOUNTER FOR WELLNESS EXAMINATION IN ADULT: ICD-10-CM

## 2020-09-05 DIAGNOSIS — E78.2 MIXED HYPERLIPIDEMIA: ICD-10-CM

## 2020-09-05 DIAGNOSIS — E11.9 TYPE 2 DIABETES MELLITUS WITHOUT COMPLICATION, WITHOUT LONG-TERM CURRENT USE OF INSULIN (HCC): ICD-10-CM

## 2020-09-10 DIAGNOSIS — E11.9 TYPE 2 DIABETES MELLITUS WITHOUT COMPLICATION, WITHOUT LONG-TERM CURRENT USE OF INSULIN (HCC): Primary | ICD-10-CM

## 2020-12-23 DIAGNOSIS — F32.9 REACTIVE DEPRESSION: ICD-10-CM

## 2020-12-23 RX ORDER — SERTRALINE HYDROCHLORIDE 100 MG/1
TABLET, FILM COATED ORAL
Qty: 135 TABLET | Refills: 0 | Status: SHIPPED | OUTPATIENT
Start: 2020-12-23 | End: 2021-06-28 | Stop reason: SDUPTHER

## 2021-01-23 DIAGNOSIS — E11.9 TYPE 2 DIABETES MELLITUS WITHOUT COMPLICATION, WITHOUT LONG-TERM CURRENT USE OF INSULIN (HCC): ICD-10-CM

## 2021-01-25 RX ORDER — LIRAGLUTIDE 6 MG/ML
INJECTION SUBCUTANEOUS
Qty: 6 PEN | Refills: 5 | Status: SHIPPED | OUTPATIENT
Start: 2021-01-25 | End: 2021-06-18

## 2021-04-07 ENCOUNTER — TRANSCRIBE ORDERS (OUTPATIENT)
Dept: ADMINISTRATIVE | Facility: HOSPITAL | Age: 61
End: 2021-04-07

## 2021-04-07 DIAGNOSIS — Z12.31 VISIT FOR SCREENING MAMMOGRAM: Primary | ICD-10-CM

## 2021-05-25 ENCOUNTER — APPOINTMENT (OUTPATIENT)
Dept: MAMMOGRAPHY | Facility: HOSPITAL | Age: 61
End: 2021-05-25

## 2021-06-02 ENCOUNTER — TELEPHONE (OUTPATIENT)
Dept: INTERNAL MEDICINE | Facility: CLINIC | Age: 61
End: 2021-06-02

## 2021-06-02 NOTE — TELEPHONE ENCOUNTER
Caller: Dionne Bond    Relationship: Self    Best call back number: [phone]    What orders are you requesting (i.e. lab or imaging): LABS     In what timeframe would the patient need to come in: BEFORE 6/18 FOR HER 6 MONTH F/U

## 2021-06-04 ENCOUNTER — HOSPITAL ENCOUNTER (OUTPATIENT)
Dept: MAMMOGRAPHY | Facility: HOSPITAL | Age: 61
Discharge: HOME | End: 2021-06-04
Admitting: NURSE PRACTITIONER

## 2021-06-04 DIAGNOSIS — Z12.31 VISIT FOR SCREENING MAMMOGRAM: ICD-10-CM

## 2021-06-04 PROCEDURE — 77063 BREAST TOMOSYNTHESIS BI: CPT

## 2021-06-04 PROCEDURE — 77067 SCR MAMMO BI INCL CAD: CPT

## 2021-06-07 ENCOUNTER — OFFICE VISIT (OUTPATIENT)
Dept: INTERNAL MEDICINE | Facility: CLINIC | Age: 61
End: 2021-06-07

## 2021-06-07 VITALS
OXYGEN SATURATION: 99 % | HEART RATE: 77 BPM | TEMPERATURE: 97.8 F | BODY MASS INDEX: 43.24 KG/M2 | HEIGHT: 56 IN | WEIGHT: 192.2 LBS | DIASTOLIC BLOOD PRESSURE: 78 MMHG | SYSTOLIC BLOOD PRESSURE: 138 MMHG | RESPIRATION RATE: 18 BRPM

## 2021-06-07 DIAGNOSIS — L23.7 ALLERGIC CONTACT DERMATITIS DUE TO PLANTS, EXCEPT FOOD: Primary | ICD-10-CM

## 2021-06-07 PROCEDURE — 99213 OFFICE O/P EST LOW 20 MIN: CPT | Performed by: NURSE PRACTITIONER

## 2021-06-07 RX ORDER — PREDNISONE 10 MG/1
TABLET ORAL
Qty: 42 TABLET | Refills: 0 | Status: SHIPPED | OUTPATIENT
Start: 2021-06-07 | End: 2021-11-02

## 2021-06-07 RX ORDER — BETAMETHASONE DIPROPIONATE 0.5 MG/G
OINTMENT TOPICAL 2 TIMES DAILY
Qty: 45 G | Refills: 0 | Status: SHIPPED | OUTPATIENT
Start: 2021-06-07 | End: 2021-06-28

## 2021-06-07 NOTE — PROGRESS NOTES
"Dionne Bond is a 60 y.o. female presenting today for   Chief Complaint   Patient presents with   • Poison Ivy     for 2 weeks       Subjective    Poison Ivy  This is a new problem. Episode onset: approx 2 weeks ago. The problem has been gradually worsening since onset. The affected locations include the left arm, abdomen and right arm. The rash is characterized by itchiness. She was exposed to plant contact. Pertinent negatives include no fever or shortness of breath. Treatments tried: hydrocortisone, gold bond, benadryl cream, blue star oint, witch hazel. The treatment provided no relief.        The following portions of the patient's history were reviewed and updated as appropriate: allergies, current medications, problem list, past medical history, past surgical history, family history, and social history.    Review of Systems   Constitutional: Negative for fever.   HENT: Negative for trouble swallowing.    Respiratory: Negative for shortness of breath.    Skin: Positive for rash.         Objective    Vitals:    06/07/21 1104   BP: 138/78   Pulse: 77   Resp: 18   Temp: 97.8 °F (36.6 °C)   TempSrc: Infrared   SpO2: 99%   Weight: 87.2 kg (192 lb 3.2 oz)   Height: 142.2 cm (56\")     Body mass index is 43.09 kg/m².  Nursing notes and vitals reviewed.    Physical Exam  Constitutional:       General: She is not in acute distress.     Appearance: She is well-developed.   Neck:      Thyroid: No thyroid mass or thyromegaly.   Cardiovascular:      Rate and Rhythm: Regular rhythm.      Heart sounds: S1 normal and S2 normal.   Pulmonary:      Effort: Pulmonary effort is normal.      Breath sounds: Normal breath sounds.   Musculoskeletal:      Cervical back: Neck supple.   Lymphadenopathy:      Cervical: No cervical adenopathy.   Skin:     General: Skin is warm and dry.      Findings: Rash present.      Comments: Scattered clusters of erythematous vesico-papular patches   Neurological:      Mental Status: She is alert and " oriented to person, place, and time.   Psychiatric:         Attention and Perception: She is attentive.         Behavior: Behavior normal.         Thought Content: Thought content normal.           Assessment and Plan    Diagnoses and all orders for this visit:    1. Allergic contact dermatitis due to plants, except food (Primary)  -     predniSONE (DELTASONE) 10 MG tablet; 6 tabs PO QD x 2days, then 5 tabs QD x2days, then 4 tabs QD x2days, then 3 tabs QD x2days, then 2 tabs QD x2 days, then 1 tab QD x2days  Dispense: 42 tablet; Refill: 0  -     betamethasone, augmented, (DIPROLENE) 0.05 % ointment; Apply  topically to the appropriate area as directed 2 (Two) Times a Day for 21 days.  Dispense: 45 g; Refill: 0            Medications, including side effects, were discussed with the patient. Patient verbalized understanding.  The plan of care was discussed. All questions were answered. Patient verbalized understanding.        Return if symptoms worsen or fail to improve.

## 2021-06-09 ENCOUNTER — OFFICE VISIT (OUTPATIENT)
Dept: OBSTETRICS AND GYNECOLOGY | Facility: CLINIC | Age: 61
End: 2021-06-09

## 2021-06-09 VITALS
HEIGHT: 66 IN | SYSTOLIC BLOOD PRESSURE: 142 MMHG | BODY MASS INDEX: 30.86 KG/M2 | WEIGHT: 192 LBS | DIASTOLIC BLOOD PRESSURE: 86 MMHG

## 2021-06-09 DIAGNOSIS — Z11.51 SPECIAL SCREENING EXAMINATION FOR HUMAN PAPILLOMAVIRUS (HPV): ICD-10-CM

## 2021-06-09 DIAGNOSIS — Z01.419 ROUTINE GYNECOLOGICAL EXAMINATION: Primary | ICD-10-CM

## 2021-06-09 DIAGNOSIS — Z01.419 PAP SMEAR, LOW-RISK: ICD-10-CM

## 2021-06-09 LAB
BILIRUB BLD-MCNC: NEGATIVE MG/DL
CLARITY, POC: CLEAR
COLOR UR: YELLOW
GLUCOSE UR STRIP-MCNC: NEGATIVE MG/DL
KETONES UR QL: NEGATIVE
LEUKOCYTE EST, POC: NEGATIVE
NITRITE UR-MCNC: NEGATIVE MG/ML
PH UR: 6 [PH] (ref 5–8)
PROT UR STRIP-MCNC: NEGATIVE MG/DL
RBC # UR STRIP: NEGATIVE /UL
SP GR UR: 1.03 (ref 1–1.03)
UROBILINOGEN UR QL: NORMAL

## 2021-06-09 PROCEDURE — 81002 URINALYSIS NONAUTO W/O SCOPE: CPT | Performed by: OBSTETRICS & GYNECOLOGY

## 2021-06-09 PROCEDURE — 99386 PREV VISIT NEW AGE 40-64: CPT | Performed by: OBSTETRICS & GYNECOLOGY

## 2021-06-14 LAB
CYTOLOGIST CVX/VAG CYTO: NORMAL
CYTOLOGY CVX/VAG DOC CYTO: NORMAL
CYTOLOGY CVX/VAG DOC THIN PREP: NORMAL
DX ICD CODE: NORMAL
HIV 1 & 2 AB SER-IMP: NORMAL
HPV I/H RISK 4 DNA CVX QL PROBE+SIG AMP: NEGATIVE
Lab: NORMAL
OTHER STN SPEC: NORMAL
STAT OF ADQ CVX/VAG CYTO-IMP: NORMAL

## 2021-06-16 LAB
ALBUMIN SERPL-MCNC: 5 G/DL (ref 3.5–5.2)
ALBUMIN/CREAT UR: 3 MG/G CREAT (ref 0–29)
ALBUMIN/GLOB SERPL: 2.1 G/DL
ALP SERPL-CCNC: 98 U/L (ref 39–117)
ALT SERPL-CCNC: 16 U/L (ref 1–33)
AST SERPL-CCNC: 10 U/L (ref 1–32)
BILIRUB SERPL-MCNC: 0.2 MG/DL (ref 0–1.2)
BUN SERPL-MCNC: 18 MG/DL (ref 6–20)
BUN/CREAT SERPL: 19.1 (ref 7–25)
CALCIUM SERPL-MCNC: 10 MG/DL (ref 8.6–10.5)
CHLORIDE SERPL-SCNC: 98 MMOL/L (ref 98–107)
CHOLEST SERPL-MCNC: 232 MG/DL (ref 0–200)
CHOLEST/HDLC SERPL: 2.73 {RATIO}
CO2 SERPL-SCNC: 29.7 MMOL/L (ref 22–29)
CREAT SERPL-MCNC: 0.94 MG/DL (ref 0.57–1)
CREAT UR-MCNC: 140.4 MG/DL
GLOBULIN SER CALC-MCNC: 2.4 GM/DL
GLUCOSE SERPL-MCNC: 168 MG/DL (ref 65–99)
HBA1C MFR BLD: 7.9 % (ref 4.8–5.6)
HCV AB S/CO SERPL IA: <0.1 S/CO RATIO (ref 0–0.9)
HDLC SERPL-MCNC: 85 MG/DL (ref 40–60)
LDLC SERPL CALC-MCNC: 115 MG/DL (ref 0–100)
MICROALBUMIN UR-MCNC: 3.9 UG/ML
POTASSIUM SERPL-SCNC: 4.1 MMOL/L (ref 3.5–5.2)
PROT SERPL-MCNC: 7.4 G/DL (ref 6–8.5)
SODIUM SERPL-SCNC: 144 MMOL/L (ref 136–145)
TRIGL SERPL-MCNC: 187 MG/DL (ref 0–150)
VLDLC SERPL CALC-MCNC: 32 MG/DL (ref 5–40)

## 2021-06-18 ENCOUNTER — OFFICE VISIT (OUTPATIENT)
Dept: INTERNAL MEDICINE | Facility: CLINIC | Age: 61
End: 2021-06-18

## 2021-06-18 VITALS
HEART RATE: 76 BPM | OXYGEN SATURATION: 98 % | TEMPERATURE: 96.6 F | BODY MASS INDEX: 30.7 KG/M2 | SYSTOLIC BLOOD PRESSURE: 124 MMHG | RESPIRATION RATE: 18 BRPM | WEIGHT: 191 LBS | HEIGHT: 66 IN | DIASTOLIC BLOOD PRESSURE: 78 MMHG

## 2021-06-18 DIAGNOSIS — E11.9 TYPE 2 DIABETES MELLITUS WITHOUT COMPLICATION, WITHOUT LONG-TERM CURRENT USE OF INSULIN (HCC): Primary | ICD-10-CM

## 2021-06-18 DIAGNOSIS — F32.9 REACTIVE DEPRESSION: ICD-10-CM

## 2021-06-18 DIAGNOSIS — E78.2 MIXED HYPERLIPIDEMIA: ICD-10-CM

## 2021-06-18 DIAGNOSIS — E55.9 VITAMIN D DEFICIENCY: ICD-10-CM

## 2021-06-18 PROCEDURE — 99214 OFFICE O/P EST MOD 30 MIN: CPT | Performed by: NURSE PRACTITIONER

## 2021-06-18 RX ORDER — BLOOD-GLUCOSE METER
1 KIT MISCELLANEOUS DAILY
Qty: 1 EACH | Refills: 0 | Status: SHIPPED | OUTPATIENT
Start: 2021-06-18

## 2021-06-18 RX ORDER — LIRAGLUTIDE 6 MG/ML
1.8 INJECTION SUBCUTANEOUS DAILY
Qty: 3 PEN | Refills: 5 | Status: SHIPPED | OUTPATIENT
Start: 2021-06-18 | End: 2021-09-17 | Stop reason: SDUPTHER

## 2021-06-18 NOTE — PROGRESS NOTES
Subjective   Dionne Bond is a 60 y.o. female presenting today for follow up of   Chief Complaint   Patient presents with   • Hyperlipidemia   • Depression   • Diabetes       History of Present Illness     Patient Active Problem List   Diagnosis   • Mixed hyperlipidemia   • Type 2 diabetes mellitus without complication (CMS/[facility])   • Vitamin D deficiency   • Depression       Current Outpatient Medications on File Prior to Visit   Medication Sig   • aspirin 81 MG chewable tablet Chew 81 mg Daily.   • atorvastatin (LIPITOR) 40 MG tablet Take 1 tablet by mouth every night at bedtime.   • cetirizine (zyrTEC) 10 MG tablet Take 10 mg by mouth Daily.   • Ergocalciferol 400 UNITS tablet Take 2 tablets by mouth Daily.   • Insulin Pen Needle (BD Pen Needle Samara U/F) 32G X 4 MM misc 1 each Daily.   • metFORMIN (Glucophage) 1000 MG tablet Take 1 tablet by mouth 2 (Two) Times a Day With Meals.   • ONE TOUCH LANCETS misc Use to check blood sugar 3 times per day for 3-4 days and then once per day   • predniSONE (DELTASONE) 10 MG tablet 6 tabs PO QD x 2days, then 5 tabs QD x2days, then 4 tabs QD x2days, then 3 tabs QD x2days, then 2 tabs QD x2 days, then 1 tab QD x2days   • sertraline (ZOLOFT) 100 MG tablet take 1 and 1/2 tablets by mouth once daily   • SITagliptin (Januvia) 100 MG tablet Take 1 tablet by mouth Daily.   • [DISCONTINUED] Blood Glucose Monitoring Suppl (ONE TOUCH ULTRA SYSTEM KIT) W/DEVICE kit Check blood sugar 3-4 times per day for first 3 days and then once per day thereafter   • [DISCONTINUED] glucose blood test strip Use as instructed   • [DISCONTINUED] Victoza 18 MG/3ML solution pen-injector injection INJECT 1.2 MG UNDER THE SKIN INTO THE APPROPRIATE AREA AS DIRECTED DAILY.   • betamethasone, augmented, (DIPROLENE) 0.05 % ointment Apply  topically to the appropriate area as directed 2 (Two) Times a Day for 21 days.   • triamcinolone (KENALOG) 0.1 % ointment Apply  topically to the appropriate area as directed 2  "(Two) Times a Day.     No current facility-administered medications on file prior to visit.        HLD - chronic; cannot recall onset; on statin since 2018 and tolerates this well     DM - onset was 2016; current therapy includes metformin, Tradjenta, and Victoza; she tolerates this regimen well; she does not check BGs; she received a letter from her ins that they will no longer cover Tradjenta; has taken Janumet in the past and had no issues with this     MDD - onset of recurrence was 2016; current therapy includes Zoloft; she is doing very well    The following portions of the patient's history were reviewed and updated as appropriate: allergies, current medications, past family history, past medical history, past social history, past surgical history and problem list.    Review of Systems   All other systems reviewed and are negative.      Objective   Vitals:    06/18/21 1318   BP: 124/78   Pulse: 76   Resp: 18   Temp: 96.6 °F (35.9 °C)   TempSrc: Temporal   SpO2: 98%   Weight: 86.6 kg (191 lb)   Height: 167.6 cm (65.98\")       BP Readings from Last 3 Encounters:   06/18/21 124/78   06/09/21 142/86   06/07/21 138/78        Wt Readings from Last 3 Encounters:   06/18/21 86.6 kg (191 lb)   06/09/21 87.1 kg (192 lb)   06/07/21 87.2 kg (192 lb 3.2 oz)        Body mass index is 30.84 kg/m².  Nursing notes and vitals reviewed.    Physical Exam  Constitutional:       General: She is not in acute distress.     Appearance: She is well-developed.   Cardiovascular:      Rate and Rhythm: Regular rhythm.      Heart sounds: S1 normal and S2 normal.   Pulmonary:      Effort: Pulmonary effort is normal.      Breath sounds: Normal breath sounds.   Neurological:      Mental Status: She is alert and oriented to person, place, and time.   Psychiatric:         Attention and Perception: She is attentive.         Behavior: Behavior normal.         Thought Content: Thought content normal.         Recent Results (from the past 672 " hour(s))   IgP, Aptima HPV    Collection Time: 06/09/21  4:29 PM    Specimen: Cervix; ThinPrep Vial   Result Value Ref Range    Diagnosis Comment     Specimen adequacy: Comment     Clinician Provided ICD-10: Comment     Performed by: Comment     QC reviewed by: Comment     . .     Note: Comment     Method: Comment     HPV Aptima Negative Negative   POC Urinalysis Dipstick    Collection Time: 06/09/21  4:39 PM    Specimen: Urine   Result Value Ref Range    Color Yellow Yellow, Straw, Dark Yellow, Kianna    Clarity, UA Clear Clear    Glucose, UA Negative Negative, 1000 mg/dL (3+) mg/dL    Bilirubin Negative Negative    Ketones, UA Negative Negative    Specific Gravity  1.030 1.005 - 1.030    Blood, UA Negative Negative    pH, Urine 6.0 5.0 - 8.0    Protein, POC Negative Negative mg/dL    Urobilinogen, UA Normal Normal    Leukocytes Negative Negative    Nitrite, UA Negative Negative   Comprehensive Metabolic Panel    Collection Time: 06/15/21  8:27 AM    Specimen: Blood   Result Value Ref Range    Glucose 168 (H) 65 - 99 mg/dL    BUN 18 6 - 20 mg/dL    Creatinine 0.94 0.57 - 1.00 mg/dL    eGFR Non African Am 61 >60 mL/min/1.73    eGFR African Am 74 >60 mL/min/1.73    BUN/Creatinine Ratio 19.1 7.0 - 25.0    Sodium 144 136 - 145 mmol/L    Potassium 4.1 3.5 - 5.2 mmol/L    Chloride 98 98 - 107 mmol/L    Total CO2 29.7 (H) 22.0 - 29.0 mmol/L    Calcium 10.0 8.6 - 10.5 mg/dL    Total Protein 7.4 6.0 - 8.5 g/dL    Albumin 5.00 3.50 - 5.20 g/dL    Globulin 2.4 gm/dL    A/G Ratio 2.1 g/dL    Total Bilirubin 0.2 0.0 - 1.2 mg/dL    Alkaline Phosphatase 98 39 - 117 U/L    AST (SGOT) 10 1 - 32 U/L    ALT (SGPT) 16 1 - 33 U/L   Hemoglobin A1c    Collection Time: 06/15/21  8:27 AM    Specimen: Blood   Result Value Ref Range    Hemoglobin A1C 7.90 (H) 4.80 - 5.60 %   Hepatitis C Antibody    Collection Time: 06/15/21  8:27 AM    Specimen: Blood   Result Value Ref Range    Hep C Virus Ab <0.1 0.0 - 0.9 s/co ratio   Lipid Panel With /  Chol / HDL Ratio    Collection Time: 06/15/21  8:27 AM    Specimen: Blood   Result Value Ref Range    Total Cholesterol 232 (H) 0 - 200 mg/dL    Triglycerides 187 (H) 0 - 150 mg/dL    HDL Cholesterol 85 (H) 40 - 60 mg/dL    VLDL Cholesterol Rashid 32 5 - 40 mg/dL    LDL Chol Calc (NIH) 115 (H) 0 - 100 mg/dL    Chol/HDL Ratio 2.73    Microalbumin / Creatinine Urine Ratio - Urine, Clean Catch    Collection Time: 06/15/21  8:27 AM    Specimen: Urine, Clean Catch   Result Value Ref Range    Creatinine, Urine 140.4 Not Estab. mg/dL    Microalbumin, Urine 3.9 Not Estab. ug/mL    Microalbumin/Creatinine Ratio 3 0 - 29 mg/g creat         Assessment/Plan   Diagnoses and all orders for this visit:    1. Type 2 diabetes mellitus without complication, without long-term current use of insulin (CMS/[facility]) (Primary)  Comments:  - uncontrolled  - increase Victoza to 1.8mcg  - focus on TLCs  Orders:  -     Liraglutide (Victoza) 18 MG/3ML solution pen-injector injection; Inject 1.8 mg under the skin into the appropriate area as directed Daily.  Dispense: 3 pen; Refill: 5  -     glucose monitor monitoring kit; 1 each Daily. Please dispense the glucometer preferred by the patient's insurance formulary.  Dispense: 1 each; Refill: 0  -     glucose blood test strip; 1 each by Other route Daily. Please dispense the test strips required w/ the patient's glucometer.  Dispense: 100 each; Refill: 3    2. Mixed hyperlipidemia  Comments:  - uncontrolled  - focus on TLCs    3. Reactive depression  Comments:  - controlled        Patient counseled regarding therapeutic lifestyle changes including improved nutrition, increased exercise, and weight loss.    Except as noted above, pt will continue current medications as noted in the medication list. I will continue to authorize refills as needed.        Medications, including side effects, were discussed with the patient. Patient verbalized understanding.  The plan of care was discussed. All questions were  answered. Patient verbalized understanding.      Return in about 4 months (around 10/18/2021) for ; w/ fasting labs one week prior to CPE.

## 2021-06-24 DIAGNOSIS — E78.2 MIXED HYPERLIPIDEMIA: ICD-10-CM

## 2021-06-24 DIAGNOSIS — E11.9 TYPE 2 DIABETES MELLITUS WITHOUT COMPLICATION, WITHOUT LONG-TERM CURRENT USE OF INSULIN (HCC): ICD-10-CM

## 2021-06-24 DIAGNOSIS — F32.9 REACTIVE DEPRESSION: ICD-10-CM

## 2021-06-24 RX ORDER — ATORVASTATIN CALCIUM 40 MG/1
40 TABLET, FILM COATED ORAL
Qty: 90 TABLET | Refills: 0 | OUTPATIENT
Start: 2021-06-24

## 2021-06-24 RX ORDER — PEN NEEDLE, DIABETIC 32GX 5/32"
1 NEEDLE, DISPOSABLE MISCELLANEOUS DAILY
Qty: 100 EACH | Refills: 0 | OUTPATIENT
Start: 2021-06-24

## 2021-06-24 RX ORDER — SERTRALINE HYDROCHLORIDE 100 MG/1
150 TABLET, FILM COATED ORAL DAILY
Qty: 135 TABLET | Refills: 0 | OUTPATIENT
Start: 2021-06-24

## 2021-06-24 NOTE — TELEPHONE ENCOUNTER
Caller: Dionne Bond    Relationship: Self    Best call back number: [phone]    Medication needed:   Requested Prescriptions     Pending Prescriptions Disp Refills   • SITagliptin (Januvia) 100 MG tablet 90 tablet 2     Sig: Take 1 tablet by mouth Daily.   • metFORMIN (Glucophage) 1000 MG tablet 180 tablet 2     Sig: Take 1 tablet by mouth 2 (Two) Times a Day With Meals.   • Insulin Pen Needle (BD Pen Needle Samara U/F) 32G X 4 MM misc 100 each 0     Si each Daily.   • atorvastatin (LIPITOR) 40 MG tablet 90 tablet 0     Sig: Take 1 tablet by mouth every night at bedtime.   • sertraline (ZOLOFT) 100 MG tablet 135 tablet 0     Sig: Take 1.5 tablets by mouth Daily.       When do you need the refill by: TODAY    What additional details did the patient provide when requesting the medication: PATIENT OUT OF MEDICATION.    Does the patient have less than a 3 day supply:  [x] Yes  [] No    What is the patient's preferred pharmacy: [id number] [address] [phone] [facility] [phone]

## 2021-06-25 RX ORDER — ATORVASTATIN CALCIUM 40 MG/1
40 TABLET, FILM COATED ORAL
Qty: 90 TABLET | Refills: 0 | Status: SHIPPED | OUTPATIENT
Start: 2021-06-25 | End: 2021-06-28 | Stop reason: SDUPTHER

## 2021-06-25 RX ORDER — PEN NEEDLE, DIABETIC 32GX 5/32"
1 NEEDLE, DISPOSABLE MISCELLANEOUS DAILY
Qty: 100 EACH | Refills: 0 | Status: SHIPPED | OUTPATIENT
Start: 2021-06-25 | End: 2021-06-28 | Stop reason: SDUPTHER

## 2021-06-28 DIAGNOSIS — E78.2 MIXED HYPERLIPIDEMIA: ICD-10-CM

## 2021-06-28 DIAGNOSIS — E11.9 TYPE 2 DIABETES MELLITUS WITHOUT COMPLICATION, WITHOUT LONG-TERM CURRENT USE OF INSULIN (HCC): ICD-10-CM

## 2021-06-28 DIAGNOSIS — F32.9 REACTIVE DEPRESSION: ICD-10-CM

## 2021-06-28 NOTE — TELEPHONE ENCOUNTER
Caller: Dionne Bond    Relationship: Self    Best call back number: [phone]    Medication needed:   Requested Prescriptions     Pending Prescriptions Disp Refills   • SITagliptin (Januvia) 100 MG tablet 90 tablet 2     Sig: Take 1 tablet by mouth Daily.   • atorvastatin (LIPITOR) 40 MG tablet 90 tablet 0     Sig: Take 1 tablet by mouth every night at bedtime.   • sertraline (ZOLOFT) 100 MG tablet 135 tablet 0     Sig: Take 1.5 tablets by mouth Daily.   • metFORMIN (Glucophage) 1000 MG tablet 180 tablet 2     Sig: Take 1 tablet by mouth 2 (Two) Times a Day With Meals.   • Insulin Pen Needle (BD Pen Needle Samara U/F) 32G X 4 MM misc 100 each 0     Si each Daily.       When do you need the refill by: ASAP    What additional details did the patient provide when requesting the medication: OUT OF MEDICATION    Does the patient have less than a 3 day supply:  [x] Yes  [] No    What is the patient's preferred pharmacy: [id number] - [facility] [address] [phone] [facility] [phone]

## 2021-06-29 DIAGNOSIS — E11.9 TYPE 2 DIABETES MELLITUS WITHOUT COMPLICATION, WITHOUT LONG-TERM CURRENT USE OF INSULIN (HCC): ICD-10-CM

## 2021-06-29 RX ORDER — SERTRALINE HYDROCHLORIDE 100 MG/1
150 TABLET, FILM COATED ORAL DAILY
Qty: 45 TABLET | Refills: 0 | Status: SHIPPED | OUTPATIENT
Start: 2021-06-29 | End: 2021-11-02

## 2021-06-29 RX ORDER — PEN NEEDLE, DIABETIC 32GX 5/32"
1 NEEDLE, DISPOSABLE MISCELLANEOUS DAILY
Qty: 100 EACH | Refills: 0 | Status: SHIPPED | OUTPATIENT
Start: 2021-06-29 | End: 2022-05-02 | Stop reason: SDUPTHER

## 2021-06-29 RX ORDER — ATORVASTATIN CALCIUM 40 MG/1
40 TABLET, FILM COATED ORAL
Qty: 30 TABLET | Refills: 0 | Status: SHIPPED | OUTPATIENT
Start: 2021-06-29 | End: 2021-07-29

## 2021-07-09 NOTE — TELEPHONE ENCOUNTER
----- Message from Nicky Bray MA sent at 5/3/2018  4:18 PM EDT -----  Regarding: FATS TRACK  PLEASE SCHEDULE  ----- Message -----  From: Annette Trinidad MD  Sent: 5/3/2018   3:12 PM  To: Nicky Bray MA    Reviewed–okay to schedule        ----- Message -----  From: Nicky Bray MA  Sent: 5/3/2018  11:09 AM  To: Annette Trinidad MD           SUBJECTIVE:   Patient states he is doing well today.  He is pleased with his therapy and sees that he is making progress.  He has been out of bed and up to the chair.  He sleeps in a recliner at home.     OBJECTIVE:  I/O's    Intake/Output Summary (Last 24 hours) at 7/9/2021 1819  Last data filed at 7/9/2021 0309  Gross per 24 hour   Intake --   Output 225 ml   Net -225 ml       SCHEDULED MEDS:  • bumetanide  2 mg Oral Daily   • docusate sodium-sennosides  2 tablet Oral Nightly   • carbidopa-levodopa  0.5 tablet Oral Daily   • carbidopa-levodopa  1 tablet Oral BID   • potassium & sodium phosphates  2 packet Oral TID   • insulin glargine  16 Units Subcutaneous Nightly   • insulin lispro   Subcutaneous TID WC   • omeprazole  20 mg Oral Nightly   • lidocaine  1 patch Transdermal Daily   • metoPROLOL succinate  12.5 mg Oral BID   • glipiZIDE  5 mg Oral Daily   • aluminum-magnesium hydroxide-simethicone  30 mL Oral Nightly   • insulin lispro   Subcutaneous TID WC   • atorvastatin  40 mg Oral Daily   • clopidogrel  75 mg Oral Daily   • doxazosin  8 mg Oral Nightly   • heparin (porcine)  5,000 Units Subcutaneous 3 times per day   • isosorbide mononitrate  60 mg Oral Daily   • lidocaine  1 patch Transdermal Daily   • sucralfate  1 g Oral 4x Daily AC & HS   • insulin lispro   Subcutaneous Nightly       PRN MEDS:  docusate sodium-sennosides, calcium carbonate, sodium chloride, ALPRAZolam, sodium chloride, traMADol, dextrose, dextrose, dextrose, dextrose, glucagon, magnesium hydroxide, acetaminophen, bisacodyl, melatonin, [Held by provider] nitroGLYcerin    Last Recorded Vitals  Blood pressure 104/68, pulse 71, temperature 97.2 °F (36.2 °C), temperature source Oral, resp. rate 16, height 5' 9\" (1.753 m), weight 106.5 kg (234 lb 12.6 oz), SpO2 96 %.  Body mass index is 34.67 kg/m².    Physical Exam  Physical Exam  Vitals and nursing note reviewed.   Constitutional:       Appearance: Normal appearance. He is obese.   HENT:       Head: Normocephalic and atraumatic.      Nose: Nose normal.      Mouth/Throat:      Mouth: Mucous membranes are moist.   Eyes:      Conjunctiva/sclera: Conjunctivae normal.      Pupils: Pupils are equal, round, and reactive to light.   Cardiovascular:      Rate and Rhythm: Normal rate and regular rhythm.      Heart sounds: Normal heart sounds.   Pulmonary:      Effort: Pulmonary effort is normal. No respiratory distress.      Breath sounds: Normal breath sounds.   Abdominal:      General: Bowel sounds are normal.      Palpations: Abdomen is soft.      Tenderness: There is no abdominal tenderness.   Genitourinary:     Comments: Voids without difficulty  Musculoskeletal:         General: No swelling or deformity.      Cervical back: Neck supple. No rigidity.   Skin:     General: Skin is warm and dry.      Coloration: Skin is not jaundiced.      Findings: No bruising.   Neurological:      General: No focal deficit present.      Mental Status: He is alert and oriented to person, place, and time. Mental status is at baseline.      Comments: Right hand resting tremor   Psychiatric:         Mood and Affect: Mood normal.         Behavior: Behavior normal.         Labs     Recent Results (from the past 72 hour(s))   GLUCOSE, BEDSIDE - POINT OF CARE    Collection Time: 07/06/21  9:00 PM   Result Value Ref Range    GLUCOSE, BEDSIDE - POINT OF CARE 140 (H) 70 - 99 mg/dL   GLUCOSE, BEDSIDE - POINT OF CARE    Collection Time: 07/07/21  6:14 AM   Result Value Ref Range    GLUCOSE, BEDSIDE - POINT OF CARE 118 (H) 70 - 99 mg/dL   GLUCOSE, BEDSIDE - POINT OF CARE    Collection Time: 07/07/21 11:54 AM   Result Value Ref Range    GLUCOSE, BEDSIDE - POINT OF CARE 114 (H) 70 - 99 mg/dL   GLUCOSE, BEDSIDE - POINT OF CARE    Collection Time: 07/07/21  4:08 PM   Result Value Ref Range    GLUCOSE, BEDSIDE - POINT OF CARE 128 (H) 70 - 99 mg/dL   GLUCOSE, BEDSIDE - POINT OF CARE    Collection Time: 07/07/21  9:39 PM   Result Value Ref Range     GLUCOSE, BEDSIDE - POINT OF CARE 121 (H) 70 - 99 mg/dL   GLUCOSE, BEDSIDE - POINT OF CARE    Collection Time: 07/08/21  5:40 AM   Result Value Ref Range    GLUCOSE, BEDSIDE - POINT OF CARE 86 70 - 99 mg/dL   Basic Metabolic Panel    Collection Time: 07/08/21  6:15 AM   Result Value Ref Range    Fasting Status      Sodium 136 135 - 145 mmol/L    Potassium 4.5 3.4 - 5.1 mmol/L    Chloride 104 98 - 107 mmol/L    Carbon Dioxide 29 21 - 32 mmol/L    Anion Gap 8 (L) 10 - 20 mmol/L    Glucose 95 65 - 99 mg/dL    BUN 55 (H) 6 - 20 mg/dL    Creatinine 1.29 (H) 0.67 - 1.17 mg/dL    Glomerular Filtration Rate 51 (L) >90 mL/min/1.73m2    BUN/ Creatinine Ratio 43 (H) 7 - 25    Calcium 9.7 8.4 - 10.2 mg/dL   Phosphorus    Collection Time: 07/08/21  6:15 AM   Result Value Ref Range    Phosphorus 2.2 (L) 2.4 - 4.7 mg/dL   GLUCOSE, BEDSIDE - POINT OF CARE    Collection Time: 07/08/21 11:39 AM   Result Value Ref Range    GLUCOSE, BEDSIDE - POINT OF CARE 132 (H) 70 - 99 mg/dL   GLUCOSE, BEDSIDE - POINT OF CARE    Collection Time: 07/08/21  4:29 PM   Result Value Ref Range    GLUCOSE, BEDSIDE - POINT OF CARE 88 70 - 99 mg/dL   GLUCOSE, BEDSIDE - POINT OF CARE    Collection Time: 07/08/21  8:12 PM   Result Value Ref Range    GLUCOSE, BEDSIDE - POINT OF CARE 113 (H) 70 - 99 mg/dL   Basic Metabolic Panel    Collection Time: 07/09/21  6:04 AM   Result Value Ref Range    Fasting Status      Sodium 136 135 - 145 mmol/L    Potassium 4.1 3.4 - 5.1 mmol/L    Chloride 103 98 - 107 mmol/L    Carbon Dioxide 30 21 - 32 mmol/L    Anion Gap 7 (L) 10 - 20 mmol/L    Glucose 105 (H) 65 - 99 mg/dL    BUN 56 (H) 6 - 20 mg/dL    Creatinine 1.36 (H) 0.67 - 1.17 mg/dL    Glomerular Filtration Rate 48 (L) >90 mL/min/1.73m2    BUN/ Creatinine Ratio 41 (H) 7 - 25    Calcium 9.3 8.4 - 10.2 mg/dL   Phosphorus    Collection Time: 07/09/21  6:04 AM   Result Value Ref Range    Phosphorus 2.6 2.4 - 4.7 mg/dL   GLUCOSE, BEDSIDE - POINT OF CARE    Collection Time:  07/09/21  6:05 AM   Result Value Ref Range    GLUCOSE, BEDSIDE - POINT OF CARE 99 70 - 99 mg/dL   Lavender Top    Collection Time: 07/09/21  6:09 AM   Result Value Ref Range    Extra Tube Hold for Add Ons    GLUCOSE, BEDSIDE - POINT OF CARE    Collection Time: 07/09/21 11:49 AM   Result Value Ref Range    GLUCOSE, BEDSIDE - POINT OF CARE 89 70 - 99 mg/dL   GLUCOSE, BEDSIDE - POINT OF CARE    Collection Time: 07/09/21  3:57 PM   Result Value Ref Range    GLUCOSE, BEDSIDE - POINT OF CARE 99 70 - 99 mg/dL       Imaging  XR SHOULDER 3 VIEWS RIGHT  Narrative: EXAM: XR SHOULDER 3 VIEWS RIGHT    CLINICAL INDICATION: Right shoulder pain    COMPARISON: 06/21/2021     TECHNIQUE: Three views of the right shoulder joint were performed.    FINDINGS: There is no acute fracture.  There is marked subacromial space  narrowing.  There is a severe glenohumeral joint space narrowing with  subchondral sclerosis.  There is an ossification posterior to the humeral  head.  There is no lytic bone lesion.  Impression:  Severe glenohumeral arthritic change and subacromial space narrowing.   There is an ossified loose body posterior to the humeral head.  The  findings are unchanged compared to 06/21/2021.    Electronically Signed by: TAO RUIZ M.D.   Signed on: 7/1/2021 10:31 PM         ASSESSMENT/PLAN:    Recurrent anginal pain with history of coronary artery disease and CABG and chronic use of nitroglycerin:  - on 6/20 am, c/o chest pain, NTG x 4 given            - Pain control: prn Tylenol, lidocaine patch, ntg prn            - Continue Plavix 75 mg daily, Imdur 30 mg daily, atorvastatin 40 mg daily, and Metoprolol 50 mg Q12---ASA discontinued per  cardiology            - Supportive care with melatonin            - Nutrition: Cardiac diet            - Cardiology following: d/c'd ASA, added Carafate;  switching                Coreg to Metoprolol, also suggested to patient to take Maalox or Tums instead of ntg for symptomatic relief               - omeprazole 20 mg nightly and tums as this is helping and it is likely his angina is actually GERD.  Discussed eliminating caffeine from his diet as this can make his sphincters floppy.    Acute renal failure due to ATN, resolved with underlying chronic kidney disease stage 3b  - Creatinine 1.36 (11/9/2020) baseline  - Creat: 1.32 (7/4)-->1.36 (7/9)  - s/p IVFs with bicarb             - continue to monitor urine output             - IVFs d/c'd per nephrology             - Nephrology following, check labs MWF, continue bumex    S/p fall and generalized weakness likely due to the acute kidney injury and multiple electrolyte imbalances and Parkinson's and use of nitroglycerin tablets for GERD symptoms  - XR left knee (6/18): No fracture or dislocation.  Moderate to severe arthritic changes with chondral calcinosis  - TSH 0.515 (6/19)  - S/p IVFs with bicarb  - continue PT/OT    Right shoulder Osteoarthritis  - XR Right shoulder (6/21): No acute injury demonstrated and no interval change              - Pain control with prn Tylenol, tramadol, lidocaine patch              - continue bowel regimen              - maintain fall precautions              - PT/OT as tolerated on PM& R   -s/p CSI per Ortho (7/2); patient reports minimal relif    Hyperkalemia, resolved:  - K 5.8-->5.1-->WNL (7/9)  - S/P kayexalate              - nephrology following              - IVFs and sodium polystyrene d/c'd    Hyponatremia, likely hypovolemic, resolved:  - Na 140 (6/28)-->136 (7/9)                        - Monitor                - nephrology following, target to maintain at/above 130 meq/L.    Anion gap metabolic acidosis due to acute on chronic kidney disease and history of loose stool, resolved:  - CO2 16-->WNL  X 3 (6/22)  - S/P IVFs with bicarb               - Nephrology following, improved with bicarb supplementation    Chronic diastolic HFpEF, no acute exacerbation present:  - Last Echo 7/2020: EF 62 % without wall motion  abnormalities  - proBNP 3,082 (6/18)  - Takes Bumex at home              - resumed Bumex 1/2 dose 6/28   - Resumed Bumex 2mg home dose on 7/7              - Nephrology/Cardiology following              - monitor bp    Constipation:              - continue Bowel regimen with dulcolax supp/senna/MOM prn     Parkinson Disease, off medications PTA because of diarrhea per patient:              - Carbidopa levodopa  0.5 tab increased to 1 tab bid and 0.5 tab with lunch and dinner, recommend to increase over time to maintain compliance              - Neurology following              - PT/OT on PM& R as tolerated    Anemia of chronic kidney disease:  - Iron 32, TIBC 190, Ferritin and % iron sat WNL  - Hgb 9.9 (6/24)-->9.3 (7/6)               - Transfuse < 7               - IV iron infusion completed               - Nephrology managing    Thrombocytopenia, resolved:  - Plt 166 (6/24)-->163 (7/6)               - Monitor    Bilateral LE lymphedema in the setting of history of CHF:  - Patient wears bilateral NAYELI hose at home                - encourage NAYELI hose, leg elevation                - consider PT wound care consult    Primary hypertension:  - -139 last 24 hours               - continue home Cardura               - continue imdur at increased dose of 60mg daily               - Coreg changed to Metoprolol 12.5 mg BID contniue    Azotemia unclear etiology:  - ? NSAID use at home  -  (6/18)-->55 (7/8)-->56 (7/9)   - Monitoring BMP prn    Type 2 diabetes mellitus:  - Hgb A1c 5.8 this admission  - Glucose 67 (6/20 am): glucose gel given  - on Glipizide 5 mg daily at home--> resumed on 6/28  - on Lantus 40 units nightly at home  - BS improved to  last 24 hours after adding prandial insulin                - QAC/HS accu check with SSI coverage                - continue Lantus to 16 units nightly with glipizide 5 mg daily.                - continue lispro 4 units with meals (7/4)    Hyperlipidemia:  -  continue home Atorvastatin    Benign prostatic hypertrophy:  - continue home Cardura    GERD:  - continue home Protonix  - taking Tums prn cp instead of ntg and it is helping    Gout:  - on allopurinol at home  - has not had a gout flare in years, controls with diet            - hold allopurinol due to LILY and monitor (patient is agreeable) (6/26)    DVT: SCDs, heparin    DISPO: Pending progress on acute rehab    PCP:  Cristofer Prajapati MD     CODE STATUS:    Code Status: Full Resuscitation    Smoking Cessation  Counseling given: Not Answered  Comment: quit 50 years ago     All patient's questions and concerns were addressed.  All labs and studies have been reviewed.  D/w nurse  A face to face visit took place today.    Mervat Montalvo DO  Internal Medicine Hospitalist Attending Physician  [facility]  Perfect serve or [phone]

## 2021-07-28 DIAGNOSIS — E11.9 TYPE 2 DIABETES MELLITUS WITHOUT COMPLICATION, WITHOUT LONG-TERM CURRENT USE OF INSULIN (HCC): ICD-10-CM

## 2021-07-30 ENCOUNTER — TELEPHONE (OUTPATIENT)
Dept: INTERNAL MEDICINE | Facility: CLINIC | Age: 61
End: 2021-07-30

## 2021-07-30 NOTE — TELEPHONE ENCOUNTER
Caller: CHESTER    Relationship: REP WITH [facility] [employer]    Best call back number: [phone]    What is the best time to reach you: ANY    Who are you requesting to speak with (clinical staff, provider,  specific staff member): CLINICAL STAFF - PRIOR AUTHORIZATION    Do you require a callback: YES CHESTER WOULD LIKE TO KNOW IF A PRIOR AUTHORIZATION NEEDS TO BE RENEWED FOR THE MEDICATION Liraglutide (Victoza) 9 MG/3ML solution pen-injector injection FOR 30 DAYS?  PLEASE CONTACT CHESTER BACK TO CONFIRM REF # [id number]

## 2021-08-25 DIAGNOSIS — E11.9 TYPE 2 DIABETES MELLITUS WITHOUT COMPLICATION, WITHOUT LONG-TERM CURRENT USE OF INSULIN (HCC): ICD-10-CM

## 2021-09-17 DIAGNOSIS — E11.9 TYPE 2 DIABETES MELLITUS WITHOUT COMPLICATION, WITHOUT LONG-TERM CURRENT USE OF INSULIN (HCC): ICD-10-CM

## 2021-09-17 NOTE — TELEPHONE ENCOUNTER
Caller: Dionne Bond    Relationship: Self    Best call back number: [phone] (H)    Medication needed:   Requested Prescriptions     Pending Prescriptions Disp Refills   • metFORMIN (GLUCOPHAGE) 1000 MG tablet 60 tablet 0     Sig: Take 1 tablet by mouth 2 (Two) Times a Day With Meals.   • SITagliptin (Januvia) 100 MG tablet 90 tablet 2     Sig: Take 1 tablet by mouth Daily.   • Liraglutide (Victoza) 18 MG/3ML solution pen-injector injection 3 pen 5     Sig: Inject 1.8 mg under the skin into the appropriate area as directed Daily.       When do you need the refill by: ASAP    What additional details did the patient provide when requesting the medication: PATIENT'S INSURANCE IS REQUIRING HER TO SWITCH TO MAIL ORDER PHARMACY FOR PRESCRIPTIONS DUE TO COST, PLEASE SEND PRESCRIPTION REFILLS TO PHARMACY ASAP AND ADVISE PATIENT WHEN SENT TO PHARMACY    Does the patient have less than a 3 day supply:  [x] Yes  [] No    What is the patient's preferred pharmacy: [facility] - [facility] [address] [place] [address], [address] - [phone] [facility] [phone] FX

## 2021-09-23 DIAGNOSIS — E11.9 TYPE 2 DIABETES MELLITUS WITHOUT COMPLICATION, WITHOUT LONG-TERM CURRENT USE OF INSULIN (HCC): ICD-10-CM

## 2021-09-23 RX ORDER — LIRAGLUTIDE 6 MG/ML
1.8 INJECTION SUBCUTANEOUS DAILY
Qty: 9 PEN | Refills: 1 | Status: SHIPPED | OUTPATIENT
Start: 2021-09-23 | End: 2021-09-23 | Stop reason: SDUPTHER

## 2021-09-23 RX ORDER — LIRAGLUTIDE 6 MG/ML
1.8 INJECTION SUBCUTANEOUS DAILY
Qty: 3 PEN | Refills: 5 | Status: CANCELLED | OUTPATIENT
Start: 2021-09-23

## 2021-09-23 RX ORDER — LIRAGLUTIDE 6 MG/ML
1.8 INJECTION SUBCUTANEOUS DAILY
Qty: 9 PEN | Refills: 1 | Status: SHIPPED | OUTPATIENT
Start: 2021-09-23 | End: 2022-02-07

## 2021-09-23 NOTE — TELEPHONE ENCOUNTER
PATIENT NEEDS REFILL ON:Liraglutide (Victoza) 18 MG/3ML solution pen-injector injection  metFORMIN (GLUCOPHAGE) 1000 MG tablet  SITagliptin (Januvia) 100 MG tablet     PATIENT CAN BE REACHED ON: [phone]     [facility] [place], [place] - [address] [facility], [address] - [phone]  - [phone]   [phone]

## 2021-09-25 LAB
25(OH)D3+25(OH)D2 SERPL-MCNC: 32.8 NG/ML (ref 30–100)
ALBUMIN SERPL-MCNC: 4.7 G/DL (ref 3.5–5.2)
ALBUMIN/GLOB SERPL: 2.4 G/DL
ALP SERPL-CCNC: 82 U/L (ref 39–117)
ALT SERPL-CCNC: 23 U/L (ref 1–33)
AST SERPL-CCNC: 18 U/L (ref 1–32)
BILIRUB SERPL-MCNC: 0.2 MG/DL (ref 0–1.2)
BUN SERPL-MCNC: 11 MG/DL (ref 6–20)
BUN/CREAT SERPL: 13.8 (ref 7–25)
CALCIUM SERPL-MCNC: 9.7 MG/DL (ref 8.6–10.5)
CHLORIDE SERPL-SCNC: 104 MMOL/L (ref 98–107)
CHOLEST SERPL-MCNC: 288 MG/DL (ref 0–200)
CHOLEST/HDLC SERPL: 5.43 {RATIO}
CO2 SERPL-SCNC: 23.8 MMOL/L (ref 22–29)
CREAT SERPL-MCNC: 0.8 MG/DL (ref 0.57–1)
ERYTHROCYTE [DISTWIDTH] IN BLOOD BY AUTOMATED COUNT: 13.6 % (ref 12.3–15.4)
GLOBULIN SER CALC-MCNC: 2 GM/DL
GLUCOSE SERPL-MCNC: 120 MG/DL (ref 65–99)
HBA1C MFR BLD: 6.2 % (ref 4.8–5.6)
HCT VFR BLD AUTO: 42.9 % (ref 34–46.6)
HDLC SERPL-MCNC: 53 MG/DL (ref 40–60)
HGB BLD-MCNC: 13.8 G/DL (ref 12–15.9)
LDLC SERPL CALC-MCNC: 193 MG/DL (ref 0–100)
MCH RBC QN AUTO: 28.7 PG (ref 26.6–33)
MCHC RBC AUTO-ENTMCNC: 32.2 G/DL (ref 31.5–35.7)
MCV RBC AUTO: 89.2 FL (ref 79–97)
PLATELET # BLD AUTO: 273 10*3/MM3 (ref 140–450)
POTASSIUM SERPL-SCNC: 4 MMOL/L (ref 3.5–5.2)
PROT SERPL-MCNC: 6.7 G/DL (ref 6–8.5)
RBC # BLD AUTO: 4.81 10*6/MM3 (ref 3.77–5.28)
SODIUM SERPL-SCNC: 140 MMOL/L (ref 136–145)
TRIGL SERPL-MCNC: 219 MG/DL (ref 0–150)
TSH SERPL DL<=0.005 MIU/L-ACNC: 1.82 UIU/ML (ref 0.45–4.5)
VLDLC SERPL CALC-MCNC: 42 MG/DL (ref 5–40)
WBC # BLD AUTO: 5.78 10*3/MM3 (ref 3.4–10.8)

## 2021-11-02 ENCOUNTER — HOSPITAL ENCOUNTER (OUTPATIENT)
Dept: GENERAL RADIOLOGY | Facility: HOSPITAL | Age: 61
Discharge: HOME | End: 2021-11-02
Admitting: NURSE PRACTITIONER

## 2021-11-02 ENCOUNTER — OFFICE VISIT (OUTPATIENT)
Dept: INTERNAL MEDICINE | Facility: CLINIC | Age: 61
End: 2021-11-02

## 2021-11-02 VITALS
HEART RATE: 78 BPM | OXYGEN SATURATION: 98 % | BODY MASS INDEX: 27.55 KG/M2 | RESPIRATION RATE: 16 BRPM | DIASTOLIC BLOOD PRESSURE: 70 MMHG | WEIGHT: 171.4 LBS | HEIGHT: 66 IN | TEMPERATURE: 97.5 F | SYSTOLIC BLOOD PRESSURE: 115 MMHG

## 2021-11-02 DIAGNOSIS — F32.9 REACTIVE DEPRESSION: ICD-10-CM

## 2021-11-02 DIAGNOSIS — M25.511 CHRONIC RIGHT SHOULDER PAIN: ICD-10-CM

## 2021-11-02 DIAGNOSIS — Z23 ENCOUNTER FOR IMMUNIZATION: ICD-10-CM

## 2021-11-02 DIAGNOSIS — Z00.00 ENCOUNTER FOR WELLNESS EXAMINATION IN ADULT: Primary | ICD-10-CM

## 2021-11-02 DIAGNOSIS — E11.9 TYPE 2 DIABETES MELLITUS WITHOUT COMPLICATION, WITHOUT LONG-TERM CURRENT USE OF INSULIN (HCC): ICD-10-CM

## 2021-11-02 DIAGNOSIS — E78.2 MIXED HYPERLIPIDEMIA: ICD-10-CM

## 2021-11-02 DIAGNOSIS — E55.9 VITAMIN D DEFICIENCY: ICD-10-CM

## 2021-11-02 DIAGNOSIS — G89.29 CHRONIC RIGHT SHOULDER PAIN: ICD-10-CM

## 2021-11-02 PROCEDURE — 90471 IMMUNIZATION ADMIN: CPT | Performed by: NURSE PRACTITIONER

## 2021-11-02 PROCEDURE — 73030 X-RAY EXAM OF SHOULDER: CPT

## 2021-11-02 PROCEDURE — 99396 PREV VISIT EST AGE 40-64: CPT | Performed by: NURSE PRACTITIONER

## 2021-11-02 PROCEDURE — 90686 IIV4 VACC NO PRSV 0.5 ML IM: CPT | Performed by: NURSE PRACTITIONER

## 2021-11-02 RX ORDER — SERTRALINE HYDROCHLORIDE 100 MG/1
150 TABLET, FILM COATED ORAL DAILY
Qty: 135 TABLET | Refills: 3 | Status: SHIPPED | OUTPATIENT
Start: 2021-11-02 | End: 2022-10-13

## 2021-11-02 NOTE — PROGRESS NOTES
Injection  Injection performed by Jennifer Perez MA. Patient tolerated the procedure well without complications.  11/02/21   Jennifer Perez MA

## 2021-11-02 NOTE — PROGRESS NOTES
SUBJECTIVE    Dionne is a 60 y.o. female presenting for Annual Exam, Shoulder Pain (Right side), Follow-up, Hyperlipidemia, Diabetes, Depression, and Vit D def    Her current/chronic health conditions include:  Patient Active Problem List   Diagnosis   • Mixed hyperlipidemia   • Type 2 diabetes mellitus without complication (HCC)   • Vitamin D deficiency   • Depression       Current Outpatient Medications on File Prior to Visit   Medication Sig   • aspirin 81 MG chewable tablet Chew 81 mg Daily.   • cetirizine (zyrTEC) 10 MG tablet Take 10 mg by mouth Daily.   • Ergocalciferol 400 UNITS tablet Take 2 tablets by mouth Daily.   • glucose blood test strip 1 each by Other route Daily. Please dispense the test strips required w/ the patient's glucometer.   • glucose monitor monitoring kit 1 each Daily. Please dispense the glucometer preferred by the patient's insurance formulary.   • Insulin Pen Needle (BD Pen Needle Samara U/F) 32G X 4 MM misc 1 each Daily.   • Liraglutide (Victoza) 18 MG/3ML solution pen-injector injection Inject 1.8 mg under the skin into the appropriate area as directed Daily.   • metFORMIN (GLUCOPHAGE) 1000 MG tablet Take 1 tablet by mouth 2 (Two) Times a Day With Meals.   • ONE TOUCH LANCETS misc Use to check blood sugar 3 times per day for 3-4 days and then once per day   • SITagliptin (Januvia) 100 MG tablet Take 1 tablet by mouth Daily.   • [DISCONTINUED] predniSONE (DELTASONE) 10 MG tablet 6 tabs PO QD x 2days, then 5 tabs QD x2days, then 4 tabs QD x2days, then 3 tabs QD x2days, then 2 tabs QD x2 days, then 1 tab QD x2days   • [DISCONTINUED] sertraline (ZOLOFT) 100 MG tablet Take 1.5 tablets by mouth Daily for 30 days.   • [DISCONTINUED] triamcinolone (KENALOG) 0.1 % ointment Apply  topically to the appropriate area as directed 2 (Two) Times a Day.     No current facility-administered medications on file prior to visit.     In terms of her chronic health conditions, she self d/c'ed Lipitor d/t  "some concerns r/t dementia. Both of her parents have dementia. There is significant stress d/t her family situation w/ her parents. She has run out of Zoloft and thought she might do a trial without it but is struggling.    Health Habits:  Nutrition: very focused on balanced nutrition and wgt loss      Screenings:  PAP: UTD  Mammogram: UTD  Dexa: n/a  Colon Cancer: due  Eye Exam - Pema 02/18/2021    Complaints today include:  R shoulder pain. Unable to lie on her R side. She has appt scheduled w/ Dr. Hoover w/ Ortho for eval. Onset was several months ago. No relief w/ Ibuprofen. There was a remote injury but now worse.      The following portions of the patient's history were reviewed and updated as appropriate: allergies, current medications, problem list, past medical history, past family history, and past social history.    Review of Systems   Psychiatric/Behavioral: The patient is nervous/anxious.    All other systems reviewed and are negative.      OBJECTIVE    Vitals:    11/02/21 0834   BP: 115/70   Pulse: 78   Resp: 16   Temp: 97.5 °F (36.4 °C)   SpO2: 98%   Weight: 77.7 kg (171 lb 6.4 oz)   Height: 167.6 cm (65.98\")       BP Readings from Last 3 Encounters:   11/02/21 115/70   06/18/21 124/78   06/09/21 142/86        Wt Readings from Last 3 Encounters:   11/02/21 77.7 kg (171 lb 6.4 oz)   06/18/21 86.6 kg (191 lb)   06/09/21 87.1 kg (192 lb)        Body mass index is 27.68 kg/m².  Nursing notes and vital signs reviewed.      Physical Exam  Constitutional:       General: She is not in acute distress.     Appearance: Normal appearance. She is well-developed.   HENT:      Head: Normocephalic.      Right Ear: Hearing, tympanic membrane, ear canal and external ear normal.      Left Ear: Hearing, tympanic membrane, ear canal and external ear normal.      Nose: Nose normal. No mucosal edema or rhinorrhea.      Mouth/Throat:      Mouth: Mucous membranes are moist.      Pharynx: Oropharynx is clear. Uvula " midline.   Eyes:      General: Lids are normal.      Extraocular Movements: Extraocular movements intact.      Conjunctiva/sclera: Conjunctivae normal.      Pupils: Pupils are equal, round, and reactive to light.   Neck:      Thyroid: No thyroid mass or thyromegaly.   Cardiovascular:      Rate and Rhythm: Regular rhythm.      Pulses: Normal pulses.      Heart sounds: S1 normal and S2 normal. No murmur heard.  No friction rub. No gallop.    Pulmonary:      Effort: Pulmonary effort is normal.      Breath sounds: Normal breath sounds. No wheezing, rhonchi or rales.   Abdominal:      General: Bowel sounds are normal.      Palpations: Abdomen is soft.      Tenderness: There is no abdominal tenderness. There is no guarding.      Hernia: No hernia is present.   Musculoskeletal:         General: No deformity.      Right shoulder: Bony tenderness present. No deformity or crepitus. Decreased range of motion. Normal strength.      Cervical back: Normal range of motion and neck supple.   Lymphadenopathy:      Cervical: No cervical adenopathy.   Skin:     General: Skin is warm and dry.      Findings: No lesion or rash.   Neurological:      General: No focal deficit present.      Mental Status: She is alert and oriented to person, place, and time.      Cranial Nerves: No cranial nerve deficit.      Sensory: No sensory deficit.      Motor: Motor function is intact.      Coordination: Coordination is intact.      Gait: Gait normal.      Deep Tendon Reflexes: Reflexes are normal and symmetric.   Psychiatric:         Attention and Perception: She is attentive.         Mood and Affect: Mood and affect normal.         Speech: Speech normal.         Behavior: Behavior normal.         Thought Content: Thought content normal.         No results found for this or any previous visit (from the past 672 hour(s)).      ASSESSMENT AND PLAN    Diagnoses and all orders for this visit:    1. Encounter for wellness examination in adult  (Primary)    2. Encounter for immunization  -     FluLaval/Fluarix/Fluzone >6 Months ([id number])    3. Type 2 diabetes mellitus without complication, without long-term current use of insulin (HCC)  Comments:  - controlled    4. Mixed hyperlipidemia  Comments:  - uncontrolled  - lengthy discussion re known risk of dementia w/ uncontrolled HLD  - pt will restart statin    5. Reactive depression  Comments:  - controlled  Orders:  -     sertraline (ZOLOFT) 100 MG tablet; Take 1.5 tablets by mouth Daily.  Dispense: 135 tablet; Refill: 3    6. Vitamin D deficiency  Comments:  - stable    7. Chronic right shoulder pain  -     XR Shoulder 2+ View Right  - discussed anti-inflammatory and PT but since she has appt w/ Ortho tomorrow will defer to them        Preventative counseling completed including relevant screenings, appropriate vaccinations, healthy nutrition, and appropriate physical activity.  Patient's Body mass index is 27.68 kg/m². indicating that she is overweight (BMI 25-29.9). Obesity-related health conditions include the following: diabetes mellitus and dyslipidemias. Obesity is improving with lifestyle modifications. BMI is is above average; BMI management plan is completed. We discussed low calorie, low carb based diet program, portion control, increasing exercise and joining a fitness center or start home based exercise program..      Except as noted above, pt will continue current medications as noted in the medication list. I will continue to authorize refills as needed.    Advised Dionne that she is due for repeat CLS and she will call to schedule this.      Medications, including side effects, were discussed with the patient. Patient verbalized understanding.  The plan of care was discussed. All questions were answered. Patient verbalized understanding.        Return in about 6 months (around 5/2/2022) for fasting labs one week prior to., or sooner if needed.

## 2021-11-03 ENCOUNTER — PATIENT MESSAGE (OUTPATIENT)
Dept: INTERNAL MEDICINE | Facility: CLINIC | Age: 61
End: 2021-11-03

## 2021-11-03 DIAGNOSIS — E78.2 MIXED HYPERLIPIDEMIA: Primary | ICD-10-CM

## 2021-11-05 DIAGNOSIS — Z12.11 SCREENING FOR MALIGNANT NEOPLASM OF COLON: Primary | ICD-10-CM

## 2021-11-05 RX ORDER — ATORVASTATIN CALCIUM 40 MG/1
40 TABLET, FILM COATED ORAL NIGHTLY
Qty: 90 TABLET | Refills: 1 | Status: SHIPPED | OUTPATIENT
Start: 2021-11-05 | End: 2022-05-06

## 2021-11-05 NOTE — TELEPHONE ENCOUNTER
From: Dionne Bond  To: LYRIC Valencia  Sent: 11/3/2021 9:04 PM EDT  Subject: Atorvastatin prescription    Linnette Pierre. Would you send a prescription to [facility] for me. I appreciate you talking to me yesterday about the benefit vs. risk of it, and feel like it would be worth it until we know otherwise. Thank you. Dionne

## 2021-12-20 ENCOUNTER — TELEMEDICINE (OUTPATIENT)
Dept: INTERNAL MEDICINE | Facility: CLINIC | Age: 61
End: 2021-12-20

## 2021-12-20 DIAGNOSIS — J01.00 ACUTE NON-RECURRENT MAXILLARY SINUSITIS: Primary | ICD-10-CM

## 2021-12-20 PROCEDURE — 99213 OFFICE O/P EST LOW 20 MIN: CPT | Performed by: NURSE PRACTITIONER

## 2021-12-20 RX ORDER — BENZONATATE 100 MG/1
100 CAPSULE ORAL 3 TIMES DAILY PRN
Qty: 60 CAPSULE | Refills: 0 | Status: SHIPPED | OUTPATIENT
Start: 2021-12-20 | End: 2022-01-09

## 2021-12-20 RX ORDER — AMOXICILLIN AND CLAVULANATE POTASSIUM 875; 125 MG/1; MG/1
1 TABLET, FILM COATED ORAL 2 TIMES DAILY
Qty: 20 TABLET | Refills: 0 | Status: SHIPPED | OUTPATIENT
Start: 2021-12-20 | End: 2021-12-30

## 2021-12-20 NOTE — PROGRESS NOTES
Dionne Bond is a 60 y.o. female presenting today for   Chief Complaint   Patient presents with   • URI     This visit was conducted via [facility].  I am located in my office and the patient in her home.    Subjective    History of Present Illness     Unfortunately, she informs me her mother recently passed away and her father is dying as well.    Onset was one week ago following her mother's .  Scratchy throat, chest congestion, sinus congestion, productive cough, HA.  No fever.  Several family members sick w/ same and have been negative for COVID and flu.  OTCs: Mucinex, Dayquil      The following portions of the patient's history were reviewed and updated as appropriate: allergies, current medications, problem list, past medical history, past surgical history, family history, and social history.    Review of Systems   Constitutional: Negative for fever.   HENT: Positive for congestion, rhinorrhea and sinus pressure.         Diminished but no loss of taste or smell.   Respiratory: Positive for cough. Negative for shortness of breath.    Gastrointestinal: Negative for diarrhea and vomiting.   Neurological: Positive for headache.         Objective      Physical Exam  Constitutional:       General: She is not in acute distress.     Appearance: She is well-developed.   HENT:      Nose:      Right Sinus: Maxillary sinus tenderness present.      Left Sinus: Maxillary sinus tenderness present.   Pulmonary:      Effort: Pulmonary effort is normal.      Comments: Persistent cough  Neurological:      Mental Status: She is alert.   Psychiatric:         Attention and Perception: She is attentive.         Speech: Speech normal.           Assessment and Plan    Diagnoses and all orders for this visit:    1. Acute non-recurrent maxillary sinusitis (Primary)  -     amoxicillin-clavulanate (Augmentin) 875-125 MG per tablet; Take 1 tablet by mouth 2 (Two) Times a Day for 10 days.  Dispense: 20 tablet; Refill:  0  -     benzonatate (Tessalon Perles) 100 MG capsule; Take 1 capsule by mouth 3 (Three) Times a Day As Needed for Cough for up to 20 days.  Dispense: 60 capsule; Refill: 0            Medications, including side effects, were discussed with the patient. Patient verbalized understanding.  The plan of care was discussed. All questions were answered. Patient verbalized understanding.        Return if symptoms worsen or fail to improve.

## 2022-01-01 NOTE — PROGRESS NOTES
PATIENT INFORMATION  Dionne Bond   - 1960    CHIEF COMPLAINT  Chief Complaint   Patient presents with   • Follow-up     F/U C-SCOPE, NO COMPLAINTS    HISTORY OF PRESENT ILLNESS  HPI  Patient presents to the office today upon my request.  She is a very pleasant 57 year old female that was referred to me for screening colonoscopy she was one of the fast-track patience and initially was not seen in the office.  At the time of her screening colonoscopy performed at [facility] on 7/3/18 is small polyp was noted in the cecum at the appendiceal Orifice.  This was minute and had some mucosal irritation/erythema associated with it this removed with cold biopsy forceps and the pathology is concerning for cryptitis/crypt abscess with distortion of architectural but no definite granuloma and no dysplasia reported.  I have a call out to the pathologist and I'm so waiting for a call back.  Patient reports she is doing very well.  She does not offer any complaints of abdominal or pelvic pain, alternating bowel movements, melena, hematochezia, nausea, vomiting or weight changes.   She denies family history of colon cancer or colon polyp.  Her nephew i.e. sister's son has Crohn's disease.  She is unaware of any family members and her family with Crohn's disease or ulcerative colitis other than mentioned above.    REVIEW OF SYSTEMS  Review of Systems   Constitutional: Negative.    Respiratory: Negative.    Cardiovascular: Negative.    Gastrointestinal: Negative.          ACTIVE PROBLEMS  Patient Active Problem List    Diagnosis   • Colon cancer screening [Z12.11]     Overview Note:     Added automatically from request for surgery [id number]     • Abscess of vulva [N76.4]   • Depression [F32.9]   • Vitamin D deficiency [E55.9]   • Obesity (BMI 30.0-34.9) [E66.9]   • Mixed hyperlipidemia [E78.2]   • Type 2 diabetes mellitus without complication (CMS/HCC) [E11.9]         PAST MEDICAL HISTORY  Past Medical  History:   Diagnosis Date   • Depression    • Diabetes mellitus (CMS/HCC)    • Diastasis recti    • Hyperlipidemia    • Obesity          SURGICAL HISTORY  Past Surgical History:   Procedure Laterality Date   • COLONOSCOPY N/A 7/3/2018    Procedure: COLONOSCOPY with polypectomy;  Surgeon: Annette Trinidad MD;  Location: [facility];  Service: Gastroenterology   • DILATATION AND CURETTAGE  1990   • FOOT SURGERY Left     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     Social History Main Topics   • Smoking status: Never Smoker   • Smokeless tobacco: Never Used   • Alcohol use Yes      Comment: occasionally   • Drug use: No   • Sexual activity: Yes     Partners: Male         CURRENT MEDICATIONS    Current Outpatient Prescriptions:   •  aspirin 81 MG chewable tablet, Chew 81 mg Daily., Disp: , Rfl:   •  atorvastatin (LIPITOR) 40 MG tablet, Take 1 tablet by mouth Every Night., Disp: 90 tablet, Rfl: 3  •  Blood Glucose Monitoring Suppl (ONE TOUCH ULTRA SYSTEM KIT) W/DEVICE kit, Check blood sugar 3-4 times per day for first 3 days and then once per day thereafter, Disp: 1 each, Rfl: 0  •  cetirizine (zyrTEC) 10 MG tablet, Take 10 mg by mouth Daily., Disp: , Rfl:   •  Ergocalciferol 400 UNITS tablet, Take 2 tablets by mouth Daily., Disp: 30 tablet, Rfl: 6  •  glucose blood test strip, Use as instructed, Disp: 100 each, Rfl: 12  •  Linagliptin-Metformin HCl ER (JENTADUETO XR) 2.5-1000 MG tablet sustained-release 24 hour, Take 2 tablets by mouth Every Morning., Disp: 180 tablet, Rfl: 3  •  ONE TOUCH LANCETS misc, Use to check blood sugar  "3 times per day for 3-4 days and then once per day, Disp: 200 each, Rfl: 11  •  sertraline (ZOLOFT) 50 MG tablet, Take 2 tablets by mouth Daily., Disp: 90 tablet, Rfl: 3    ALLERGIES  Patient has no known allergies.    VITALS  Vitals:    07/27/18 1432   BP: 122/74   Weight: 90.3 kg (199 lb)   Height: 167.6 cm (66\")       LAST RESULTS   Admission on 07/03/2018, Discharged on 07/03/2018   Component Date Value Ref Range Status   • Glucose 07/03/2018 169* 70 - 130 mg/dL Final   • Case Report 07/03/2018    Final                    Value:Surgical Pathology Report                         Case: [id number]                                  Authorizing Provider:  Annette Trinidad MD       Collected:           07/03/2018 09:14 AM          Ordering Location:     [facility]   Received:            07/03/2018 11:42 AM                                 OR                                                                           Pathologist:           Jose A Payne MD                                                          Specimen:    Large Intestine, Cecum, Orifice of appendix polyp x 1                                     • Final Diagnosis 07/03/2018    Final                    Value:This result contains rich text formatting which cannot be displayed here.     No results found.    PHYSICAL EXAM  Physical Exam   Constitutional: She appears well-developed and well-nourished.   Cardiovascular: Normal rate, regular rhythm and normal heart sounds.    Pulmonary/Chest: Breath sounds normal.   Abdominal:   Soft, nondistended nontender positive bowel sounds in all 4 quadrants   Nursing note and vitals reviewed.      ASSESSMENT  Crypt abscess/acute inflammation/cryptitis  Question localized colitis versus IBD  Most recent colonoscopy with minute 2 mm polyp at appendiceal orifice with the above-mentioned pathology  Internal hemorrhoids-asymptomatic    Discussed with patient will defer to gastroenterology to get their opinion " and further workup if needed to rule out IBD  I'm still waiting to hear back from pathology and will follow-up with them on Monday      PLAN  Referral GI-Dr. Wilcox  For now I have discussed with her repeat colonoscopy in 3 years.  Certainly this can change pending recommendations by Dr. Wilcox/gastroenterology.  I will see her now on a as needed basis.  Patient was advised call the office sooner should she have worsening symptoms or go to the nearest emergency room.                             2022

## 2022-02-05 DIAGNOSIS — E11.9 TYPE 2 DIABETES MELLITUS WITHOUT COMPLICATION, WITHOUT LONG-TERM CURRENT USE OF INSULIN: ICD-10-CM

## 2022-02-07 RX ORDER — LIRAGLUTIDE 6 MG/ML
INJECTION SUBCUTANEOUS
Qty: 27 ML | Refills: 3 | Status: SHIPPED | OUTPATIENT
Start: 2022-02-07

## 2022-03-02 ENCOUNTER — TELEPHONE (OUTPATIENT)
Dept: GASTROENTEROLOGY | Facility: CLINIC | Age: 62
End: 2022-03-02

## 2022-03-02 ENCOUNTER — PREP FOR SURGERY (OUTPATIENT)
Dept: OTHER | Facility: HOSPITAL | Age: 62
End: 2022-03-02

## 2022-03-02 DIAGNOSIS — Z86.010 PERSONAL HISTORY OF COLONIC POLYPS: Primary | ICD-10-CM

## 2022-03-02 NOTE — TELEPHONE ENCOUNTER
FAST TRACK - REFERRAL - LAST COLONOSCOPY 07/03/2018, DR. ALVAREZ - PERSONAL HISTORY POLYPS - NO FAM HX CRC/P- SCHEDULE AT [place].

## 2022-03-07 ENCOUNTER — NURSE TRIAGE (OUTPATIENT)
Dept: CALL CENTER | Facility: HOSPITAL | Age: 62
End: 2022-03-07

## 2022-03-07 NOTE — TELEPHONE ENCOUNTER
HUB- She fell on Saturday the left hand is swollen. She states the thumb is purple around it, and the 1st 2 knuckles. She can use the hand. She cannot hold anything heavy with the hand. She plans to go to urgent care. See care advice.     Reason for Disposition  • Large swelling or bruise    Additional Information  • Negative: [1] Major bleeding (e.g., actively dripping or spurting) AND [2] can't be stopped  • Negative: Amputation  • Negative: Serious injury with multiple fractures (broken bones)  • Negative: Sounds like a life-threatening emergency to the triager  • Finger injury is main concern  • Negative: [1] Major bleeding (e.g., spurting blood) AND [2] can't be stopped  • Negative: Wound looks infected  • Negative: Caused by animal bite  • Negative: Caused by human bite  • Negative: Amputated finger  • Negative: Skin is split open or gaping (or length > 1/2 inch or 12 mm)  • Negative: [1] Bleeding AND [2] won't stop after 10 minutes of direct pressure (using correct technique)  • Negative: [1] Dirt in the wound AND [2] not removed with 15 minutes of scrubbing  • Negative: High pressure injection injury (e.g., from grease gun or paint gun, usually work-related)  • Negative: Looks like a broken bone (e.g., crooked or deformed)  • Negative: Looks like a dislocated joint (e.g., crooked or deformed)  • Negative: [1] Fingernail is partially torn AND [2] from crush injury  (Exception: torn nail from catching it on something)  • Negative: [1] Cut AND [2] numbness (loss of sensation) of finger  • Negative: [1] Cut AND [2] finger joint can't be opened (straightened) or closed (bent) completely  • Negative: Sounds like a serious injury to the triager  • Negative: [1] SEVERE pain AND [2] not improved 2 hours after pain medicine/ice packs  • Negative: [1] MODERATE-SEVERE pain AND [2] blood present under a nail  • Negative: Fingernail is completely torn off (fingernail avulsion)  • Negative: Base of fingernail has popped  "out of the skin fold (cuticle)  • Negative: Suspicious history for the injury    Answer Assessment - Initial Assessment Questions  1. MECHANISM: \"How did the injury happen?\"      She fell on Saturday, and hit the palm of the left hand.  2. ONSET: \"When did the injury happen?\" (Minutes or hours ago)       Saturday   3. APPEARANCE of INJURY: \"What does the injury look like?\"       It looks swollen the left hand the thumb is purple.   4. SEVERITY: \"Can you use the hand normally?\" \"Can you bend your fingers into a ball and then fully open them?\"      She cannot use the hand normally. She can make a fist, not use it.   5. SIZE: For cuts, bruises, or swelling, ask: \"How large is it?\" (e.g., inches or centimeters;  entire hand or wrist)       none  6. PAIN: \"Is there pain?\" If Yes, ask: \"How bad is the pain?\"  (Scale 1-10; or mild, moderate, severe)      Moderate pain.   7. TETANUS: For any breaks in the skin, ask: \"When was the last tetanus booster?\"      none  8. OTHER SYMPTOMS: \"Do you have any other symptoms?\"       bruise  9. PREGNANCY: \"Is there any chance you are pregnant?\" \"When was your last menstrual period?\"      n    Answer Assessment - Initial Assessment Questions  1. MECHANISM: \"How did the injury happen?\"       Fell on Saturday   2. ONSET: \"When did the injury happen?\" (Minutes or hours ago)       Saturday   3. LOCATION: \"What part of the finger is injured?\" \"Is the nail damaged?\"       The Thumb   4. APPEARANCE of the INJURY: \"What does the injury look like?\"       brusied and swollen   5. SEVERITY: \"Can you use the hand normally?\"  \"Can you bend your fingers into a ball and then fully open them?\"      no  6. SIZE: For cuts, bruises, or swelling, ask: \"How large is it?\" (e.g., inches or centimeters;  entire finger)       Swelling   7. PAIN: \"Is there pain?\" If Yes, ask: \"How bad is the pain?\"    (e.g., Scale 1-10; or mild, moderate, severe)   - NONE (0): no pain.   - MILD (1-3): doesn't interfere with " "normal activities.    - MODERATE (4-7): interferes with normal activities or awakens from sleep.   - SEVERE (8-10): excruciating pain, unable to hold a glass of water or bend finger even a little.      7   8. TETANUS: For any breaks in the skin, ask: \"When was the last tetanus booster?\"      unknown  9. OTHER SYMPTOMS: \"Do you have any other symptoms?\"      Bruises.   10. PREGNANCY: \"Is there any chance you are pregnant?\" \"When was your last menstrual period?\"        n    Protocols used: FINGER INJURY-ADULT-AH, HAND AND WRIST INJURY-ADULT-AH      "

## 2022-03-11 PROBLEM — Z86.0100 PERSONAL HISTORY OF COLONIC POLYPS: Status: ACTIVE | Noted: 2022-03-11

## 2022-03-11 PROBLEM — Z86.010 PERSONAL HISTORY OF COLONIC POLYPS: Status: ACTIVE | Noted: 2022-03-11

## 2022-03-11 NOTE — TELEPHONE ENCOUNTER
Spoke with patient.  Scheduled at [place] on 08/31/2022 at 9:30am - arrive 8:30am.  Will mail instructions.

## 2022-04-25 ENCOUNTER — LAB (OUTPATIENT)
Dept: INTERNAL MEDICINE | Facility: CLINIC | Age: 62
End: 2022-04-25

## 2022-04-25 DIAGNOSIS — E11.9 TYPE 2 DIABETES MELLITUS WITHOUT COMPLICATION, WITHOUT LONG-TERM CURRENT USE OF INSULIN: ICD-10-CM

## 2022-04-25 DIAGNOSIS — E78.2 MIXED HYPERLIPIDEMIA: ICD-10-CM

## 2022-04-26 LAB
ALBUMIN SERPL-MCNC: 4.6 G/DL (ref 3.8–4.9)
ALBUMIN/GLOB SERPL: 2.6 {RATIO} (ref 1.2–2.2)
ALP SERPL-CCNC: 67 IU/L (ref 44–121)
ALT SERPL-CCNC: 11 IU/L (ref 0–32)
AST SERPL-CCNC: 13 IU/L (ref 0–40)
BILIRUB SERPL-MCNC: 0.2 MG/DL (ref 0–1.2)
BUN SERPL-MCNC: 14 MG/DL (ref 8–27)
BUN/CREAT SERPL: 17 (ref 12–28)
CALCIUM SERPL-MCNC: 9.3 MG/DL (ref 8.7–10.3)
CHLORIDE SERPL-SCNC: 103 MMOL/L (ref 96–106)
CHOLEST SERPL-MCNC: 194 MG/DL (ref 100–199)
CHOLEST/HDLC SERPL: 3.3 RATIO (ref 0–4.4)
CO2 SERPL-SCNC: 22 MMOL/L (ref 20–29)
CREAT SERPL-MCNC: 0.83 MG/DL (ref 0.57–1)
EGFRCR SERPLBLD CKD-EPI 2021: 81 ML/MIN/1.73
GLOBULIN SER CALC-MCNC: 1.8 G/DL (ref 1.5–4.5)
GLUCOSE SERPL-MCNC: 91 MG/DL (ref 65–99)
HBA1C MFR BLD: 6.2 % (ref 4.8–5.6)
HDLC SERPL-MCNC: 59 MG/DL
LDLC SERPL CALC-MCNC: 114 MG/DL (ref 0–99)
POTASSIUM SERPL-SCNC: 4.5 MMOL/L (ref 3.5–5.2)
PROT SERPL-MCNC: 6.4 G/DL (ref 6–8.5)
SODIUM SERPL-SCNC: 141 MMOL/L (ref 134–144)
TRIGL SERPL-MCNC: 121 MG/DL (ref 0–149)
VLDLC SERPL CALC-MCNC: 21 MG/DL (ref 5–40)

## 2022-05-02 ENCOUNTER — OFFICE VISIT (OUTPATIENT)
Dept: INTERNAL MEDICINE | Facility: CLINIC | Age: 62
End: 2022-05-02

## 2022-05-02 VITALS
BODY MASS INDEX: 25.92 KG/M2 | SYSTOLIC BLOOD PRESSURE: 125 MMHG | HEART RATE: 77 BPM | WEIGHT: 161.3 LBS | DIASTOLIC BLOOD PRESSURE: 80 MMHG | HEIGHT: 66 IN | OXYGEN SATURATION: 95 %

## 2022-05-02 DIAGNOSIS — F32.9 REACTIVE DEPRESSION: Primary | ICD-10-CM

## 2022-05-02 DIAGNOSIS — E11.9 TYPE 2 DIABETES MELLITUS WITHOUT COMPLICATION, WITHOUT LONG-TERM CURRENT USE OF INSULIN: ICD-10-CM

## 2022-05-02 DIAGNOSIS — E11.9 ENCOUNTER FOR DIABETIC FOOT EXAM: ICD-10-CM

## 2022-05-02 DIAGNOSIS — E78.2 MIXED HYPERLIPIDEMIA: ICD-10-CM

## 2022-05-02 DIAGNOSIS — F51.04 PSYCHOPHYSIOLOGICAL INSOMNIA: ICD-10-CM

## 2022-05-02 DIAGNOSIS — E55.9 VITAMIN D DEFICIENCY: ICD-10-CM

## 2022-05-02 DIAGNOSIS — Z00.00 ROUTINE HEALTH MAINTENANCE: ICD-10-CM

## 2022-05-02 PROCEDURE — 99214 OFFICE O/P EST MOD 30 MIN: CPT | Performed by: NURSE PRACTITIONER

## 2022-05-02 RX ORDER — PEN NEEDLE, DIABETIC 32GX 5/32"
1 NEEDLE, DISPOSABLE MISCELLANEOUS DAILY
Qty: 100 EACH | Refills: 3 | Status: SHIPPED | OUTPATIENT
Start: 2022-05-02

## 2022-05-02 RX ORDER — HYDROXYZINE HYDROCHLORIDE 25 MG/1
25 TABLET, FILM COATED ORAL NIGHTLY PRN
Qty: 90 TABLET | Refills: 1 | Status: SHIPPED | OUTPATIENT
Start: 2022-05-02

## 2022-05-02 NOTE — PROGRESS NOTES
Subjective   Dionne Bond is a 61 y.o. female presenting today for follow up of   Chief Complaint   Patient presents with   • Follow-up     6MFU   • Diabetes   • Hyperlipidemia   • Depression       History of Present Illness     Patient Active Problem List   Diagnosis   • Mixed hyperlipidemia   • Type 2 diabetes mellitus without complication (HCC)   • Vitamin D deficiency   • Depression   • Personal history of colonic polyps       Outpatient Medications Marked as Taking for the 5/2/22 encounter (Office Visit) with Linnette Izaguirre APRN   Medication Sig Dispense Refill   • aspirin 81 MG chewable tablet Chew 81 mg Daily.     • atorvastatin (LIPITOR) 40 MG tablet Take 1 tablet by mouth Every Night. 90 tablet 1   • cetirizine (zyrTEC) 10 MG tablet Take 10 mg by mouth Daily.     • Ergocalciferol 400 UNITS tablet Take 2 tablets by mouth Daily. 30 tablet 6   • glucose blood test strip 1 each by Other route Daily. Please dispense the test strips required w/ the patient's glucometer. 100 each 3   • glucose monitor monitoring kit 1 each Daily. Please dispense the glucometer preferred by the patient's insurance formulary. 1 each 0   • Insulin Pen Needle (BD Pen Needle Samara U/F) 32G X 4 MM misc 1 each Daily. 100 each 3   • metFORMIN (GLUCOPHAGE) 1000 MG tablet Take 1 tablet by mouth 2 (Two) Times a Day With Meals. 180 tablet 3   • ONE TOUCH LANCETS misc Use to check blood sugar 3 times per day for 3-4 days and then once per day 200 each 11   • sertraline (ZOLOFT) 100 MG tablet Take 1.5 tablets by mouth Daily. 135 tablet 3   • SITagliptin (Januvia) 100 MG tablet Take 1 tablet by mouth Daily. 90 tablet 3   • Victoza 18 MG/3ML solution pen-injector injection INJECT 1.8 MG  SUBCUTANEOUSLY INTO THE  APPROPRIATE AREA AS  DIRECTED DAILY 27 mL 3   • [DISCONTINUED] Insulin Pen Needle (BD Pen Needle Samara U/F) 32G X 4 MM misc 1 each Daily. 100 each 0     Mood - Interval hx has been very stressful. She has lost both her parents and  "both her dogs. Zoloft is working as well as it can for her. She is not sleeping well.    DM - Her current therapy includes metformin, Januvia, and Victoza. She tolerates this well.    HLD - She is c/w statin therapy.    The following portions of the patient's history were reviewed and updated as appropriate: allergies, current medications, past family history, past medical history, past social history, past surgical history and problem list.        Objective   Vitals:    05/02/22 0803   BP: 125/80   Pulse: 77   SpO2: 95%   Weight: 73.2 kg (161 lb 4.8 oz)   Height: 167.6 cm (66\")       BP Readings from Last 3 Encounters:   05/02/22 125/80   03/07/22 136/80   11/02/21 115/70        Wt Readings from Last 3 Encounters:   05/02/22 73.2 kg (161 lb 4.8 oz)   03/07/22 72.1 kg (159 lb)   11/02/21 77.7 kg (171 lb 6.4 oz)        Body mass index is 26.03 kg/m².  Nursing notes and vitals reviewed.    Physical Exam  Constitutional:       General: She is not in acute distress.     Appearance: She is well-developed.   Cardiovascular:      Rate and Rhythm: Regular rhythm.      Pulses:           Dorsalis pedis pulses are 2+ on the right side and 2+ on the left side.        Posterior tibial pulses are 2+ on the right side and 2+ on the left side.      Heart sounds: S1 normal and S2 normal.   Pulmonary:      Effort: Pulmonary effort is normal.      Breath sounds: Normal breath sounds.   Musculoskeletal:      Right foot: Normal range of motion. No deformity.      Left foot: Normal range of motion. No deformity.   Feet:      Right foot:      Protective Sensation: 5 sites tested. 5 sites sensed.      Skin integrity: Skin integrity normal.      Toenail Condition: Right toenails are normal.      Left foot:      Protective Sensation: 5 sites tested. 5 sites sensed.      Skin integrity: Skin integrity normal.      Toenail Condition: Left toenails are normal.   Neurological:      Mental Status: She is alert and oriented to person, place, and " time.   Psychiatric:         Attention and Perception: She is attentive.         Behavior: Behavior normal.         Thought Content: Thought content normal.         Recent Results (from the past 672 hour(s))   Lipid Panel With / Chol / HDL Ratio    Collection Time: 04/25/22  8:31 AM    Specimen: Blood   Result Value Ref Range    Total Cholesterol 194 100 - 199 mg/dL    Triglycerides 121 0 - 149 mg/dL    HDL Cholesterol 59 >39 mg/dL    VLDL Cholesterol Rashid 21 5 - 40 mg/dL    LDL Chol Calc (NIH) 114 (H) 0 - 99 mg/dL    Chol/HDL Ratio 3.3 0.0 - 4.4 ratio   Hemoglobin A1c    Collection Time: 04/25/22  8:31 AM    Specimen: Blood   Result Value Ref Range    Hemoglobin A1C 6.2 (H) 4.8 - 5.6 %   Comprehensive Metabolic Panel    Collection Time: 04/25/22  8:31 AM    Specimen: Blood   Result Value Ref Range    Glucose 91 65 - 99 mg/dL    BUN 14 8 - 27 mg/dL    Creatinine 0.83 0.57 - 1.00 mg/dL    EGFR Result 81 >59 mL/min/1.73    BUN/Creatinine Ratio 17 12 - 28    Sodium 141 134 - 144 mmol/L    Potassium 4.5 3.5 - 5.2 mmol/L    Chloride 103 96 - 106 mmol/L    Total CO2 22 20 - 29 mmol/L    Calcium 9.3 8.7 - 10.3 mg/dL    Total Protein 6.4 6.0 - 8.5 g/dL    Albumin 4.6 3.8 - 4.9 g/dL    Globulin 1.8 1.5 - 4.5 g/dL    A/G Ratio 2.6 (H) 1.2 - 2.2    Total Bilirubin 0.2 0.0 - 1.2 mg/dL    Alkaline Phosphatase 67 44 - 121 IU/L    AST (SGOT) 13 0 - 40 IU/L    ALT (SGPT) 11 0 - 32 IU/L         Assessment/Plan   Diagnoses and all orders for this visit:    1. Reactive depression (Primary)  Comments:  - stable    2. Psychophysiological insomnia  Comments:  - new  Orders:  -     hydrOXYzine (ATARAX) 25 MG tablet; Take 1 tablet by mouth At Night As Needed (insomina).  Dispense: 90 tablet; Refill: 1    3. Type 2 diabetes mellitus without complication, without long-term current use of insulin ([facility])  Comments:  - stable w/ A1c of 6.2  Orders:  -     Insulin Pen Needle (BD Pen Needle Samara U/F) 32G X 4 MM misc; 1 each Daily.  Dispense: 100  each; Refill: 3    4. Mixed hyperlipidemia  Comments:  - much improved w/ restart of statin  - not at goal of LDL <70  - no med change  - focus on TLCs    5. Encounter for diabetic foot exam (HCC)        Except as noted above, pt will continue current medications as noted in the medication list. I will continue to authorize refills as needed.        Medications, including side effects, were discussed with the patient. Patient verbalized understanding.  The plan of care was discussed. All questions were answered. Patient verbalized understanding.      Return in about 6 months (around 11/2/2022) for fasting labs one week prior to, Annual physical.

## 2022-05-06 DIAGNOSIS — E78.2 MIXED HYPERLIPIDEMIA: ICD-10-CM

## 2022-05-06 RX ORDER — ATORVASTATIN CALCIUM 40 MG/1
TABLET, FILM COATED ORAL
Qty: 90 TABLET | Refills: 3 | Status: SHIPPED | OUTPATIENT
Start: 2022-05-06

## 2022-06-21 ENCOUNTER — TELEPHONE (OUTPATIENT)
Dept: GASTROENTEROLOGY | Facility: CLINIC | Age: 62
End: 2022-06-21

## 2022-06-21 NOTE — TELEPHONE ENCOUNTER
Spoke with patient.  Scheduled at [place] 08/31/2022 for colonoscopy.  Need to reschedule due to scheduling change.    Scheduled at [place] 10/15/2022 at 11am - arrive 9:30am.  Will mail instructions.

## 2022-08-29 DIAGNOSIS — E11.9 TYPE 2 DIABETES MELLITUS WITHOUT COMPLICATION, WITHOUT LONG-TERM CURRENT USE OF INSULIN: ICD-10-CM

## 2022-08-30 RX ORDER — SITAGLIPTIN 100 MG/1
TABLET, FILM COATED ORAL
Qty: 90 TABLET | Refills: 3 | Status: SHIPPED | OUTPATIENT
Start: 2022-08-30

## 2022-10-12 DIAGNOSIS — F32.9 REACTIVE DEPRESSION: ICD-10-CM

## 2022-10-12 NOTE — TELEPHONE ENCOUNTER
Rx Refill Note  Requested Prescriptions     Pending Prescriptions Disp Refills    sertraline (ZOLOFT) 100 MG tablet [Pharmacy Med Name: Sertraline HCl 100 MG Oral Tablet] 135 tablet 3     Sig: TAKE 1 AND 1/2 TABLETS BY  MOUTH DAILY      Last office visit with prescribing clinician: 5/2/2022      Next office visit with prescribing clinician: 11/3/2022            Jennifer Karimi MA  10/12/22, 09:46 EDT

## 2022-10-13 RX ORDER — SERTRALINE HYDROCHLORIDE 100 MG/1
TABLET, FILM COATED ORAL
Qty: 135 TABLET | Refills: 3 | Status: SHIPPED | OUTPATIENT
Start: 2022-10-13

## 2022-10-14 ENCOUNTER — ANESTHESIA EVENT (OUTPATIENT)
Dept: PERIOP | Facility: HOSPITAL | Age: 62
End: 2022-10-14

## 2022-10-15 ENCOUNTER — ANESTHESIA (OUTPATIENT)
Dept: PERIOP | Facility: HOSPITAL | Age: 62
End: 2022-10-15

## 2022-10-15 ENCOUNTER — HOSPITAL ENCOUNTER (OUTPATIENT)
Facility: HOSPITAL | Age: 62
Setting detail: HOSPITAL OUTPATIENT SURGERY
Discharge: HOME | End: 2022-10-15
Attending: INTERNAL MEDICINE | Admitting: INTERNAL MEDICINE

## 2022-10-15 VITALS
HEART RATE: 66 BPM | WEIGHT: 162 LBS | OXYGEN SATURATION: 97 % | HEIGHT: 66 IN | SYSTOLIC BLOOD PRESSURE: 126 MMHG | DIASTOLIC BLOOD PRESSURE: 91 MMHG | RESPIRATION RATE: 17 BRPM | BODY MASS INDEX: 26.03 KG/M2 | TEMPERATURE: 98 F

## 2022-10-15 DIAGNOSIS — Z86.010 PERSONAL HISTORY OF COLONIC POLYPS: ICD-10-CM

## 2022-10-15 LAB — GLUCOSE BLDC GLUCOMTR-MCNC: 105 MG/DL (ref 70–130)

## 2022-10-15 PROCEDURE — 88305 TISSUE EXAM BY PATHOLOGIST: CPT | Performed by: INTERNAL MEDICINE

## 2022-10-15 PROCEDURE — 82962 GLUCOSE BLOOD TEST: CPT

## 2022-10-15 PROCEDURE — 45380 COLONOSCOPY AND BIOPSY: CPT | Performed by: INTERNAL MEDICINE

## 2022-10-15 PROCEDURE — 25010000002 PROPOFOL 10 MG/ML EMULSION: Performed by: NURSE ANESTHETIST, CERTIFIED REGISTERED

## 2022-10-15 RX ORDER — PROPOFOL 10 MG/ML
VIAL (ML) INTRAVENOUS AS NEEDED
Status: DISCONTINUED | OUTPATIENT
Start: 2022-10-15 | End: 2022-10-15 | Stop reason: SURG

## 2022-10-15 RX ORDER — SODIUM CHLORIDE, SODIUM LACTATE, POTASSIUM CHLORIDE, CALCIUM CHLORIDE 600; 310; 30; 20 MG/100ML; MG/100ML; MG/100ML; MG/100ML
100 INJECTION, SOLUTION INTRAVENOUS CONTINUOUS
Status: DISCONTINUED | OUTPATIENT
Start: 2022-10-15 | End: 2022-10-15 | Stop reason: HOSPADM

## 2022-10-15 RX ORDER — LIDOCAINE HYDROCHLORIDE 20 MG/ML
INJECTION, SOLUTION INFILTRATION; PERINEURAL AS NEEDED
Status: DISCONTINUED | OUTPATIENT
Start: 2022-10-15 | End: 2022-10-15 | Stop reason: SURG

## 2022-10-15 RX ORDER — LIDOCAINE HYDROCHLORIDE 10 MG/ML
0.5 INJECTION, SOLUTION EPIDURAL; INFILTRATION; INTRACAUDAL; PERINEURAL ONCE AS NEEDED
Status: DISCONTINUED | OUTPATIENT
Start: 2022-10-15 | End: 2022-10-15 | Stop reason: HOSPADM

## 2022-10-15 RX ORDER — SODIUM CHLORIDE 0.9 % (FLUSH) 0.9 %
10 SYRINGE (ML) INJECTION EVERY 12 HOURS SCHEDULED
Status: DISCONTINUED | OUTPATIENT
Start: 2022-10-15 | End: 2022-10-15 | Stop reason: HOSPADM

## 2022-10-15 RX ORDER — ONDANSETRON 2 MG/ML
4 INJECTION INTRAMUSCULAR; INTRAVENOUS ONCE AS NEEDED
Status: DISCONTINUED | OUTPATIENT
Start: 2022-10-15 | End: 2022-10-15 | Stop reason: HOSPADM

## 2022-10-15 RX ORDER — SODIUM CHLORIDE 0.9 % (FLUSH) 0.9 %
10 SYRINGE (ML) INJECTION AS NEEDED
Status: DISCONTINUED | OUTPATIENT
Start: 2022-10-15 | End: 2022-10-15 | Stop reason: HOSPADM

## 2022-10-15 RX ORDER — SODIUM CHLORIDE, SODIUM LACTATE, POTASSIUM CHLORIDE, CALCIUM CHLORIDE 600; 310; 30; 20 MG/100ML; MG/100ML; MG/100ML; MG/100ML
9 INJECTION, SOLUTION INTRAVENOUS CONTINUOUS
Status: DISCONTINUED | OUTPATIENT
Start: 2022-10-15 | End: 2022-10-15 | Stop reason: HOSPADM

## 2022-10-15 RX ORDER — SODIUM CHLORIDE 9 MG/ML
40 INJECTION, SOLUTION INTRAVENOUS AS NEEDED
Status: DISCONTINUED | OUTPATIENT
Start: 2022-10-15 | End: 2022-10-15 | Stop reason: HOSPADM

## 2022-10-15 RX ADMIN — LIDOCAINE HYDROCHLORIDE 100 MG: 20 INJECTION, SOLUTION INFILTRATION; PERINEURAL at 10:27

## 2022-10-15 RX ADMIN — PROPOFOL 50 MG: 10 INJECTION, EMULSION INTRAVENOUS at 10:43

## 2022-10-15 RX ADMIN — PROPOFOL 50 MG: 10 INJECTION, EMULSION INTRAVENOUS at 10:40

## 2022-10-15 RX ADMIN — PROPOFOL 50 MG: 10 INJECTION, EMULSION INTRAVENOUS at 10:34

## 2022-10-15 RX ADMIN — SODIUM CHLORIDE, POTASSIUM CHLORIDE, SODIUM LACTATE AND CALCIUM CHLORIDE: 600; 310; 30; 20 INJECTION, SOLUTION INTRAVENOUS at 10:24

## 2022-10-15 RX ADMIN — PROPOFOL 100 MG: 10 INJECTION, EMULSION INTRAVENOUS at 10:31

## 2022-10-15 RX ADMIN — PROPOFOL 50 MG: 10 INJECTION, EMULSION INTRAVENOUS at 10:46

## 2022-10-15 RX ADMIN — PROPOFOL 50 MG: 10 INJECTION, EMULSION INTRAVENOUS at 10:37

## 2022-10-15 NOTE — ANESTHESIA PREPROCEDURE EVALUATION
Anesthesia Evaluation     Patient summary reviewed and Nursing notes reviewed   no history of anesthetic complications:  NPO Solid Status: > 8 hours  NPO Liquid Status: > 8 hours           Airway   Mallampati: II  TM distance: >3 FB  Neck ROM: full  No difficulty expected  Dental - normal exam     Pulmonary - negative pulmonary ROS and normal exam    breath sounds clear to auscultation  Cardiovascular - normal exam  Exercise tolerance: good (4-7 METS)    Rhythm: regular  Rate: normal    (+) hyperlipidemia,       Neuro/Psych  (+) psychiatric history Anxiety and Depression,    GI/Hepatic/Renal/Endo    (+)   diabetes mellitus type 2 well controlled,   (-)  obesity, morbid obesity, no thyroid disorder    Musculoskeletal     Abdominal    Substance History   (+) alcohol use,      OB/GYN negative ob/gyn ROS         Other                        Anesthesia Plan    ASA 2     MAC     intravenous induction     Anesthetic plan, risks, benefits, and alternatives have been provided, discussed and informed consent has been obtained with: patient.    Use of blood products discussed with patient  Consented to blood products.       CODE STATUS:

## 2022-10-15 NOTE — H&P
Patient Care Team:  Linnette Izaguirre APRN as PCP - General (Family Medicine)  Malik Luciano MD as Consulting Physician (Obstetrics and Gynecology)    CHIEF COMPLAINT: Personal hx colon polyps    HISTORY OF PRESENT ILLNESS:  Last exam was 2018 by Dr Trinidad    Past Medical History:   Diagnosis Date   • Abscess of vulva 4/19/2018   • Depression    • Diabetes mellitus (HCC)    • Diastasis recti    • Hyperlipidemia    • Obesity      Past Surgical History:   Procedure Laterality Date   • COLONOSCOPY N/A 7/3/2018    Procedure: COLONOSCOPY with polypectomy;  Surgeon: Annette Trinidad MD;  Location: [facility];  Service: Gastroenterology   • DILATATION AND CURETTAGE  1990   • FOOT SURGERY Left 2013    Plantar filuroma   • KNEE ARTHROSCOPY Right 2010     Family History   Problem Relation Age of Onset   • Thyroid disease Mother    • Thyroid disease Sister    • Heart attack Maternal Grandmother    • Diabetes type II Maternal Grandfather    • Stroke Maternal Grandfather    • Heart attack Paternal Grandfather    • Thyroid disease Niece    • Colon cancer Neg Hx    • Colon polyps Neg Hx    • Breast cancer Neg Hx      Social History     Tobacco Use   • Smoking status: Never   • Smokeless tobacco: Never   Vaping Use   • Vaping Use: Never used   Substance Use Topics   • Alcohol use: Yes     Comment: occasionally   • Drug use: No     Medications Prior to Admission   Medication Sig Dispense Refill Last Dose   • aspirin 81 MG chewable tablet Chew 81 mg Daily.   Past Week   • atorvastatin (LIPITOR) 40 MG tablet TAKE 1 TABLET BY MOUTH AT  NIGHT 90 tablet 3 10/13/2022   • Ergocalciferol 400 UNITS tablet Take 2 tablets by mouth Daily. 30 tablet 6 Past Week   • Januvia 100 MG tablet TAKE 1 TABLET BY MOUTH  DAILY 90 tablet 3 10/13/2022   • metFORMIN (GLUCOPHAGE) 1000 MG tablet TAKE 1 TABLET BY MOUTH  TWICE DAILY WITH MEALS 180 tablet 3 10/13/2022   • sertraline (ZOLOFT) 100 MG tablet TAKE 1 AND 1/2 TABLETS BY  MOUTH DAILY 135  "tablet 3 10/13/2022   • Victoza 18 MG/3ML solution pen-injector injection INJECT 1.8 MG  SUBCUTANEOUSLY INTO THE  APPROPRIATE AREA AS  DIRECTED DAILY 27 mL 3 10/13/2022   • cetirizine (zyrTEC) 10 MG tablet Take 10 mg by mouth Daily.   More than a month   • glucose blood test strip 1 each by Other route Daily. Please dispense the test strips required w/ the patient's glucometer. 100 each 3    • glucose monitor monitoring kit 1 each Daily. Please dispense the glucometer preferred by the patient's insurance formulary. 1 each 0    • hydrOXYzine (ATARAX) 25 MG tablet Take 1 tablet by mouth At Night As Needed (insomina). 90 tablet 1 Unknown   • Insulin Pen Needle (BD Pen Needle Samara U/F) 32G X 4 MM misc 1 each Daily. 100 each 3    • ONE TOUCH LANCETS misc Use to check blood sugar 3 times per day for 3-4 days and then once per day 200 each 11      Allergies:  Patient has no known allergies.    REVIEW OF SYSTEMS:  Please see the above history of present illness for pertinent positives and negatives.  The remainder of the patient's systems have been reviewed and are negative.     Vital Signs  Temp:  [97 °F (36.1 °C)] 97 °F (36.1 °C)  Heart Rate:  [70] 70  Resp:  [16] 16  BP: (136)/(81) 136/81    Flowsheet Rows    Flowsheet Row First Filed Value   Admission Height 167.6 cm (66\") Documented at 10/14/2022 1502   Admission Weight 73.5 kg (162 lb) Documented at 10/14/2022 1502           Physical Exam:  Physical Exam   Constitutional: Patient appears well-developed and well-nourished and in no acute distress   HEENT:   Head: Normocephalic and atraumatic.   Eyes:  Pupils are equal, round, and reactive to light. EOM are intact. Sclerae are anicteric and non-injected.  Mouth and Throat: Patient has moist mucous membranes. Oropharynx is clear of any erythema or exudate.     Neck: Neck supple. No JVD present. No thyromegaly present. No lymphadenopathy present.  Cardiovascular: Regular rate, regular rhythm, S1 normal and S2 normal.  " Exam reveals no gallop and no friction rub.  No murmur heard.  Pulmonary/Chest: Lungs are clear to auscultation bilaterally. No respiratory distress. No wheezes. No rhonchi. No rales.   Abdominal: Soft. Bowel sounds are normal. No distension and no mass. There is no hepatosplenomegaly. There is no tenderness.   Musculoskeletal: Normal Muscle tone  Extremities: No edema. Pulses are palpable in all 4 extremities.  Neurological: Patient is alert and oriented to person, place, and time. Cranial nerves II-XII are grossly intact with no focal deficits.  Skin: Skin is warm. No rash noted. Nails show no clubbing.  No cyanosis or erythema.    Debilities/Disabilities Identified: None  Emotional Behavior: Appropriate     Results Review:   I reviewed the patient's new clinical results.    Lab Results (most recent)     Procedure Component Value Units Date/Time    POC Glucose Once [[id number]]  (Normal) Collected: 10/15/22 0955    Specimen: Blood Updated: 10/15/22 1002     Glucose 105 mg/dL      Comment: Meter: [id number] : [id number] Michael Gilmore RN             Imaging Results (Most Recent)     None        reviewed    ECG/EMG Results (most recent)     None        reviewed    Assessment & Plan   Personal hx colon polyps/  colonoscopy      I discussed the patient's findings and my recommendations with patient.     Harley Gregory MD  10/15/22  10:20 EDT    Time: 10 min prior to procedure.

## 2022-10-15 NOTE — DISCHARGE INSTRUCTIONS
Colonoscopy, Adult, Care After  This sheet gives you information about how to care for yourself after your procedure. Your doctor may also give you more specific instructions. If you have problems or questions, call your doctor.  What can I expect after the procedure?  After the procedure, it is common to have:  A small amount of blood in your poop for 24 hours.  Some gas.  Mild cramping or bloating in your belly.  Follow these instructions at home:  General instructions    ***************DO NOT DRIVE TODAY*******************    For the first 24 hours after the procedure:  Do not sign important documents.  Do not drink alcohol.  Do your daily activities more slowly than normal.  Eat foods that are soft and easy to digest.  Take over-the-counter or prescription medicines only as told by your doctor.  To help cramping and bloating:       Try walking around.  Put heat on your belly (abdomen) as told by your doctor. Use a heat source that your doctor recommends, such as a moist heat pack or a heating pad.  Put a towel between your skin and the heat source.  Leave the heat on for 20-30 minutes.  Remove the heat if your skin turns bright red. This is especially important if you cannot feel pain, heat, or cold. You can get burned.  Eating and drinking   Monitored Anesthesia Care, Care After  These instructions provide you with information about caring for yourself after your procedure. Your health care provider may also give you more specific instructions. Your treatment has been planned according to current medical practices, but problems sometimes occur. Call your health care provider if you have any problems or questions after your procedure.  What can I expect after the procedure?  After your procedure, you may:  Feel sleepy for several hours.  Feel clumsy and have poor balance for several hours.  Feel forgetful about what happened after the procedure.  Have poor judgment for several hours.  Feel nauseous or  vomit.  Have a sore throat if you had a breathing tube during the procedure.  Follow these instructions at home:    *****************NO DRIVING TODAY****************************    For at least 24 hours after the procedure:             Have a responsible adult stay with you. It is important to have someone help care for you until you are awake and alert.  Rest as needed.  Do not:  Participate in activities in which you could fall or become injured.  Use heavy machinery.  Drink alcohol.  Take sleeping pills or medicines that cause drowsiness.  Make important decisions or sign legal documents.  Take care of children on your own.  Eating and drinking  Follow the diet that is recommended by your health care provider.  If you vomit, drink water, juice, or soup when you can drink without vomiting.  Make sure you have little or no nausea before eating solid foods.  General instructions  Take over-the-counter and prescription medicines only as told by your health care provider.  If you have sleep apnea, surgery and certain medicines can increase your risk for breathing problems. Follow instructions from your health care provider about wearing your sleep device:  Anytime you are sleeping, including during daytime naps.  While taking prescription pain medicines, sleeping medicines, or medicines that make you drowsy.  If you smoke, do not smoke without supervision.  Keep all follow-up visits as told by your health care provider. This is important.  Contact a health care provider if:  You keep feeling nauseous or you keep vomiting.  You feel light-headed.  You develop a rash.  You have a fever.  Get help right away if:  You have trouble breathing.  Summary  For several hours after your procedure, you may feel sleepy and have poor judgment.  Have a responsible adult stay with you for at least 24 hours or until you are awake and alert.  This information is not intended to replace advice given to you by your health care provider.  Make sure you discuss any questions you have with your health care provider.  Document Released: 04/09/2017 Document Revised: 08/03/2018 Document Reviewed: 04/09/2017  Elsevier Interactive Patient Education © 2019 [employer]

## 2022-10-15 NOTE — BRIEF OP NOTE
COLONOSCOPY WITH POLYPECTOMY  Progress Note    Dionne Bond  10/15/2022    Pre-op Diagnosis:   Personal history of colonic polyps [Z86.010]       Post-Op Diagnosis Codes:     * Personal history of colonic polyps [Z86.010]     * Colon polyp [K63.5]    Procedure/CPT® Codes:        Procedure(s):  COLONOSCOPY WITH POLYPECTOMY        Surgeon(s):  Harley Gregory MD Overstreet, Stephen Kaster, MD    Anesthesia: Monitored Anesthesia Care    Staff:   Circulator: Ángela Lindsay RN  Scrub Person: Jennifer Grady         Estimated Blood Loss: none    Urine Voided: * No values recorded between 10/15/2022 10:25 AM and 10/15/2022 10:48 AM *    Specimens:                Specimens     ID Source Type Tests Collected By Collected At Frozen?    A Large Intestine, Transverse Colon Polyp · TISSUE PATHOLOGY EXAM   Harley Gregory MD 10/15/22 1044                 Drains: * No LDAs found *    Findings: Colon to TI POOR PREP  Inflammatory polyp-Biopsy        Complications: None          Harley Gregory MD     Date: 10/15/2022  Time: 10:50 EDT

## 2022-10-15 NOTE — ANESTHESIA POSTPROCEDURE EVALUATION
Patient: Dionne Bond    Procedure Summary     Date: 10/15/22 Room / Location: [facility] ENDOSCOPY 1 /  [facility] OR    Anesthesia Start: 1024 Anesthesia Stop: 1050    Procedure: COLONOSCOPY WITH POLYPECTOMY Diagnosis:       Personal history of colonic polyps      Colon polyp      (Personal history of colonic polyps [Z86.010])    Surgeons: Harley Gregory MD Provider: Sun Cope CRNA    Anesthesia Type: MAC ASA Status: 2          Anesthesia Type: MAC    Vitals  Vitals Value Taken Time   /91 10/15/22 1110   Temp 98 °F (36.7 °C) 10/15/22 1054   Pulse 66 10/15/22 1110   Resp 17 10/15/22 1110   SpO2 97 % 10/15/22 1110           Post Anesthesia Care and Evaluation    Patient location during evaluation: PHASE II  Patient participation: complete - patient participated  Level of consciousness: awake and alert  Pain score: 0  Pain management: satisfactory to patient    Airway patency: patent  Anesthetic complications: No anesthetic complications  PONV Status: none  Cardiovascular status: acceptable  Respiratory status: acceptable  Hydration status: acceptable

## 2022-10-18 LAB
LAB AP CASE REPORT: NORMAL
LAB AP DIAGNOSIS COMMENT: NORMAL
PATH REPORT.FINAL DX SPEC: NORMAL
PATH REPORT.GROSS SPEC: NORMAL

## 2022-10-21 DIAGNOSIS — E78.2 MIXED HYPERLIPIDEMIA: ICD-10-CM

## 2022-10-21 DIAGNOSIS — E11.9 TYPE 2 DIABETES MELLITUS WITHOUT COMPLICATION, WITHOUT LONG-TERM CURRENT USE OF INSULIN: ICD-10-CM

## 2022-10-21 DIAGNOSIS — E55.9 VITAMIN D DEFICIENCY: ICD-10-CM

## 2022-10-21 DIAGNOSIS — Z00.00 ROUTINE HEALTH MAINTENANCE: ICD-10-CM

## 2022-10-25 LAB
25(OH)D3+25(OH)D2 SERPL-MCNC: 32.9 NG/ML (ref 30–100)
ALBUMIN SERPL-MCNC: 4.6 G/DL (ref 3.5–5.2)
ALBUMIN/GLOB SERPL: 2.1 G/DL
ALP SERPL-CCNC: 75 U/L (ref 39–117)
ALT SERPL-CCNC: 12 U/L (ref 1–33)
AST SERPL-CCNC: 16 U/L (ref 1–32)
BILIRUB SERPL-MCNC: 0.3 MG/DL (ref 0–1.2)
BUN SERPL-MCNC: 14 MG/DL (ref 8–23)
BUN/CREAT SERPL: 16.1 (ref 7–25)
CALCIUM SERPL-MCNC: 9.6 MG/DL (ref 8.6–10.5)
CHLORIDE SERPL-SCNC: 105 MMOL/L (ref 98–107)
CHOLEST SERPL-MCNC: 191 MG/DL (ref 0–200)
CHOLEST/HDLC SERPL: 2.69 {RATIO}
CO2 SERPL-SCNC: 28.5 MMOL/L (ref 22–29)
CREAT SERPL-MCNC: 0.87 MG/DL (ref 0.57–1)
EGFRCR SERPLBLD CKD-EPI 2021: 76.4 ML/MIN/1.73
ERYTHROCYTE [DISTWIDTH] IN BLOOD BY AUTOMATED COUNT: 13.5 % (ref 12.3–15.4)
GLOBULIN SER CALC-MCNC: 2.2 GM/DL
GLUCOSE SERPL-MCNC: 94 MG/DL (ref 65–99)
HBA1C MFR BLD: 6 % (ref 4.8–5.6)
HCT VFR BLD AUTO: 40.7 % (ref 34–46.6)
HDLC SERPL-MCNC: 71 MG/DL (ref 40–60)
HGB BLD-MCNC: 13.4 G/DL (ref 12–15.9)
LDLC SERPL CALC-MCNC: 100 MG/DL (ref 0–100)
MCH RBC QN AUTO: 29.3 PG (ref 26.6–33)
MCHC RBC AUTO-ENTMCNC: 32.9 G/DL (ref 31.5–35.7)
MCV RBC AUTO: 89.1 FL (ref 79–97)
PLATELET # BLD AUTO: 251 10*3/MM3 (ref 140–450)
POTASSIUM SERPL-SCNC: 4.1 MMOL/L (ref 3.5–5.2)
PROT SERPL-MCNC: 6.8 G/DL (ref 6–8.5)
RBC # BLD AUTO: 4.57 10*6/MM3 (ref 3.77–5.28)
SODIUM SERPL-SCNC: 142 MMOL/L (ref 136–145)
TRIGL SERPL-MCNC: 113 MG/DL (ref 0–150)
TSH SERPL DL<=0.005 MIU/L-ACNC: 1.42 UIU/ML (ref 0.27–4.2)
VLDLC SERPL CALC-MCNC: 20 MG/DL (ref 5–40)
WBC # BLD AUTO: 6.73 10*3/MM3 (ref 3.4–10.8)

## 2022-11-03 ENCOUNTER — OFFICE VISIT (OUTPATIENT)
Dept: INTERNAL MEDICINE | Facility: CLINIC | Age: 62
End: 2022-11-03

## 2022-11-03 VITALS
SYSTOLIC BLOOD PRESSURE: 104 MMHG | DIASTOLIC BLOOD PRESSURE: 62 MMHG | BODY MASS INDEX: 26.74 KG/M2 | WEIGHT: 166.4 LBS | OXYGEN SATURATION: 99 % | HEIGHT: 66 IN | HEART RATE: 72 BPM | TEMPERATURE: 97.7 F

## 2022-11-03 DIAGNOSIS — Z00.00 ENCOUNTER FOR WELLNESS EXAMINATION IN ADULT: Primary | ICD-10-CM

## 2022-11-03 DIAGNOSIS — E78.2 MIXED HYPERLIPIDEMIA: ICD-10-CM

## 2022-11-03 DIAGNOSIS — Z23 NEED FOR INFLUENZA VACCINATION: ICD-10-CM

## 2022-11-03 DIAGNOSIS — M25.552 LEFT HIP PAIN: ICD-10-CM

## 2022-11-03 DIAGNOSIS — Z23 NEED FOR PNEUMOCOCCAL VACCINATION: ICD-10-CM

## 2022-11-03 DIAGNOSIS — F32.9 REACTIVE DEPRESSION: ICD-10-CM

## 2022-11-03 DIAGNOSIS — G89.29 CHRONIC RIGHT SHOULDER PAIN: ICD-10-CM

## 2022-11-03 DIAGNOSIS — E11.9 TYPE 2 DIABETES MELLITUS WITHOUT COMPLICATION, WITHOUT LONG-TERM CURRENT USE OF INSULIN: ICD-10-CM

## 2022-11-03 DIAGNOSIS — E55.9 VITAMIN D DEFICIENCY: ICD-10-CM

## 2022-11-03 DIAGNOSIS — M25.511 CHRONIC RIGHT SHOULDER PAIN: ICD-10-CM

## 2022-11-03 PROCEDURE — 90471 IMMUNIZATION ADMIN: CPT | Performed by: NURSE PRACTITIONER

## 2022-11-03 PROCEDURE — 90677 PCV20 VACCINE IM: CPT | Performed by: NURSE PRACTITIONER

## 2022-11-03 PROCEDURE — 99396 PREV VISIT EST AGE 40-64: CPT | Performed by: NURSE PRACTITIONER

## 2022-11-03 PROCEDURE — 90472 IMMUNIZATION ADMIN EACH ADD: CPT | Performed by: NURSE PRACTITIONER

## 2022-11-03 PROCEDURE — 90686 IIV4 VACC NO PRSV 0.5 ML IM: CPT | Performed by: NURSE PRACTITIONER

## 2022-11-03 NOTE — PROGRESS NOTES
SUBJECTIVE    Dionne is a 61 y.o. female presenting for Annual Exam    Her current/chronic health conditions include:  Patient Active Problem List   Diagnosis   • Mixed hyperlipidemia   • Type 2 diabetes mellitus without complication (HCC)   • Vitamin D deficiency   • Depression   • Personal history of colonic polyps       Outpatient Medications Marked as Taking for the 11/3/22 encounter (Office Visit) with Linnette Izaguirre APRN   Medication Sig Dispense Refill   • atorvastatin (LIPITOR) 40 MG tablet TAKE 1 TABLET BY MOUTH AT  NIGHT 90 tablet 3   • Ergocalciferol 400 UNITS tablet Take 2 tablets by mouth Daily. 30 tablet 6   • glucose blood test strip 1 each by Other route Daily. Please dispense the test strips required w/ the patient's glucometer. 100 each 3   • glucose monitor monitoring kit 1 each Daily. Please dispense the glucometer preferred by the patient's insurance formulary. 1 each 0   • hydrOXYzine (ATARAX) 25 MG tablet Take 1 tablet by mouth At Night As Needed (insomina). 90 tablet 1   • Insulin Pen Needle (BD Pen Needle Samara U/F) 32G X 4 MM misc 1 each Daily. 100 each 3   • Januvia 100 MG tablet TAKE 1 TABLET BY MOUTH  DAILY 90 tablet 3   • metFORMIN (GLUCOPHAGE) 1000 MG tablet TAKE 1 TABLET BY MOUTH  TWICE DAILY WITH MEALS 180 tablet 3   • ONE TOUCH LANCETS misc Use to check blood sugar 3 times per day for 3-4 days and then once per day 200 each 11   • sertraline (ZOLOFT) 100 MG tablet TAKE 1 AND 1/2 TABLETS BY  MOUTH DAILY 135 tablet 3   • Victoza 18 MG/3ML solution pen-injector injection INJECT 1.8 MG  SUBCUTANEOUSLY INTO THE  APPROPRIATE AREA AS  DIRECTED DAILY 27 mL 3     Mood - Interval hx has been very stressful. She has lost both her mother-in-law and her step-mother-in-law and her  lost his job d/t age dicrimination. Zoloft is working as well for her. Hydroxyzine provides excellent relief from insomnia.     DM - Her current therapy includes metformin, Januvia, and Victoza. She  "tolerates this well. Frequent eye exams over this past year w/ [facility] in [place].     HLD - She is c/w statin therapy.    Screenings:  PAP: up to date per GYN   Mammogram: ordered by GYN, was due this summer but was too busy to scshedule. Has contact info and encouraged to schedule this.  Dexa: n/a  Colon Cancer: CLS 10/2022; procedure note and path report reviewed  Tob use: n/a      Complaints today include:  She notes worsening pain in her R shoulder and L hip. She takes Ibuprofen.    The following portions of the patient's history were reviewed and updated as appropriate: allergies, current medications, problem list, past medical history, past family history, and past social history.        OBJECTIVE    Vitals:    11/03/22 0954   BP: 104/62   BP Location: Left arm   Pulse: 72   Temp: 97.7 °F (36.5 °C)   TempSrc: Infrared   SpO2: 99%   Weight: 75.5 kg (166 lb 6.4 oz)   Height: 167.6 cm (66\")       BP Readings from Last 3 Encounters:   11/03/22 104/62   10/15/22 126/91   07/25/22 114/78        Wt Readings from Last 3 Encounters:   11/03/22 75.5 kg (166 lb 6.4 oz)   10/14/22 73.5 kg (162 lb)   07/25/22 72.6 kg (160 lb)        Body mass index is 26.86 kg/m².  Nursing notes and vital signs reviewed.      Physical Exam  Constitutional:       General: She is not in acute distress.     Appearance: Normal appearance. She is well-developed.   HENT:      Head: Normocephalic.      Right Ear: Hearing, tympanic membrane, ear canal and external ear normal.      Left Ear: Hearing, tympanic membrane, ear canal and external ear normal.      Nose: Nose normal. No mucosal edema or rhinorrhea.      Mouth/Throat:      Mouth: Mucous membranes are moist.      Pharynx: Oropharynx is clear. Uvula midline.   Eyes:      General: Lids are normal.      Extraocular Movements: Extraocular movements intact.      Conjunctiva/sclera: Conjunctivae normal.      Pupils: Pupils are equal, round, and reactive to light.   Neck:      Thyroid: " No thyroid mass or thyromegaly.   Cardiovascular:      Rate and Rhythm: Regular rhythm.      Pulses: Normal pulses.      Heart sounds: S1 normal and S2 normal. No murmur heard.    No friction rub. No gallop.   Pulmonary:      Effort: Pulmonary effort is normal.      Breath sounds: Normal breath sounds. No wheezing, rhonchi or rales.   Abdominal:      General: Bowel sounds are normal.      Palpations: Abdomen is soft.      Tenderness: There is no abdominal tenderness. There is no guarding.      Hernia: No hernia is present.   Musculoskeletal:         General: No deformity. Normal range of motion.      Cervical back: Normal range of motion and neck supple.   Lymphadenopathy:      Cervical: No cervical adenopathy.   Skin:     General: Skin is warm and dry.      Findings: No lesion or rash.   Neurological:      General: No focal deficit present.      Mental Status: She is alert and oriented to person, place, and time.      Cranial Nerves: No cranial nerve deficit.      Sensory: No sensory deficit.      Motor: Motor function is intact.      Coordination: Coordination is intact.      Gait: Gait normal.      Deep Tendon Reflexes: Reflexes are normal and symmetric.   Psychiatric:         Attention and Perception: She is attentive.         Mood and Affect: Mood and affect normal.         Speech: Speech normal.         Behavior: Behavior normal.         Thought Content: Thought content normal.         Recent Results (from the past 672 hour(s))   POC Glucose Once    Collection Time: 10/15/22  9:55 AM    Specimen: Blood   Result Value Ref Range    Glucose 105 70 - 130 mg/dL   Tissue Pathology Exam    Collection Time: 10/15/22 10:44 AM    Specimen: Large Intestine, Transverse Colon; Polyp   Result Value Ref Range    Case Report       Surgical Pathology Report                         Case: [id number]                                  Authorizing Provider:  Harley Gregory        Collected:           10/15/2022 10:44 AM                                  MD Tim                                                                   Ordering Location:     [facility]   Received:            10/15/2022 03:40 PM                                 OR                                                                           Pathologist:           Bob Granado MD                                                         Specimen:    Large Intestine, Transverse Colon                                                          Final Diagnosis       1. Transverse Colon Biopsy:   A. Focal active inflammation with normal crypt architecture.   B. Focal active cryptitis with early crypt abscess formation noted, no well developed granulomatous inflammation        identified.    jat/razm       Comment       Biopsy changes show active inflammation. The findings could be seen with infectious colitis or possible focal early ischemic colitis. Crypt distortion typically seen with chronicity is not well developed in this biopsy specimen. Clinical correlation is required.    [email or web address]       Gross Description       1. Large Intestine, Transverse Colon.  The specimen is received in formalin labeled with the patient's name and further designated 'transverse colon biopsy' are multiple small fragments of gray-tan tissue. The specimen is submitted for embedding as received.       Vitamin D 25 Hydroxy    Collection Time: 10/24/22  8:36 AM    Specimen: Blood   Result Value Ref Range    25 Hydroxy, Vitamin D 32.9 30.0 - 100.0 ng/ml   TSH    Collection Time: 10/24/22  8:36 AM    Specimen: Blood   Result Value Ref Range    TSH 1.420 0.270 - 4.200 uIU/mL   Lipid Panel With / Chol / HDL Ratio    Collection Time: 10/24/22  8:36 AM    Specimen: Blood   Result Value Ref Range    Total Cholesterol 191 0 - 200 mg/dL    Triglycerides 113 0 - 150 mg/dL    HDL Cholesterol 71 (H) 40 - 60 mg/dL    VLDL Cholesterol Rashid 20 5 - 40 mg/dL    LDL Chol Calc (NIH) 100 0 - 100 mg/dL     Chol/HDL Ratio 2.69    Hemoglobin A1c    Collection Time: 10/24/22  8:36 AM    Specimen: Blood   Result Value Ref Range    Hemoglobin A1C 6.00 (H) 4.80 - 5.60 %   Comprehensive Metabolic Panel    Collection Time: 10/24/22  8:36 AM    Specimen: Blood   Result Value Ref Range    Glucose 94 65 - 99 mg/dL    BUN 14 8 - 23 mg/dL    Creatinine 0.87 0.57 - 1.00 mg/dL    EGFR Result 76.4 >60.0 mL/min/1.73    BUN/Creatinine Ratio 16.1 7.0 - 25.0    Sodium 142 136 - 145 mmol/L    Potassium 4.1 3.5 - 5.2 mmol/L    Chloride 105 98 - 107 mmol/L    Total CO2 28.5 22.0 - 29.0 mmol/L    Calcium 9.6 8.6 - 10.5 mg/dL    Total Protein 6.8 6.0 - 8.5 g/dL    Albumin 4.60 3.50 - 5.20 g/dL    Globulin 2.2 gm/dL    A/G Ratio 2.1 g/dL    Total Bilirubin 0.3 0.0 - 1.2 mg/dL    Alkaline Phosphatase 75 39 - 117 U/L    AST (SGOT) 16 1 - 32 U/L    ALT (SGPT) 12 1 - 33 U/L   CBC (No Diff)    Collection Time: 10/24/22  8:36 AM    Specimen: Blood   Result Value Ref Range    WBC 6.73 3.40 - 10.80 10*3/mm3    RBC 4.57 3.77 - 5.28 10*6/mm3    Hemoglobin 13.4 12.0 - 15.9 g/dL    Hematocrit 40.7 34.0 - 46.6 %    MCV 89.1 79.0 - 97.0 fL    MCH 29.3 26.6 - 33.0 pg    MCHC 32.9 31.5 - 35.7 g/dL    RDW 13.5 12.3 - 15.4 %    Platelets 251 140 - 450 10*3/mm3         ASSESSMENT AND PLAN    Diagnoses and all orders for this visit:    1. Encounter for wellness examination in adult (Primary)    2. Need for influenza vaccination  -     FluLaval/Fluzone >6 mos ([id number])    3. Need for pneumococcal vaccination  -     Pneumococcal Conjugate Vaccine 20-Valent All    4. Type 2 diabetes mellitus without complication, without long-term current use of insulin (HCC)   - controlled w/ A1c down from 6.2 to 6.0    5. Mixed hyperlipidemia   - significantly improved from 6mo prior    6. Reactive depression   - controlled    7. Vitamin D deficiency   - controlled    8. Left hip pain   - discussed referral to PT. Pt would like to hold off until after the first of the year d/t  insurance changes.    9. Chronic right shoulder pain   - discussed referral to PT. Pt would like to hold off until after the first of the year d/t insurance changes.      Preventative counseling completed including relevant screenings, appropriate vaccinations, healthy nutrition, and appropriate physical activity. Age-appropriate Screening & Interventions recommended by USPSTF were reviewed with the patient, and Health Maintenance was updated in Epic.  BMI is >= 25 and <30. (Overweight) The following options were offered after discussion;: exercise counseling/recommendations and nutrition counseling/recommendations      Except as noted above, pt will continue current medications as noted in the medication list. I will continue to authorize refills as needed.      Medications, including side effects, were discussed with the patient. Patient verbalized understanding.  The plan of care was discussed. All questions were answered. Patient verbalized understanding.        Return in about 6 months (around 5/3/2023) for fasting labs one week prior to., or sooner if needed.

## 2023-01-24 NOTE — PROGRESS NOTES
RM:________    Referral Provider: No ref. provider found Linnette Izaguirre APRN    NEW PATIENT/ CONSULT  PREVIOUS CARDIOLOGIST:   CARDIAC TESTING:     : 1960   AGE: 62 y.o.    2023  REASON FOR VISIT/  CC:      WT: ____________ BP: __________L __________R/ HR_______________    CHEST PAIN: _____________    SOA: ____________PALPS: __________      LIGHTHEADED: ___________ FATIGUE: _______________ EDEMA______________  ALLERGIES:  Patient has no known allergies.  SMOKING HISTORY  Social History     Tobacco Use   • Smoking status: Never   • Smokeless tobacco: Never   Vaping Use   • Vaping Use: Never used   Substance Use Topics   • Alcohol use: Yes     Comment: occasionally   • Drug use: No          CHILDREN: _______________________       CAFFEINE USE________  ALCOHOL _____________  OCCUPATION_____________  Past Surgical History:   Procedure Laterality Date   • COLONOSCOPY N/A 7/3/2018    Procedure: COLONOSCOPY with polypectomy;  Surgeon: Annette Trinidad MD;  [facility];  Service: Gastroenterology   • COLONOSCOPY W/ POLYPECTOMY N/A 10/15/2022    Procedure: COLONOSCOPY WITH POLYPECTOMY;  Surgeon: Harley Gregory MD;  Location: [facility];  Service: Gastroenterology;  Laterality: N/A;  polyp  poor prep   • DILATATION AND CURETTAGE     • FOOT SURGERY Left     Plantar filuroma   • KNEE ARTHROSCOPY Right                 FAMILY HISTORY  HEART DISEASE  CHF  DIABETES  CARDIAC ARREST  STROKE  CANCER  ANEURYSM                                                             H/O: MI_____   STROKE________   GOUT_____   ANEMIA______     CAROTID________ HIV____ CAD_______ HYPERCHOL _____    H/O: CHF _____   RF____ DM___ HTN_______PVD____THYROID DISEASE_______    PMH: GI ____   HEPATITIS ___ KIDNEY DISEASE ___ LUNG DISEASE _______     SLEEP APNEA ____ BLOOD CLOTS ____ DVT ____ VEIN STRIPPING ___________     CANCER _________________________________ CHEMO/ RADIATION__________

## 2023-01-31 ENCOUNTER — OFFICE VISIT (OUTPATIENT)
Dept: CARDIOLOGY | Facility: CLINIC | Age: 63
End: 2023-01-31
Payer: COMMERCIAL

## 2023-01-31 VITALS
HEIGHT: 66 IN | WEIGHT: 169.1 LBS | SYSTOLIC BLOOD PRESSURE: 124 MMHG | DIASTOLIC BLOOD PRESSURE: 66 MMHG | HEART RATE: 75 BPM | BODY MASS INDEX: 27.18 KG/M2

## 2023-01-31 DIAGNOSIS — R00.2 PALPITATIONS: Primary | ICD-10-CM

## 2023-01-31 PROCEDURE — 93000 ELECTROCARDIOGRAM COMPLETE: CPT | Performed by: INTERNAL MEDICINE

## 2023-01-31 PROCEDURE — 99204 OFFICE O/P NEW MOD 45 MIN: CPT | Performed by: INTERNAL MEDICINE

## 2023-01-31 NOTE — PROGRESS NOTES
Date of Office Visit: 23  Encounter Provider: Von Richards MD  Place of Service: [facility] CARDIOLOGY  Patient Name: Dionne Bond  :1960    Chief Complaint   Patient presents with   • Palpitations   :     HPI:     Ms. Bond is 62 y.o. and presents today for evaluation of palpitations.    We evaluated her in 2016 for exercise intolerance.  A treadmill stress test and an echocardiogram were normal.    She has been doing extremely well.  She exercises regularly.  She does drink a fairly significant amount of caffeine, including coffee, iced tea, and cola every day.  She was recently diagnosed with ADHD and she did start a stimulant, but she had to stop it as it was causing her palpitations to worsen.  She describes palpitations at rest that feel like a hard skipped beat.  When it happens, she feels like she has to cough.  She has not had sustained symptoms or a rapid heartbeat.  She does not have exertional chest pain or shortness of breath.  She denies leg swelling, orthopnea, or PND.      Past Medical History:   Diagnosis Date   • Abscess of vulva 2018   • Depression    • Diastasis recti    • Hyperlipidemia    • Obesity    • Osteoarthritis    • Type 2 diabetes mellitus without complication (HCC) 2016   • Vitamin D deficiency 2017       Past Surgical History:   Procedure Laterality Date   • COLONOSCOPY N/A 7/3/2018    Procedure: COLONOSCOPY with polypectomy;  Surgeon: Annette Trinidad MD;  Location: [facility] OR;  Service: Gastroenterology   • COLONOSCOPY W/ POLYPECTOMY N/A 10/15/2022    Procedure: COLONOSCOPY WITH POLYPECTOMY;  Surgeon: Harley Gregory MD;  Location: [facility] OR;  Service: Gastroenterology;  Laterality: N/A;  polyp  poor prep   • DILATATION AND CURETTAGE     • FOOT SURGERY Left     Plantar filuroma   • KNEE ARTHROSCOPY Right        Social History     Socioeconomic History   • Marital status:    Tobacco Use   •  Smoking status: Never   • Smokeless tobacco: Never   Vaping Use   • Vaping Use: Never used   Substance and Sexual Activity   • Alcohol use: Yes     Comment: occasionally   • Drug use: No   • Sexual activity: Yes     Partners: Male       Family History   Problem Relation Age of Onset   • Thyroid disease Mother    • Alzheimer's disease Mother    • Heart failure Father    • Thyroid disease Sister    • Heart attack Maternal Grandmother    • Diabetes type II Maternal Grandfather    • Stroke Maternal Grandfather    • Heart attack Paternal Grandfather    • Thyroid disease Niece    • Colon cancer Neg Hx    • Colon polyps Neg Hx    • Breast cancer Neg Hx        Review of Systems   Cardiovascular: Positive for palpitations.   Musculoskeletal: Positive for myalgias.   Gastrointestinal: Positive for constipation.   Neurological: Positive for headaches.   All other systems reviewed and are negative.      No Known Allergies      Current Outpatient Medications:   •  atorvastatin (LIPITOR) 40 MG tablet, TAKE 1 TABLET BY MOUTH AT  NIGHT, Disp: 90 tablet, Rfl: 3  •  Ergocalciferol 400 UNITS tablet, Take 2 tablets by mouth Daily. (Patient taking differently: Take 1 tablet by mouth Daily.), Disp: 30 tablet, Rfl: 6  •  glucose blood test strip, 1 each by Other route Daily. Please dispense the test strips required w/ the patient's glucometer., Disp: 100 each, Rfl: 3  •  glucose monitor monitoring kit, 1 each Daily. Please dispense the glucometer preferred by the patient's insurance formulary., Disp: 1 each, Rfl: 0  •  Insulin Pen Needle (BD Pen Needle Samara U/F) 32G X 4 MM misc, 1 each Daily., Disp: 100 each, Rfl: 3  •  Januvia 100 MG tablet, TAKE 1 TABLET BY MOUTH  DAILY, Disp: 90 tablet, Rfl: 3  •  metFORMIN (GLUCOPHAGE) 1000 MG tablet, TAKE 1 TABLET BY MOUTH  TWICE DAILY WITH MEALS, Disp: 180 tablet, Rfl: 3  •  ONE TOUCH LANCETS misc, Use to check blood sugar 3 times per day for 3-4 days and then once per day, Disp: 200 each, Rfl:  "11  •  sertraline (ZOLOFT) 100 MG tablet, TAKE 1 AND 1/2 TABLETS BY  MOUTH DAILY, Disp: 135 tablet, Rfl: 3  •  Victoza 18 MG/3ML solution pen-injector injection, INJECT 1.8 MG  SUBCUTANEOUSLY INTO THE  APPROPRIATE AREA AS  DIRECTED DAILY, Disp: 27 mL, Rfl: 3  •  cetirizine (zyrTEC) 10 MG tablet, Take 10 mg by mouth Daily., Disp: , Rfl:   •  hydrOXYzine (ATARAX) 25 MG tablet, Take 1 tablet by mouth At Night As Needed (insomina)., Disp: 90 tablet, Rfl: 1      Objective:     Vitals:    01/31/23 1411   BP: 124/66   BP Location: Right arm   Pulse: 75   Weight: 76.7 kg (169 lb 1.6 oz)   Height: 167.6 cm (66\")     Body mass index is 27.29 kg/m².    Vitals reviewed.   Constitutional:       Appearance: Healthy appearance. Well-developed and not in distress.   Eyes:      Conjunctiva/sclera: Conjunctivae normal.   HENT:      Head: Normocephalic.      Nose: Nose normal.         Comments: masked  Neck:      Vascular: No JVD. JVD normal.      Lymphadenopathy: No cervical adenopathy.   Pulmonary:      Effort: Pulmonary effort is normal.      Breath sounds: Normal breath sounds.   Cardiovascular:      Normal rate. Regular rhythm.      Murmurs: There is no murmur.   Pulses:     Intact distal pulses.   Edema:     Peripheral edema absent.   Abdominal:      Palpations: Abdomen is soft.      Tenderness: There is no abdominal tenderness.   Musculoskeletal: Normal range of motion.      Cervical back: Normal range of motion. Skin:     General: Skin is warm and dry.      Findings: No rash.   Neurological:      General: No focal deficit present.      Mental Status: Alert and oriented to person, place, and time.      Cranial Nerves: No cranial nerve deficit.   Psychiatric:         Behavior: Behavior normal.         Thought Content: Thought content normal.         Judgment: Judgment normal.           ECG 12 Lead    Date/Time: 1/31/2023 2:33 PM  Performed by: Von Richards MD  Authorized by: Von Richards MD   Comparison: compared with " previous ECG   Similar to previous ECG  Rhythm: sinus rhythm  Conduction: conduction normal  ST Segments: ST segments normal  T Waves: T waves normal  QRS axis: normal  Other: no other findings    Clinical impression: normal ECG              Assessment:       Diagnosis Plan   1. Palpitations  ECG 12 Lead    Adult Transthoracic Echo Complete W/ Cont if Necessary Per Protocol             Plan:       Her palpitations are quite consistent with premature ventricular contractions by description.  Her EKG and her cardiac exam are normal.  She recently had labs drawn which were unremarkable, including a TSH.  I am going to get an echocardiogram, and as long as that is normal, I feel that the most appropriate treatments will be reassurance, decreased caffeine intake, and a trial of oral magnesium oxide, 400 mg daily.  She does report constipation, headaches, and muscle cramps, and I do think that the magnesium is going to improve her symptoms significantly.    If her echocardiogram is normal, we will see her as needed.    Sincerely,       Von Richards MD

## 2023-05-11 DIAGNOSIS — E11.9 TYPE 2 DIABETES MELLITUS WITHOUT COMPLICATION, WITHOUT LONG-TERM CURRENT USE OF INSULIN: ICD-10-CM

## 2023-05-11 DIAGNOSIS — E55.9 VITAMIN D DEFICIENCY: ICD-10-CM

## 2023-05-11 DIAGNOSIS — E78.2 MIXED HYPERLIPIDEMIA: ICD-10-CM

## 2023-05-11 DIAGNOSIS — F51.04 PSYCHOPHYSIOLOGICAL INSOMNIA: ICD-10-CM

## 2023-05-11 DIAGNOSIS — F32.9 REACTIVE DEPRESSION: ICD-10-CM

## 2023-05-11 RX ORDER — CETIRIZINE HYDROCHLORIDE 10 MG/1
10 TABLET ORAL DAILY
Qty: 30 TABLET | Refills: 3 | Status: SHIPPED | OUTPATIENT
Start: 2023-05-11

## 2023-05-11 RX ORDER — HYDROXYZINE HYDROCHLORIDE 25 MG/1
25 TABLET, FILM COATED ORAL NIGHTLY PRN
Qty: 90 TABLET | Refills: 1 | Status: SHIPPED | OUTPATIENT
Start: 2023-05-11

## 2023-05-11 RX ORDER — SERTRALINE HYDROCHLORIDE 100 MG/1
150 TABLET, FILM COATED ORAL DAILY
Qty: 135 TABLET | Refills: 3 | Status: SHIPPED | OUTPATIENT
Start: 2023-05-11

## 2023-05-11 RX ORDER — ATORVASTATIN CALCIUM 40 MG/1
TABLET, FILM COATED ORAL
Qty: 90 TABLET | Refills: 3 | Status: SHIPPED | OUTPATIENT
Start: 2023-05-11 | End: 2023-05-11 | Stop reason: SDUPTHER

## 2023-05-11 RX ORDER — PEN NEEDLE, DIABETIC 32GX 5/32"
NEEDLE, DISPOSABLE MISCELLANEOUS
Qty: 90 EACH | Refills: 3 | Status: SHIPPED | OUTPATIENT
Start: 2023-05-11

## 2023-05-11 RX ORDER — ATORVASTATIN CALCIUM 40 MG/1
40 TABLET, FILM COATED ORAL
Qty: 90 TABLET | Refills: 3 | Status: SHIPPED | OUTPATIENT
Start: 2023-05-11

## 2023-05-11 NOTE — TELEPHONE ENCOUNTER
Rx Refill Note  Requested Prescriptions     Pending Prescriptions Disp Refills   • atorvastatin (LIPITOR) 40 MG tablet 90 tablet 3     Sig: Take 1 tablet by mouth every night at bedtime.   • cetirizine (zyrTEC) 10 MG tablet 30 tablet 3     Sig: Take 1 tablet by mouth Daily.   • Ergocalciferol 10 MCG (400 UNIT) tablet 30 tablet 6     Sig: Take 2 tablets by mouth Daily.   • hydrOXYzine (ATARAX) 25 MG tablet 90 tablet 1     Sig: Take 1 tablet by mouth At Night As Needed (insomina).   • SITagliptin (Januvia) 100 MG tablet 90 tablet 3     Sig: Take 1 tablet by mouth Daily.   • metFORMIN (GLUCOPHAGE) 1000 MG tablet 180 tablet 3     Sig: Take 1 tablet by mouth 2 (Two) Times a Day With Meals.   • sertraline (ZOLOFT) 100 MG tablet 135 tablet 3     Sig: Take 1.5 tablets by mouth Daily.      Last office visit with prescribing clinician: 11/3/2022   Last telemedicine visit with prescribing clinician: 5/11/2023   Next office visit with prescribing clinician: 6/1/2023                         Would you like a call back once the refill request has been completed: [] Yes [] No    If the office needs to give you a call back, can they leave a voicemail: [] Yes [] No    Lei Martinez, PCT  05/11/23, 13:58 EDT

## 2023-05-16 DIAGNOSIS — E11.9 TYPE 2 DIABETES MELLITUS WITHOUT COMPLICATION, WITHOUT LONG-TERM CURRENT USE OF INSULIN: ICD-10-CM

## 2023-06-01 ENCOUNTER — OFFICE VISIT (OUTPATIENT)
Dept: INTERNAL MEDICINE | Facility: CLINIC | Age: 63
End: 2023-06-01

## 2023-06-01 VITALS
BODY MASS INDEX: 27.51 KG/M2 | TEMPERATURE: 96.8 F | WEIGHT: 171.2 LBS | HEART RATE: 84 BPM | OXYGEN SATURATION: 98 % | SYSTOLIC BLOOD PRESSURE: 124 MMHG | HEIGHT: 66 IN | DIASTOLIC BLOOD PRESSURE: 86 MMHG

## 2023-06-01 DIAGNOSIS — F51.04 PSYCHOPHYSIOLOGICAL INSOMNIA: ICD-10-CM

## 2023-06-01 DIAGNOSIS — F32.9 REACTIVE DEPRESSION: ICD-10-CM

## 2023-06-01 DIAGNOSIS — E78.2 MIXED HYPERLIPIDEMIA: ICD-10-CM

## 2023-06-01 DIAGNOSIS — E11.9 ENCOUNTER FOR DIABETIC FOOT EXAM: ICD-10-CM

## 2023-06-01 DIAGNOSIS — E11.9 TYPE 2 DIABETES MELLITUS WITHOUT COMPLICATION, WITHOUT LONG-TERM CURRENT USE OF INSULIN: Primary | ICD-10-CM

## 2023-06-01 DIAGNOSIS — Z12.31 ENCOUNTER FOR SCREENING MAMMOGRAM FOR MALIGNANT NEOPLASM OF BREAST: ICD-10-CM

## 2023-06-01 NOTE — PROGRESS NOTES
Subjective   Dionne Bond is a 62 y.o. female presenting today for follow up of   Chief Complaint   Patient presents with   • Follow-up     6 month F/U   • Hyperlipidemia   • Diabetes       History of Present Illness     Outpatient Medications Marked as Taking for the 6/1/23 encounter (Office Visit) with Linnette Izaguirre APRN   Medication Sig Dispense Refill   • atorvastatin (LIPITOR) 40 MG tablet Take 1 tablet by mouth every night at bedtime. 90 tablet 3   • cetirizine (zyrTEC) 10 MG tablet Take 1 tablet by mouth Daily. 30 tablet 3   • Ergocalciferol 10 MCG (400 UNIT) tablet Take 2 tablets by mouth Daily. 90 tablet 3   • hydrOXYzine (ATARAX) 25 MG tablet Take 1 tablet by mouth At Night As Needed (insomina). 90 tablet 1   • metFORMIN (GLUCOPHAGE) 1000 MG tablet Take 1 tablet by mouth 2 (Two) Times a Day With Meals. 180 tablet 3   • sertraline (ZOLOFT) 100 MG tablet Take 1.5 tablets by mouth Daily. 135 tablet 3   • SITagliptin (Januvia) 100 MG tablet Take 1 tablet by mouth Daily. 90 tablet 3   • Victoza 18 MG/3ML solution pen-injector injection INJECT 1.8 MG  SUBCUTANEOUSLY INTO THE  APPROPRIATE AREA AS  DIRECTED DAILY 27 mL 3       Mood - Interval hx has been stressful. Her  lost his job d/t age discrimination, and they are in the middle of a law suit. She lost her long-time pet. Zoloft is working as well for her. Hydroxyzine provides excellent relief from insomnia. She had attempted to wean down Zoloft over the past 6mo. This did not go well and she has returned to her full dosage.     DM - Her current therapy includes metformin, Januvia, and Victoza. She tolerates this well. Her eye exam is scheduled for next week w/ [facility] in [place].     HLD - She is c/w statin therapy.      The following portions of the patient's history were reviewed and updated as appropriate: allergies, current medications, past family history, past medical history, past social history, past surgical history and  "problem list.        Objective   Vitals:    06/01/23 1523   BP: 124/86   BP Location: Left arm   Patient Position: Sitting   Cuff Size: Adult   Pulse: 84   Temp: 96.8 °F (36 °C)   TempSrc: Infrared   SpO2: 98%   Weight: 77.7 kg (171 lb 3.2 oz)   Height: 167.6 cm (66\")       BP Readings from Last 3 Encounters:   06/01/23 124/86   01/31/23 124/66   11/03/22 104/62        Wt Readings from Last 3 Encounters:   06/01/23 77.7 kg (171 lb 3.2 oz)   01/31/23 76.7 kg (169 lb 1.6 oz)   11/03/22 75.5 kg (166 lb 6.4 oz)        Body mass index is 27.63 kg/m².  Nursing notes and vitals reviewed.    Physical Exam  Constitutional:       General: She is not in acute distress.     Appearance: She is well-developed.   Cardiovascular:      Rate and Rhythm: Regular rhythm.      Pulses:           Dorsalis pedis pulses are 2+ on the right side and 2+ on the left side.        Posterior tibial pulses are 2+ on the right side and 2+ on the left side.      Heart sounds: S1 normal and S2 normal.   Pulmonary:      Effort: Pulmonary effort is normal.      Breath sounds: Normal breath sounds.   Musculoskeletal:      Right foot: Normal range of motion. No deformity.      Left foot: Normal range of motion. No deformity.   Feet:      Right foot:      Protective Sensation: 5 sites tested. 5 sites sensed.      Skin integrity: Skin integrity normal.      Toenail Condition: Right toenails are normal.      Left foot:      Protective Sensation: 5 sites tested. 5 sites sensed.      Skin integrity: Skin integrity normal.      Toenail Condition: Left toenails are normal.   Neurological:      Mental Status: She is alert and oriented to person, place, and time.   Psychiatric:         Attention and Perception: She is attentive.         Behavior: Behavior normal.         Thought Content: Thought content normal.         No results found for this or any previous visit (from the past 672 hour(s)).      Assessment & Plan   Diagnoses and all orders for this " visit:    1. Type 2 diabetes mellitus without complication, without long-term current use of insulin (HCC) (Primary)    2. Mixed hyperlipidemia    3. Reactive depression    4. Psychophysiological insomnia    5. Encounter for screening mammogram for malignant neoplasm of breast  -     Mammo Screening Digital Tomosynthesis Bilateral With CAD; Future    6. Encounter for diabetic foot exam        Except as noted above, pt will continue current medications as noted in the medication list. I will continue to authorize refills as needed.      Medications, including side effects, were discussed with the patient. Patient verbalized understanding.  The plan of care was discussed. All questions were answered. Patient verbalized understanding.      Return in about 6 months (around 12/1/2023) for fasting labs ASAP, fasting labs one week prior to, Annual physical.

## 2023-06-02 DIAGNOSIS — E11.9 TYPE 2 DIABETES MELLITUS WITHOUT COMPLICATION, WITHOUT LONG-TERM CURRENT USE OF INSULIN: Primary | ICD-10-CM

## 2023-06-02 RX ORDER — SEMAGLUTIDE 1.34 MG/ML
1 INJECTION, SOLUTION SUBCUTANEOUS WEEKLY
Qty: 3 ML | Refills: 4 | Status: SHIPPED | OUTPATIENT
Start: 2023-06-24

## 2023-11-15 ENCOUNTER — TELEPHONE (OUTPATIENT)
Dept: INTERNAL MEDICINE | Facility: CLINIC | Age: 63
End: 2023-11-15
Payer: COMMERCIAL

## 2023-11-16 DIAGNOSIS — E55.9 VITAMIN D DEFICIENCY: ICD-10-CM

## 2023-11-16 DIAGNOSIS — E78.2 MIXED HYPERLIPIDEMIA: Primary | ICD-10-CM

## 2023-11-16 DIAGNOSIS — E11.9 TYPE 2 DIABETES MELLITUS WITHOUT COMPLICATION, WITHOUT LONG-TERM CURRENT USE OF INSULIN: ICD-10-CM

## 2023-11-22 DIAGNOSIS — E11.9 TYPE 2 DIABETES MELLITUS WITHOUT COMPLICATION, WITHOUT LONG-TERM CURRENT USE OF INSULIN: ICD-10-CM

## 2023-11-22 DIAGNOSIS — E78.2 MIXED HYPERLIPIDEMIA: ICD-10-CM

## 2023-11-22 DIAGNOSIS — E55.9 VITAMIN D DEFICIENCY: ICD-10-CM

## 2023-11-28 LAB
25(OH)D3+25(OH)D2 SERPL-MCNC: 35.9 NG/ML (ref 30–100)
ALBUMIN SERPL-MCNC: 4.8 G/DL (ref 3.5–5.2)
ALBUMIN/CREAT UR: 8 MG/G CREAT (ref 0–29)
ALBUMIN/GLOB SERPL: 2.5 G/DL
ALP SERPL-CCNC: 74 U/L (ref 39–117)
ALT SERPL-CCNC: 13 U/L (ref 1–33)
APPEARANCE UR: CLEAR
AST SERPL-CCNC: 19 U/L (ref 1–32)
BILIRUB SERPL-MCNC: 0.3 MG/DL (ref 0–1.2)
BILIRUB UR QL STRIP: NEGATIVE
BUN SERPL-MCNC: 10 MG/DL (ref 8–23)
BUN/CREAT SERPL: 14.3 (ref 7–25)
CALCIUM SERPL-MCNC: 9.6 MG/DL (ref 8.6–10.5)
CHLORIDE SERPL-SCNC: 102 MMOL/L (ref 98–107)
CHOLEST SERPL-MCNC: 194 MG/DL (ref 0–200)
CHOLEST/HDLC SERPL: 2.69 {RATIO}
CO2 SERPL-SCNC: 27.7 MMOL/L (ref 22–29)
COLOR UR: YELLOW
CREAT SERPL-MCNC: 0.7 MG/DL (ref 0.57–1)
CREAT UR-MCNC: 41.6 MG/DL
EGFRCR SERPLBLD CKD-EPI 2021: 98.5 ML/MIN/1.73
ERYTHROCYTE [DISTWIDTH] IN BLOOD BY AUTOMATED COUNT: 13.1 % (ref 12.3–15.4)
GLOBULIN SER CALC-MCNC: 1.9 GM/DL
GLUCOSE SERPL-MCNC: 95 MG/DL (ref 65–99)
GLUCOSE UR QL STRIP: NEGATIVE
HBA1C MFR BLD: 6.2 % (ref 4.8–5.6)
HCT VFR BLD AUTO: 39.5 % (ref 34–46.6)
HDLC SERPL-MCNC: 72 MG/DL (ref 40–60)
HGB BLD-MCNC: 13 G/DL (ref 12–15.9)
HGB UR QL STRIP: NEGATIVE
KETONES UR QL STRIP: NEGATIVE
LDLC SERPL CALC-MCNC: 107 MG/DL (ref 0–100)
LEUKOCYTE ESTERASE UR QL STRIP: ABNORMAL
MCH RBC QN AUTO: 29.1 PG (ref 26.6–33)
MCHC RBC AUTO-ENTMCNC: 32.9 G/DL (ref 31.5–35.7)
MCV RBC AUTO: 88.6 FL (ref 79–97)
MICROALBUMIN UR-MCNC: 3.3 UG/ML
NITRITE UR QL STRIP: NEGATIVE
PH UR STRIP: 6.5 [PH] (ref 5–8)
PLATELET # BLD AUTO: 240 10*3/MM3 (ref 140–450)
POTASSIUM SERPL-SCNC: 4.2 MMOL/L (ref 3.5–5.2)
PROT SERPL-MCNC: 6.7 G/DL (ref 6–8.5)
PROT UR QL STRIP: NEGATIVE
RBC # BLD AUTO: 4.46 10*6/MM3 (ref 3.77–5.28)
SODIUM SERPL-SCNC: 139 MMOL/L (ref 136–145)
SP GR UR STRIP: 1.01 (ref 1–1.03)
TRIGL SERPL-MCNC: 81 MG/DL (ref 0–150)
TSH SERPL DL<=0.005 MIU/L-ACNC: 1.34 UIU/ML (ref 0.27–4.2)
UROBILINOGEN UR STRIP-MCNC: ABNORMAL MG/DL
VLDLC SERPL CALC-MCNC: 15 MG/DL (ref 5–40)
WBC # BLD AUTO: 5.19 10*3/MM3 (ref 3.4–10.8)

## 2023-12-28 ENCOUNTER — OFFICE VISIT (OUTPATIENT)
Dept: INTERNAL MEDICINE | Facility: CLINIC | Age: 63
End: 2023-12-28
Payer: COMMERCIAL

## 2023-12-28 VITALS
DIASTOLIC BLOOD PRESSURE: 82 MMHG | HEART RATE: 86 BPM | HEIGHT: 66 IN | OXYGEN SATURATION: 95 % | TEMPERATURE: 97.1 F | BODY MASS INDEX: 24.4 KG/M2 | WEIGHT: 151.8 LBS | SYSTOLIC BLOOD PRESSURE: 118 MMHG

## 2023-12-28 DIAGNOSIS — F32.9 REACTIVE DEPRESSION: ICD-10-CM

## 2023-12-28 DIAGNOSIS — M25.512 CHRONIC LEFT SHOULDER PAIN: ICD-10-CM

## 2023-12-28 DIAGNOSIS — E55.9 VITAMIN D DEFICIENCY: ICD-10-CM

## 2023-12-28 DIAGNOSIS — E11.9 TYPE 2 DIABETES MELLITUS WITHOUT COMPLICATION, WITHOUT LONG-TERM CURRENT USE OF INSULIN: ICD-10-CM

## 2023-12-28 DIAGNOSIS — Z00.00 ENCOUNTER FOR WELLNESS EXAMINATION IN ADULT: Primary | ICD-10-CM

## 2023-12-28 DIAGNOSIS — E78.2 MIXED HYPERLIPIDEMIA: ICD-10-CM

## 2023-12-28 DIAGNOSIS — M25.511 CHRONIC RIGHT SHOULDER PAIN: ICD-10-CM

## 2023-12-28 DIAGNOSIS — G89.29 CHRONIC RIGHT SHOULDER PAIN: ICD-10-CM

## 2023-12-28 DIAGNOSIS — G89.29 CHRONIC LEFT SHOULDER PAIN: ICD-10-CM

## 2023-12-28 RX ORDER — SEMAGLUTIDE 1.34 MG/ML
1 INJECTION, SOLUTION SUBCUTANEOUS WEEKLY
Qty: 9 ML | Refills: 4 | Status: SHIPPED | OUTPATIENT
Start: 2023-12-28

## 2023-12-28 NOTE — PROGRESS NOTES
SUBJECTIVE    Dionne is a 63 y.o. female presenting for Annual Exam    Her current/chronic health conditions include:  Patient Active Problem List   Diagnosis    Mixed hyperlipidemia    Type 2 diabetes mellitus without complication    Vitamin D deficiency    Depression    Personal history of colonic polyps       Outpatient Medications Marked as Taking for the 12/28/23 encounter (Office Visit) with Linnette Izaguirre APRN   Medication Sig Dispense Refill    atorvastatin (LIPITOR) 40 MG tablet Take 1 tablet by mouth every night at bedtime. 90 tablet 3    cetirizine (zyrTEC) 10 MG tablet Take 1 tablet by mouth Daily. 30 tablet 3    Ergocalciferol 10 MCG (400 UNIT) tablet Take 2 tablets by mouth Daily. 90 tablet 3    hydrOXYzine (ATARAX) 25 MG tablet Take 1 tablet by mouth At Night As Needed (insomina). 90 tablet 1    metFORMIN (GLUCOPHAGE) 1000 MG tablet Take 1 tablet by mouth 2 (Two) Times a Day With Meals. 180 tablet 3    oxyCODONE-acetaminophen (PERCOCET) 5-325 MG per tablet Take 1 tablet by mouth Every 6 (Six) Hours.      Semaglutide, 1 MG/DOSE, (Ozempic, 1 MG/DOSE,) 4 MG/3ML solution pen-injector Inject 1 mg under the skin into the appropriate area as directed 1 (One) Time Per Week. 3 mL 4    sertraline (ZOLOFT) 100 MG tablet Take 1.5 tablets by mouth Daily. 135 tablet 3    SITagliptin (Januvia) 100 MG tablet Take 1 tablet by mouth Daily. 90 tablet 3         Health Habits:  Nutrition: eating less; inadvertently fallen into intermittent fasting  Exercise: 7 times/week. Walking her dog but would like to get back to more strength and notes arthritis affecting her shoulders and L hip.    Screenings:  PAP: UTD per GYN  Mammogram: negative 06/2023  Dexa: n/a  Colon Cancer: CLS 10/2022 w/ 3yr recall  Tob use: n/a      Mood - things have come together and are good right now    DM - doing very well w/ change to Ozempic. Eye exam 07/2023.    HLD - no issues w/ statin      The following portions of the patient's history  "were reviewed and updated as appropriate: allergies, current medications, problem list, past medical history, past family history, and past social history.    Review of Systems   Constitutional: Negative.    HENT: Negative.     Eyes: Negative.    Respiratory: Negative.     Cardiovascular: Negative.    Gastrointestinal: Negative.    Endocrine: Negative.    Genitourinary: Negative.    Musculoskeletal:  Positive for arthralgias.   Skin: Negative.    Allergic/Immunologic: Negative.    Neurological: Negative.    Hematological: Negative.    Psychiatric/Behavioral: Negative.         OBJECTIVE    Vitals:    12/28/23 0957   BP: 118/82   BP Location: Left arm   Patient Position: Sitting   Cuff Size: Adult   Pulse: 86   Temp: 97.1 °F (36.2 °C)   TempSrc: Infrared   SpO2: 95%   Weight: 68.9 kg (151 lb 12.8 oz)   Height: 167.6 cm (66\")       BP Readings from Last 3 Encounters:   12/28/23 118/82   09/22/23 145/89   06/01/23 124/86        Wt Readings from Last 3 Encounters:   12/28/23 68.9 kg (151 lb 12.8 oz)   09/22/23 70.3 kg (155 lb)   06/01/23 77.7 kg (171 lb 3.2 oz)        Body mass index is 24.5 kg/m².  Nursing notes and vital signs reviewed.      Physical Exam  Constitutional:       General: She is not in acute distress.     Appearance: Normal appearance. She is well-developed.   HENT:      Head: Normocephalic.      Right Ear: Hearing, tympanic membrane, ear canal and external ear normal.      Left Ear: Hearing, tympanic membrane, ear canal and external ear normal.      Nose: Nose normal. No mucosal edema or rhinorrhea.      Mouth/Throat:      Mouth: Mucous membranes are moist.      Pharynx: Oropharynx is clear. Uvula midline.   Eyes:      General: Lids are normal.      Extraocular Movements: Extraocular movements intact.      Conjunctiva/sclera: Conjunctivae normal.      Pupils: Pupils are equal, round, and reactive to light.   Neck:      Thyroid: No thyroid mass or thyromegaly.   Cardiovascular:      Rate and Rhythm: " Regular rhythm.      Pulses: Normal pulses.      Heart sounds: S1 normal and S2 normal. No murmur heard.     No friction rub. No gallop.   Pulmonary:      Effort: Pulmonary effort is normal.      Breath sounds: Normal breath sounds. No wheezing, rhonchi or rales.   Abdominal:      General: Bowel sounds are normal.      Palpations: Abdomen is soft.      Tenderness: There is no abdominal tenderness. There is no guarding.      Hernia: No hernia is present.   Musculoskeletal:         General: No deformity. Normal range of motion.      Cervical back: Normal range of motion and neck supple.   Lymphadenopathy:      Cervical: No cervical adenopathy.   Skin:     General: Skin is warm and dry.      Findings: No lesion or rash.   Neurological:      General: No focal deficit present.      Mental Status: She is alert and oriented to person, place, and time.      Cranial Nerves: No cranial nerve deficit.      Sensory: No sensory deficit.      Motor: Motor function is intact.      Coordination: Coordination is intact.      Gait: Gait normal.      Deep Tendon Reflexes: Reflexes are normal and symmetric.   Psychiatric:         Attention and Perception: She is attentive.         Mood and Affect: Mood and affect normal.         Speech: Speech normal.         Behavior: Behavior normal.         Thought Content: Thought content normal.         Recent Results (from the past 3360 hour(s))   POC Urinalysis Dipstick, Multipro (Automated Dipstick)    Collection Time: 09/22/23  7:51 PM    Specimen: Urine   Result Value Ref Range    Color Yellow     Clarity, UA Clear     Glucose, UA Negative mg/dL    Bilirubin Negative     Ketones, UA Negative     Specific Gravity  1.005 1.005 - 1.030    Blood, UA Trace (A)     pH, Urine 5.5 5.0 - 8.0    Protein, POC Negative mg/dL    Urobilinogen, UA 0.2 E.U./dL     Nitrite, UA Negative     Leukocytes Trace (A)    Urine Culture - Urine, Urine, Clean Catch    Collection Time: 09/22/23  7:54 PM    Specimen:  Urine, Clean Catch   Result Value Ref Range    Urine Culture Final report (A)     Result 1 Comment (A)     Susceptibility Testing Comment    Microalbumin / Creatinine Urine Ratio - Urine, Clean Catch    Collection Time: 11/27/23  9:59 AM    Specimen: Urine, Clean Catch   Result Value Ref Range    Creatinine, Urine 41.6 Not Estab. mg/dL    Microalbumin, Urine 3.3 Not Estab. ug/mL    Microalbumin/Creatinine Ratio 8 0 - 29 mg/g creat   Urinalysis without microscopic (no culture) - Urine, Clean Catch    Collection Time: 11/27/23  9:59 AM    Specimen: Urine, Clean Catch   Result Value Ref Range    Specific Gravity, UA 1.009 1.005 - 1.030    pH, UA 6.5 5.0 - 8.0    Color, UA Yellow     Appearance, UA Clear Clear    Leukocytes, UA Trace (A) Negative    Protein Negative Negative    Glucose, UA Negative Negative    Ketones Negative Negative    Blood, UA Negative Negative    Bilirubin, UA Negative Negative    Urobilinogen, UA Comment     Nitrite, UA Negative Negative   Vitamin D,25-Hydroxy    Collection Time: 11/27/23  9:59 AM    Specimen: Blood   Result Value Ref Range    25 Hydroxy, Vitamin D 35.9 30.0 - 100.0 ng/ml   TSH    Collection Time: 11/27/23  9:59 AM    Specimen: Blood   Result Value Ref Range    TSH 1.340 0.270 - 4.200 uIU/mL   Lipid Panel With / Chol / HDL Ratio    Collection Time: 11/27/23  9:59 AM    Specimen: Blood   Result Value Ref Range    Total Cholesterol 194 0 - 200 mg/dL    Triglycerides 81 0 - 150 mg/dL    HDL Cholesterol 72 (H) 40 - 60 mg/dL    VLDL Cholesterol Rashid 15 5 - 40 mg/dL    LDL Chol Calc (NIH) 107 (H) 0 - 100 mg/dL    Chol/HDL Ratio 2.69    Hemoglobin A1c    Collection Time: 11/27/23  9:59 AM    Specimen: Blood   Result Value Ref Range    Hemoglobin A1C 6.20 (H) 4.80 - 5.60 %   Comprehensive Metabolic Panel    Collection Time: 11/27/23  9:59 AM    Specimen: Blood   Result Value Ref Range    Glucose 95 65 - 99 mg/dL    BUN 10 8 - 23 mg/dL    Creatinine 0.70 0.57 - 1.00 mg/dL    EGFR Result  98.5 >60.0 mL/min/1.73    BUN/Creatinine Ratio 14.3 7.0 - 25.0    Sodium 139 136 - 145 mmol/L    Potassium 4.2 3.5 - 5.2 mmol/L    Chloride 102 98 - 107 mmol/L    Total CO2 27.7 22.0 - 29.0 mmol/L    Calcium 9.6 8.6 - 10.5 mg/dL    Total Protein 6.7 6.0 - 8.5 g/dL    Albumin 4.8 3.5 - 5.2 g/dL    Globulin 1.9 gm/dL    A/G Ratio 2.5 g/dL    Total Bilirubin 0.3 0.0 - 1.2 mg/dL    Alkaline Phosphatase 74 39 - 117 U/L    AST (SGOT) 19 1 - 32 U/L    ALT (SGPT) 13 1 - 33 U/L   CBC (No Diff)    Collection Time: 11/27/23  9:59 AM    Specimen: Blood   Result Value Ref Range    WBC 5.19 3.40 - 10.80 10*3/mm3    RBC 4.46 3.77 - 5.28 10*6/mm3    Hemoglobin 13.0 12.0 - 15.9 g/dL    Hematocrit 39.5 34.0 - 46.6 %    MCV 88.6 79.0 - 97.0 fL    MCH 29.1 26.6 - 33.0 pg    MCHC 32.9 31.5 - 35.7 g/dL    RDW 13.1 12.3 - 15.4 %    Platelets 240 140 - 450 10*3/mm3           ASSESSMENT AND PLAN    Diagnoses and all orders for this visit:    1. Encounter for wellness examination in adult (Primary)    2. Type 2 diabetes mellitus without complication, without long-term current use of insulin  -     Semaglutide, 1 MG/DOSE, (Ozempic, 1 MG/DOSE,) 4 MG/3ML solution pen-injector; Inject 1 mg under the skin into the appropriate area as directed 1 (One) Time Per Week.  Dispense: 9 mL; Refill: 4    3. Mixed hyperlipidemia    4. Vitamin D deficiency    5. Reactive depression    6. Chronic left shoulder pain  -     XR shoulder 2+ vw left  -     Ambulatory Referral to Physical Therapy    7. Chronic right shoulder pain  -     XR Shoulder 2+ View Right  -     Ambulatory Referral to Physical Therapy        #1. Preventative counseling completed including relevant screenings, appropriate vaccinations, healthy nutrition, and appropriate physical activity. Age-appropriate Screening & Interventions recommended by USPSTF were reviewed with the patient, and Health Maintenance was updated in Epic.  BMI is within normal parameters. No other follow-up for BMI  required.    2. Controlled, continue current regimen    3. LDL is a bit above goal. Continue statin. Monitor closely.    4. Controlled, no change.    5. Controlled, no change.    6, 7. XR. PT.        Medications, including side effects, were discussed with the patient. Patient verbalized understanding.  The plan of care was discussed. All questions were answered. Patient verbalized understanding.        Return in about 6 months (around 6/28/2024) for fasting labs one week prior to.

## 2024-02-15 ENCOUNTER — TELEPHONE (OUTPATIENT)
Dept: INTERNAL MEDICINE | Facility: CLINIC | Age: 64
End: 2024-02-15
Payer: COMMERCIAL

## 2024-02-16 ENCOUNTER — PATIENT ROUNDING (BHMG ONLY) (OUTPATIENT)
Dept: URGENT CARE | Facility: CLINIC | Age: 64
End: 2024-02-16
Payer: COMMERCIAL

## 2024-04-03 DIAGNOSIS — E78.2 MIXED HYPERLIPIDEMIA: ICD-10-CM

## 2024-04-03 RX ORDER — ATORVASTATIN CALCIUM 40 MG/1
40 TABLET, FILM COATED ORAL
Qty: 90 TABLET | Refills: 3 | Status: SHIPPED | OUTPATIENT
Start: 2024-04-03

## 2024-06-05 DIAGNOSIS — E78.2 MIXED HYPERLIPIDEMIA: ICD-10-CM

## 2024-06-05 DIAGNOSIS — E11.9 TYPE 2 DIABETES MELLITUS WITHOUT COMPLICATION, WITHOUT LONG-TERM CURRENT USE OF INSULIN: ICD-10-CM

## 2024-06-25 LAB
ALBUMIN SERPL-MCNC: 4.5 G/DL (ref 3.9–4.9)
ALP SERPL-CCNC: 74 IU/L (ref 44–121)
ALT SERPL-CCNC: 9 IU/L (ref 0–32)
AST SERPL-CCNC: 17 IU/L (ref 0–40)
BILIRUB SERPL-MCNC: 0.3 MG/DL (ref 0–1.2)
BUN SERPL-MCNC: 7 MG/DL (ref 8–27)
BUN/CREAT SERPL: 9 (ref 12–28)
CALCIUM SERPL-MCNC: 9.5 MG/DL (ref 8.7–10.3)
CHLORIDE SERPL-SCNC: 103 MMOL/L (ref 96–106)
CHOLEST SERPL-MCNC: 190 MG/DL (ref 100–199)
CHOLEST/HDLC SERPL: 2.8 RATIO (ref 0–4.4)
CO2 SERPL-SCNC: 23 MMOL/L (ref 20–29)
CREAT SERPL-MCNC: 0.8 MG/DL (ref 0.57–1)
EGFRCR SERPLBLD CKD-EPI 2021: 83 ML/MIN/1.73
GLOBULIN SER CALC-MCNC: 2.2 G/DL (ref 1.5–4.5)
GLUCOSE SERPL-MCNC: 87 MG/DL (ref 70–99)
HBA1C MFR BLD: 6.1 % (ref 4.8–5.6)
HDLC SERPL-MCNC: 68 MG/DL
LDLC SERPL CALC-MCNC: 106 MG/DL (ref 0–99)
POTASSIUM SERPL-SCNC: 4.3 MMOL/L (ref 3.5–5.2)
PROT SERPL-MCNC: 6.7 G/DL (ref 6–8.5)
SODIUM SERPL-SCNC: 140 MMOL/L (ref 134–144)
TRIGL SERPL-MCNC: 90 MG/DL (ref 0–149)
VLDLC SERPL CALC-MCNC: 16 MG/DL (ref 5–40)

## 2024-06-30 DIAGNOSIS — E11.9 TYPE 2 DIABETES MELLITUS WITHOUT COMPLICATION, WITHOUT LONG-TERM CURRENT USE OF INSULIN: ICD-10-CM

## 2024-06-30 DIAGNOSIS — F32.9 REACTIVE DEPRESSION: ICD-10-CM

## 2024-07-01 ENCOUNTER — OFFICE VISIT (OUTPATIENT)
Dept: INTERNAL MEDICINE | Facility: CLINIC | Age: 64
End: 2024-07-01
Payer: COMMERCIAL

## 2024-07-01 VITALS
TEMPERATURE: 97.7 F | OXYGEN SATURATION: 99 % | BODY MASS INDEX: 22.66 KG/M2 | SYSTOLIC BLOOD PRESSURE: 116 MMHG | HEART RATE: 78 BPM | HEIGHT: 66 IN | DIASTOLIC BLOOD PRESSURE: 70 MMHG | WEIGHT: 141 LBS

## 2024-07-01 DIAGNOSIS — E11.9 ENCOUNTER FOR DIABETIC FOOT EXAM: ICD-10-CM

## 2024-07-01 DIAGNOSIS — E11.9 TYPE 2 DIABETES MELLITUS WITHOUT COMPLICATION, WITHOUT LONG-TERM CURRENT USE OF INSULIN: Primary | ICD-10-CM

## 2024-07-01 DIAGNOSIS — E78.2 MIXED HYPERLIPIDEMIA: ICD-10-CM

## 2024-07-01 DIAGNOSIS — F32.9 REACTIVE DEPRESSION: ICD-10-CM

## 2024-07-01 PROCEDURE — 99214 OFFICE O/P EST MOD 30 MIN: CPT | Performed by: NURSE PRACTITIONER

## 2024-07-01 RX ORDER — ATORVASTATIN CALCIUM 80 MG/1
80 TABLET, FILM COATED ORAL
Qty: 90 TABLET | Refills: 3 | Status: SHIPPED | OUTPATIENT
Start: 2024-07-01

## 2024-07-01 NOTE — PROGRESS NOTES
"Subjective   Dionne Bond is a 63 y.o. female presenting today for follow up of her chronic health conditions including but not limited to:  Chief Complaint   Patient presents with    Diabetes     6 mo f/u    Hyperlipidemia    Depression       She is feeling well. Moods are stable. She has a new job and struggled for a bit but this was situational and improving      Outpatient Medications Marked as Taking for the 7/1/24 encounter (Office Visit) with Linnette Izaguirre APRN   Medication Sig Dispense Refill    atorvastatin (LIPITOR) 40 MG tablet TAKE 1 TABLET BY MOUTH AT  NIGHT 90 tablet 3    hydrOXYzine (ATARAX) 25 MG tablet Take 1 tablet by mouth At Night As Needed (insomina). 90 tablet 1    metFORMIN (GLUCOPHAGE) 1000 MG tablet Take 1 tablet by mouth 2 (Two) Times a Day With Meals. 180 tablet 3    Semaglutide, 1 MG/DOSE, (Ozempic, 1 MG/DOSE,) 4 MG/3ML solution pen-injector Inject 1 mg under the skin into the appropriate area as directed 1 (One) Time Per Week. 9 mL 4    sertraline (ZOLOFT) 100 MG tablet Take 1.5 tablets by mouth Daily. 135 tablet 3    SITagliptin (Januvia) 100 MG tablet Take 1 tablet by mouth Daily. 90 tablet 3         The following portions of the patient's history were reviewed and updated as appropriate: allergies, current medications, past family history, past medical history, past social history, past surgical history and problem list.    Review of Systems   Respiratory:  Negative for shortness of breath.    Cardiovascular:  Negative for chest pain and palpitations.   Neurological:  Negative for dizziness and headache.       Objective   Vitals:    07/01/24 0753   BP: 116/70   BP Location: Left arm   Patient Position: Sitting   Cuff Size: Adult   Pulse: 78   Temp: 97.7 °F (36.5 °C)   TempSrc: Infrared   SpO2: 99%   Weight: 64 kg (141 lb)   Height: 167.6 cm (66\")       BP Readings from Last 3 Encounters:   07/01/24 116/70   02/15/24 130/81   02/12/24 116/72        Wt Readings from Last 3 " Encounters:   07/01/24 64 kg (141 lb)   02/12/24 70.3 kg (155 lb)   12/28/23 68.9 kg (151 lb 12.8 oz)        Body mass index is 22.76 kg/m².  Nursing notes and vitals reviewed.    Physical Exam  Constitutional:       General: She is not in acute distress.     Appearance: She is well-developed.   Cardiovascular:      Rate and Rhythm: Regular rhythm.      Pulses:           Dorsalis pedis pulses are 2+ on the right side and 2+ on the left side.        Posterior tibial pulses are 2+ on the right side and 2+ on the left side.      Heart sounds: S1 normal and S2 normal.   Pulmonary:      Effort: Pulmonary effort is normal.      Breath sounds: Normal breath sounds.   Musculoskeletal:      Right foot: Normal range of motion. No deformity.      Left foot: Normal range of motion. No deformity.   Feet:      Right foot:      Protective Sensation: 5 sites tested.  5 sites sensed.      Skin integrity: Skin integrity normal.      Toenail Condition: Right toenails are normal.      Left foot:      Protective Sensation: 5 sites tested.  5 sites sensed.      Skin integrity: Skin integrity normal.      Toenail Condition: Left toenails are normal.   Neurological:      Mental Status: She is alert and oriented to person, place, and time.   Psychiatric:         Attention and Perception: She is attentive.         Behavior: Behavior normal.         Thought Content: Thought content normal.         Recent Results (from the past 672 hour(s))   Lipid Panel With / Chol / HDL Ratio    Collection Time: 06/24/24  8:03 AM    Specimen: Blood   Result Value Ref Range    Total Cholesterol 190 100 - 199 mg/dL    Triglycerides 90 0 - 149 mg/dL    HDL Cholesterol 68 >39 mg/dL    VLDL Cholesterol Rashid 16 5 - 40 mg/dL    LDL Chol Calc (NIH) 106 (H) 0 - 99 mg/dL    Chol/HDL Ratio 2.8 0.0 - 4.4 ratio   Hemoglobin A1c    Collection Time: 06/24/24  8:03 AM    Specimen: Blood   Result Value Ref Range    Hemoglobin A1C 6.1 (H) 4.8 - 5.6 %   Comprehensive Metabolic  Panel    Collection Time: 06/24/24  8:03 AM    Specimen: Blood   Result Value Ref Range    Glucose 87 70 - 99 mg/dL    BUN 7 (L) 8 - 27 mg/dL    Creatinine 0.80 0.57 - 1.00 mg/dL    EGFR Result 83 >59 mL/min/1.73    BUN/Creatinine Ratio 9 (L) 12 - 28    Sodium 140 134 - 144 mmol/L    Potassium 4.3 3.5 - 5.2 mmol/L    Chloride 103 96 - 106 mmol/L    Total CO2 23 20 - 29 mmol/L    Calcium 9.5 8.7 - 10.3 mg/dL    Total Protein 6.7 6.0 - 8.5 g/dL    Albumin 4.5 3.9 - 4.9 g/dL    Globulin 2.2 1.5 - 4.5 g/dL    Total Bilirubin 0.3 0.0 - 1.2 mg/dL    Alkaline Phosphatase 74 44 - 121 IU/L    AST (SGOT) 17 0 - 40 IU/L    ALT (SGPT) 9 0 - 32 IU/L         Assessment & Plan   Diagnoses and all orders for this visit:    1. Type 2 diabetes mellitus without complication, without long-term current use of insulin (Primary)    2. Mixed hyperlipidemia  -     atorvastatin (LIPITOR) 80 MG tablet; Take 1 tablet by mouth every night at bedtime.  Dispense: 90 tablet; Refill: 3    3. Reactive depression    4. Encounter for diabetic foot exam        #1. Controlled. No change.    2. Not at goal of LDL <70.  Increase Lipitor to 80mg.    3. Controlled. No change.      Encouraged to scheduled GYN appt.  Encouraged QD calcium and Vit D supp.      Medications, including side effects, were discussed with the patient. Patient verbalized understanding.  The plan of care was discussed. All questions were answered. Patient verbalized understanding.      Return in about 6 months (around 1/1/2025) for fasting labs one week prior to, Annual physical.

## 2024-07-02 NOTE — TELEPHONE ENCOUNTER
Rx Refill Note  Requested Prescriptions     Pending Prescriptions Disp Refills    Januvia 100 MG tablet [Pharmacy Med Name: Januvia 100 MG Oral Tablet] 90 tablet 3     Sig: TAKE 1 TABLET BY MOUTH DAILY    metFORMIN (GLUCOPHAGE) 1000 MG tablet [Pharmacy Med Name: metFORMIN HCl 1000 MG Oral Tablet] 180 tablet 3     Sig: TAKE 1 TABLET BY MOUTH TWICE A  DAY WITH MEALS    sertraline (ZOLOFT) 100 MG tablet [Pharmacy Med Name: Sertraline HCl 100 MG Oral Tablet] 135 tablet 3     Sig: TAKE ONE AND ONE-HALF TABLETS BY MOUTH DAILY      Last office visit with prescribing clinician: 12/28/2023   Last telemedicine visit with prescribing clinician: Visit date not found   Next office visit with prescribing clinician: 7/1/2024                         Would you like a call back once the refill request has been completed: [] Yes [] No    If the office needs to give you a call back, can they leave a voicemail: [] Yes [] No    Yousif Restrepo  07/02/24, 17:36 EDT

## 2024-07-08 RX ORDER — SERTRALINE HYDROCHLORIDE 100 MG/1
150 TABLET, FILM COATED ORAL DAILY
Qty: 135 TABLET | Refills: 3 | Status: SHIPPED | OUTPATIENT
Start: 2024-07-08

## 2024-07-08 RX ORDER — SITAGLIPTIN 100 MG/1
100 TABLET, FILM COATED ORAL DAILY
Qty: 90 TABLET | Refills: 3 | Status: SHIPPED | OUTPATIENT
Start: 2024-07-08

## 2024-11-27 ENCOUNTER — TRANSCRIBE ORDERS (OUTPATIENT)
Dept: ADMINISTRATIVE | Facility: HOSPITAL | Age: 64
End: 2024-11-27
Payer: COMMERCIAL

## 2024-11-27 DIAGNOSIS — Z12.31 VISIT FOR SCREENING MAMMOGRAM: Primary | ICD-10-CM

## 2024-12-04 DIAGNOSIS — E78.2 MIXED HYPERLIPIDEMIA: ICD-10-CM

## 2024-12-04 DIAGNOSIS — E11.9 TYPE 2 DIABETES MELLITUS WITHOUT COMPLICATION, WITHOUT LONG-TERM CURRENT USE OF INSULIN: ICD-10-CM

## 2025-01-03 LAB
ALBUMIN SERPL-MCNC: 4.4 G/DL (ref 3.9–4.9)
ALBUMIN/CREAT UR: 9 MG/G CREAT (ref 0–29)
ALP SERPL-CCNC: 83 IU/L (ref 44–121)
ALT SERPL-CCNC: 26 IU/L (ref 0–32)
APPEARANCE UR: CLEAR
AST SERPL-CCNC: 30 IU/L (ref 0–40)
BILIRUB SERPL-MCNC: 0.3 MG/DL (ref 0–1.2)
BILIRUB UR QL STRIP: NEGATIVE
BUN SERPL-MCNC: 11 MG/DL (ref 8–27)
BUN/CREAT SERPL: 15 (ref 12–28)
CALCIUM SERPL-MCNC: 9.7 MG/DL (ref 8.7–10.3)
CHLORIDE SERPL-SCNC: 100 MMOL/L (ref 96–106)
CHOLEST SERPL-MCNC: 142 MG/DL (ref 100–199)
CHOLEST/HDLC SERPL: 2.7 RATIO (ref 0–4.4)
CO2 SERPL-SCNC: 25 MMOL/L (ref 20–29)
COLOR UR: YELLOW
CREAT SERPL-MCNC: 0.72 MG/DL (ref 0.57–1)
CREAT UR-MCNC: 157.2 MG/DL
EGFRCR SERPLBLD CKD-EPI 2021: 94 ML/MIN/1.73
ERYTHROCYTE [DISTWIDTH] IN BLOOD BY AUTOMATED COUNT: 12.9 % (ref 11.7–15.4)
GLOBULIN SER CALC-MCNC: 2.3 G/DL (ref 1.5–4.5)
GLUCOSE SERPL-MCNC: 90 MG/DL (ref 70–99)
GLUCOSE UR QL STRIP: NEGATIVE
HBA1C MFR BLD: 5.8 % (ref 4.8–5.6)
HCT VFR BLD AUTO: 41.1 % (ref 34–46.6)
HDLC SERPL-MCNC: 53 MG/DL
HGB BLD-MCNC: 13.3 G/DL (ref 11.1–15.9)
HGB UR QL STRIP: NEGATIVE
KETONES UR QL STRIP: ABNORMAL
LDLC SERPL CALC-MCNC: 71 MG/DL (ref 0–99)
LEUKOCYTE ESTERASE UR QL STRIP: NEGATIVE
MCH RBC QN AUTO: 29.2 PG (ref 26.6–33)
MCHC RBC AUTO-ENTMCNC: 32.4 G/DL (ref 31.5–35.7)
MCV RBC AUTO: 90 FL (ref 79–97)
MICROALBUMIN UR-MCNC: 13.8 UG/ML
NITRITE UR QL STRIP: NEGATIVE
PH UR STRIP: 6 [PH] (ref 5–7.5)
PLATELET # BLD AUTO: 312 X10E3/UL (ref 150–450)
POTASSIUM SERPL-SCNC: 4 MMOL/L (ref 3.5–5.2)
PROT SERPL-MCNC: 6.7 G/DL (ref 6–8.5)
PROT UR QL STRIP: NEGATIVE
RBC # BLD AUTO: 4.55 X10E6/UL (ref 3.77–5.28)
SODIUM SERPL-SCNC: 140 MMOL/L (ref 134–144)
SP GR UR STRIP: 1.02 (ref 1–1.03)
TRIGL SERPL-MCNC: 98 MG/DL (ref 0–149)
TSH SERPL DL<=0.005 MIU/L-ACNC: 0.86 UIU/ML (ref 0.45–4.5)
UROBILINOGEN UR STRIP-MCNC: 1 MG/DL (ref 0.2–1)
VLDLC SERPL CALC-MCNC: 18 MG/DL (ref 5–40)
WBC # BLD AUTO: 8.6 X10E3/UL (ref 3.4–10.8)

## 2025-01-14 ENCOUNTER — OFFICE VISIT (OUTPATIENT)
Dept: INTERNAL MEDICINE | Facility: CLINIC | Age: 65
End: 2025-01-14
Payer: COMMERCIAL

## 2025-01-14 VITALS
OXYGEN SATURATION: 98 % | WEIGHT: 132.4 LBS | SYSTOLIC BLOOD PRESSURE: 102 MMHG | BODY MASS INDEX: 21.37 KG/M2 | TEMPERATURE: 97.1 F | HEART RATE: 58 BPM | DIASTOLIC BLOOD PRESSURE: 70 MMHG

## 2025-01-14 DIAGNOSIS — E78.2 MIXED HYPERLIPIDEMIA: ICD-10-CM

## 2025-01-14 DIAGNOSIS — E11.9 TYPE 2 DIABETES MELLITUS WITHOUT COMPLICATION, WITHOUT LONG-TERM CURRENT USE OF INSULIN: ICD-10-CM

## 2025-01-14 DIAGNOSIS — F32.9 REACTIVE DEPRESSION: ICD-10-CM

## 2025-01-14 DIAGNOSIS — Z00.00 ENCOUNTER FOR WELLNESS EXAMINATION IN ADULT: Primary | ICD-10-CM

## 2025-01-14 PROCEDURE — 99396 PREV VISIT EST AGE 40-64: CPT | Performed by: NURSE PRACTITIONER

## 2025-01-14 NOTE — ASSESSMENT & PLAN NOTE
- controlled  - continue current regimen    Orders:    Comprehensive Metabolic Panel; Future    Hemoglobin A1c; Future

## 2025-01-14 NOTE — ASSESSMENT & PLAN NOTE
- controlled  - continue current regimen    Orders:    Comprehensive Metabolic Panel; Future    Lipid Panel With / Chol / HDL Ratio; Future

## 2025-01-14 NOTE — PROGRESS NOTES
SUBJECTIVE    Dionne is a 64 y.o. female presenting for Annual Exam    Her current/chronic health conditions include:  Patient Active Problem List   Diagnosis    Mixed hyperlipidemia    Type 2 diabetes mellitus without complication    Vitamin D deficiency    Depression    Personal history of colonic polyps       Outpatient Medications Marked as Taking for the 1/14/25 encounter (Office Visit) with Linnette Izaguirre APRN   Medication Sig Dispense Refill    atorvastatin (LIPITOR) 80 MG tablet Take 1 tablet by mouth every night at bedtime. 90 tablet 3    Ergocalciferol 10 MCG (400 UNIT) tablet Take 2 tablets by mouth Daily. 90 tablet 3    hydrOXYzine (ATARAX) 25 MG tablet Take 1 tablet by mouth At Night As Needed (insomina). 90 tablet 1    Januvia 100 MG tablet TAKE 1 TABLET BY MOUTH DAILY 90 tablet 3    metFORMIN (GLUCOPHAGE) 1000 MG tablet TAKE 1 TABLET BY MOUTH TWICE A  DAY WITH MEALS 180 tablet 3    Semaglutide, 1 MG/DOSE, (Ozempic, 1 MG/DOSE,) 4 MG/3ML solution pen-injector Inject 1 mg under the skin into the appropriate area as directed 1 (One) Time Per Week. 9 mL 4    sertraline (ZOLOFT) 100 MG tablet TAKE ONE AND ONE-HALF TABLETS BY MOUTH DAILY 135 tablet 3    cetirizine (zyrTEC) 10 MG tablet Take 1 tablet by mouth Daily. 30 tablet 3         Health Habits:  Nutrition: focusing on healthy nutrition  Exercise: 7 times/week - walking at least a couple of miles    Screenings:  PAP: GYN appt tomorrow  Mammogram: mammo schedule for later this week  Dexa: n/a  Colon Cancer: CLS due fall 2025  Tob use: n/a         Additional E&M Note during same encounter follows:  Patient has additional, significant, and separately identifiable condition(s)/problem(s) that require work above and beyond the Annual Wellness Visit      Dionne also presents for ongoing management of her chronic health conditions. These include DM2, HLD, and depression.    PHQ-2 Depression Screening  Little interest or pleasure in doing things? Not at  all   Feeling down, depressed, or hopeless? Not at all   PHQ-2 Total Score 0         The following portions of the patient's history were reviewed and updated as appropriate: allergies, current medications, problem list, past medical history, past family history, and past social history.    Review of Systems   Constitutional: Negative.    HENT: Negative.     Eyes: Negative.    Respiratory: Negative.     Cardiovascular: Negative.    Gastrointestinal: Negative.    Endocrine: Negative.    Genitourinary: Negative.    Musculoskeletal: Negative.    Skin: Negative.    Allergic/Immunologic: Negative.    Neurological: Negative.    Hematological: Negative.    Psychiatric/Behavioral: Negative.         OBJECTIVE    Vitals:    01/14/25 1134   BP: 102/70   BP Location: Left arm   Patient Position: Sitting   Cuff Size: Adult   Pulse: 58   Temp: 97.1 °F (36.2 °C)   TempSrc: Infrared   SpO2: 98%   Weight: 60.1 kg (132 lb 6.4 oz)       BP Readings from Last 3 Encounters:   01/14/25 102/70   01/03/25 115/77   07/01/24 116/70        Wt Readings from Last 3 Encounters:   01/14/25 60.1 kg (132 lb 6.4 oz)   01/03/25 59.4 kg (131 lb)   07/01/24 64 kg (141 lb)        Body mass index is 21.37 kg/m².  Nursing notes and vital signs reviewed.      Physical Exam  Constitutional:       General: She is not in acute distress.     Appearance: Normal appearance. She is well-developed.   HENT:      Head: Normocephalic.      Right Ear: Hearing, tympanic membrane, ear canal and external ear normal.      Left Ear: Hearing, tympanic membrane, ear canal and external ear normal.      Nose: Nose normal. No mucosal edema or rhinorrhea.      Mouth/Throat:      Mouth: Mucous membranes are moist.      Pharynx: Oropharynx is clear. Uvula midline.   Eyes:      General: Lids are normal.      Extraocular Movements: Extraocular movements intact.      Conjunctiva/sclera: Conjunctivae normal.      Pupils: Pupils are equal, round, and reactive to light.   Neck:       Thyroid: No thyroid mass or thyromegaly.   Cardiovascular:      Rate and Rhythm: Regular rhythm.      Pulses: Normal pulses.      Heart sounds: S1 normal and S2 normal. No murmur heard.     No friction rub. No gallop.   Pulmonary:      Effort: Pulmonary effort is normal.      Breath sounds: Normal breath sounds. No wheezing, rhonchi or rales.   Abdominal:      General: Bowel sounds are normal.      Palpations: Abdomen is soft.      Tenderness: There is no abdominal tenderness. There is no guarding.      Hernia: No hernia is present.   Musculoskeletal:         General: No deformity. Normal range of motion.      Cervical back: Normal range of motion and neck supple.   Lymphadenopathy:      Cervical: No cervical adenopathy.   Skin:     General: Skin is warm and dry.      Findings: No lesion or rash.   Neurological:      General: No focal deficit present.      Mental Status: She is alert and oriented to person, place, and time.      Cranial Nerves: No cranial nerve deficit.      Sensory: No sensory deficit.      Motor: Motor function is intact.      Coordination: Coordination is intact.      Gait: Gait normal.      Deep Tendon Reflexes: Reflexes are normal and symmetric.   Psychiatric:         Attention and Perception: She is attentive.         Mood and Affect: Mood and affect normal.         Speech: Speech normal.         Behavior: Behavior normal.         Thought Content: Thought content normal.           Data Reviewed:    Recent Results (from the past 4 weeks)   Microalbumin / Creatinine Urine Ratio - Urine, Clean Catch    Collection Time: 01/02/25  8:13 AM    Specimen: Urine, Clean Catch   Result Value Ref Range    Creatinine, Urine 157.2 Not Estab. mg/dL    Microalbumin, Urine 13.8 Not Estab. ug/mL    Microalbumin/Creatinine Ratio 9 0 - 29 mg/g creat   Urinalysis without microscopic (no culture) - Urine, Clean Catch    Collection Time: 01/02/25  8:13 AM    Specimen: Urine, Clean Catch   Result Value Ref Range     Specific Gravity, UA 1.021 1.005 - 1.030    pH, UA 6.0 5.0 - 7.5    Color, UA Yellow Yellow    Appearance, UA Clear Clear    Leukocytes, UA Negative Negative    Protein Negative Negative/Trace    Glucose, UA Negative Negative    Ketones Trace (A) Negative    Blood, UA Negative Negative    Bilirubin, UA Negative Negative    Urobilinogen, UA 1.0 0.2 - 1.0 mg/dL    Nitrite, UA Negative Negative   TSH    Collection Time: 01/02/25  8:13 AM    Specimen: Blood   Result Value Ref Range    TSH 0.859 0.450 - 4.500 uIU/mL   Lipid Panel With / Chol / HDL Ratio    Collection Time: 01/02/25  8:13 AM    Specimen: Blood   Result Value Ref Range    Total Cholesterol 142 100 - 199 mg/dL    Triglycerides 98 0 - 149 mg/dL    HDL Cholesterol 53 >39 mg/dL    VLDL Cholesterol Rashid 18 5 - 40 mg/dL    LDL Chol Calc (NIH) 71 0 - 99 mg/dL    Chol/HDL Ratio 2.7 0.0 - 4.4 ratio   Hemoglobin A1c    Collection Time: 01/02/25  8:13 AM    Specimen: Blood   Result Value Ref Range    Hemoglobin A1C 5.8 (H) 4.8 - 5.6 %   Comprehensive Metabolic Panel    Collection Time: 01/02/25  8:13 AM    Specimen: Blood   Result Value Ref Range    Glucose 90 70 - 99 mg/dL    BUN 11 8 - 27 mg/dL    Creatinine 0.72 0.57 - 1.00 mg/dL    EGFR Result 94 >59 mL/min/1.73    BUN/Creatinine Ratio 15 12 - 28    Sodium 140 134 - 144 mmol/L    Potassium 4.0 3.5 - 5.2 mmol/L    Chloride 100 96 - 106 mmol/L    Total CO2 25 20 - 29 mmol/L    Calcium 9.7 8.7 - 10.3 mg/dL    Total Protein 6.7 6.0 - 8.5 g/dL    Albumin 4.4 3.9 - 4.9 g/dL    Globulin 2.3 1.5 - 4.5 g/dL    Total Bilirubin 0.3 0.0 - 1.2 mg/dL    Alkaline Phosphatase 83 44 - 121 IU/L    AST (SGOT) 30 0 - 40 IU/L    ALT (SGPT) 26 0 - 32 IU/L   CBC (No Diff)    Collection Time: 01/02/25  8:13 AM    Specimen: Blood   Result Value Ref Range    WBC 8.6 3.4 - 10.8 x10E3/uL    RBC 4.55 3.77 - 5.28 x10E6/uL    Hemoglobin 13.3 11.1 - 15.9 g/dL    Hematocrit 41.1 34.0 - 46.6 %    MCV 90 79 - 97 fL    MCH 29.2 26.6 - 33.0 pg     MCHC 32.4 31.5 - 35.7 g/dL    RDW 12.9 11.7 - 15.4 %    Platelets 312 150 - 450 x10E3/uL         ASSESSMENT AND PLAN    Assessment & Plan  Encounter for wellness examination in adult         Mixed hyperlipidemia  - controlled  - continue current regimen    Orders:    Comprehensive Metabolic Panel; Future    Lipid Panel With / Chol / HDL Ratio; Future    Type 2 diabetes mellitus without complication, without long-term current use of insulin  - controlled  - continue current regimen    Orders:    Comprehensive Metabolic Panel; Future    Hemoglobin A1c; Future    Reactive depression  - controlled  - continue current regimen             Preventative counseling completed including relevant screenings, appropriate vaccinations, healthy nutrition, and appropriate physical activity. Age-appropriate Screening & Interventions recommended by USPSTF were reviewed with the patient, and Health Maintenance was updated in Epic.  BMI is within normal parameters. No other follow-up for BMI required.            Medications, including side effects, were discussed with the patient. Patient verbalized understanding.  The plan of care was discussed. All questions were answered. Patient verbalized understanding.        Return in about 6 months (around 7/14/2025) for fasting labs one week prior to.

## 2025-01-15 ENCOUNTER — OFFICE VISIT (OUTPATIENT)
Dept: OBSTETRICS AND GYNECOLOGY | Facility: CLINIC | Age: 65
End: 2025-01-15
Payer: COMMERCIAL

## 2025-01-15 VITALS
WEIGHT: 133 LBS | SYSTOLIC BLOOD PRESSURE: 118 MMHG | BODY MASS INDEX: 21.38 KG/M2 | DIASTOLIC BLOOD PRESSURE: 74 MMHG | HEIGHT: 66 IN

## 2025-01-15 DIAGNOSIS — Z01.419 PAP SMEAR, AS PART OF ROUTINE GYNECOLOGICAL EXAMINATION: ICD-10-CM

## 2025-01-15 DIAGNOSIS — Z01.419 ROUTINE GYNECOLOGICAL EXAMINATION: Primary | ICD-10-CM

## 2025-01-15 DIAGNOSIS — N81.9 FEMALE GENITAL PROLAPSE, UNSPECIFIED TYPE: ICD-10-CM

## 2025-01-15 DIAGNOSIS — Z11.51 SPECIAL SCREENING EXAMINATION FOR HUMAN PAPILLOMAVIRUS (HPV): ICD-10-CM

## 2025-01-15 NOTE — PROGRESS NOTES
GYN Annual Exam     CC- Here for annual exam.     Dionne Bond is a 64 y.o. female who presents for annual well woman exam. Periods are absent.  Has not been seen in the last 3 years.  Pap smear 3-1/2 years ago was negative with negative HPV.  Mammogram has been ordered and scheduled for this Friday.  Complains of pelvic fullness likely due to prolapse.  Mainly feels it when she walks her dog twice daily.  Feels like fullness in the vagina.    OB History          2    Para   2    Term   2            AB        Living             SAB        IAB        Ectopic        Molar        Multiple        Live Births                    Current contraception: post menopausal status  History of abnormal Pap smear: no  Family history of uterine, colon or ovarian cancer: no  History of abnormal mammogram: no  Family history of breast cancer: no  Last Pap :  N/N    Past Medical History:   Diagnosis Date    Abscess of vulva 2018    Depression     Diastasis recti     Hyperlipidemia     Obesity     Osteoarthritis     Type 2 diabetes mellitus without complication 2016    Vitamin D deficiency 2017       Past Surgical History:   Procedure Laterality Date    COLONOSCOPY N/A 7/3/2018    Procedure: COLONOSCOPY with polypectomy;  Surgeon: Annette Trinidad MD;  Location: [facility];  Service: Gastroenterology    COLONOSCOPY W/ POLYPECTOMY N/A 10/15/2022    Procedure: COLONOSCOPY WITH POLYPECTOMY;  Surgeon: Harley Gregory MD;  Location: [facility] OR;  Service: Gastroenterology;  Laterality: N/A;  polyp  poor prep    DILATATION AND CURETTAGE      FOOT SURGERY Left     Plantar filuroma    KNEE ARTHROSCOPY Right 2010         Current Outpatient Medications:     atorvastatin (LIPITOR) 80 MG tablet, Take 1 tablet by mouth every night at bedtime., Disp: 90 tablet, Rfl: 3    cetirizine (zyrTEC) 10 MG tablet, Take 1 tablet by mouth Daily., Disp: 30 tablet, Rfl: 3    Ergocalciferol 10 MCG (400 UNIT)  tablet, Take 2 tablets by mouth Daily., Disp: 90 tablet, Rfl: 3    glucose blood test strip, 1 each by Other route Daily. Please dispense the test strips required w/ the patient's glucometer. (Patient not taking: Reported on 1/14/2025), Disp: 100 each, Rfl: 3    glucose monitor monitoring kit, 1 each Daily. Please dispense the glucometer preferred by the patient's insurance formulary. (Patient not taking: Reported on 1/14/2025), Disp: 1 each, Rfl: 0    hydrOXYzine (ATARAX) 25 MG tablet, Take 1 tablet by mouth At Night As Needed (insomina)., Disp: 90 tablet, Rfl: 1    Januvia 100 MG tablet, TAKE 1 TABLET BY MOUTH DAILY, Disp: 90 tablet, Rfl: 3    metFORMIN (GLUCOPHAGE) 1000 MG tablet, TAKE 1 TABLET BY MOUTH TWICE A  DAY WITH MEALS, Disp: 180 tablet, Rfl: 3    ONE TOUCH LANCETS misc, Use to check blood sugar 3 times per day for 3-4 days and then once per day (Patient not taking: Reported on 1/14/2025), Disp: 200 each, Rfl: 11    Semaglutide, 1 MG/DOSE, (Ozempic, 1 MG/DOSE,) 4 MG/3ML solution pen-injector, Inject 1 mg under the skin into the appropriate area as directed 1 (One) Time Per Week., Disp: 9 mL, Rfl: 4    sertraline (ZOLOFT) 100 MG tablet, TAKE ONE AND ONE-HALF TABLETS BY MOUTH DAILY, Disp: 135 tablet, Rfl: 3    No Known Allergies    Social History     Tobacco Use    Smoking status: Never     Passive exposure: Never    Smokeless tobacco: Never   Vaping Use    Vaping status: Never Used   Substance Use Topics    Alcohol use: Yes     Comment: occasionally    Drug use: No         Family History   Problem Relation Age of Onset    Thyroid disease Mother     Alzheimer's disease Mother     Heart failure Father     Thyroid disease Sister     Heart attack Maternal Grandmother     Diabetes type II Maternal Grandfather     Stroke Maternal Grandfather     Heart attack Paternal Grandfather     Thyroid disease Niece     Colon cancer Neg Hx     Colon polyps Neg Hx     Breast cancer Neg Hx        Review of Systems  "  Constitutional:  Negative for appetite change, fever and unexpected weight change.   HENT:  Negative for congestion and sore throat.    Respiratory:  Negative for cough and shortness of breath.    Cardiovascular:  Negative for chest pain and palpitations.   Gastrointestinal:  Negative for abdominal distention, abdominal pain, constipation, diarrhea, nausea and vomiting.   Endocrine: Negative.    Genitourinary:  Positive for pelvic pain (fullness). Negative for dyspareunia, menstrual problem and vaginal discharge.   Skin: Negative.    Neurological:  Negative for dizziness and syncope.   Hematological: Negative.    Psychiatric/Behavioral:  Negative for dysphoric mood and sleep disturbance. The patient is not nervous/anxious.        /74   Ht 167.6 cm (66\")   Wt 60.3 kg (133 lb)   LMP  (LMP Unknown)   BMI 21.47 kg/m²     Physical Exam  Vitals and nursing note reviewed. Exam conducted with a chaperone present.   Constitutional:       Appearance: She is well-developed.   HENT:      Head: Normocephalic and atraumatic.   Neck:      Thyroid: No thyromegaly.   Cardiovascular:      Rate and Rhythm: Normal rate and regular rhythm.   Pulmonary:      Effort: Pulmonary effort is normal.      Breath sounds: Normal breath sounds.   Chest:   Breasts:     Breasts are symmetrical.      Right: No mass or nipple discharge.      Left: No mass or nipple discharge.   Abdominal:      General: Bowel sounds are normal. There is no distension.      Palpations: Abdomen is soft. There is no mass.      Tenderness: There is no abdominal tenderness. There is no guarding or rebound.   Genitourinary:     General: Normal vulva.      Exam position: Supine.      Labia:         Right: No lesion.         Left: No lesion.       Vagina: Normal.      Cervix: No cervical motion tenderness or discharge.      Uterus: Normal.       Adnexa:         Right: No mass.          Left: No mass.     Musculoskeletal:         General: Normal range of motion.     "  Cervical back: Normal range of motion and neck supple.   Skin:     General: Skin is warm and dry.   Neurological:      Mental Status: She is alert and oriented to person, place, and time.   Psychiatric:         Behavior: Behavior normal.         Thought Content: Thought content normal.         Judgment: Judgment normal.       Diagnoses and all orders for this visit:    Routine gynecological examination  -     Cancel: POC Urinalysis Dipstick  -     IGP, Apt HPV,rfx 16 / 18,45    Special screening examination for human papillomavirus (HPV)  -     IGP, Apt HPV,rfx 16 / 18,45    Pap smear, as part of routine gynecological examination  -     IGP, Apt HPV,rfx 16 / 18,45    Female genital prolapse, unspecified type  -     Ambulatory Referral to Gynecologic Urology        Assessment     1) GYN annual well woman exam.   2) pelvic fullness-urogynecology consult placed.     Plan     1) Breast Health - Clinical breast exam & mammogram yearly, Self breast awareness monthly  2) Pap - done today  3) Smoking status - Dionne Bond  reports that she has never smoked. She has never been exposed to tobacco smoke. She has never used smokeless tobacco. I have educated her on the risk of diseases from using tobacco products such as cancer, COPD, and heart disease.   4) Follow up prn and one year    Encounter Diagnoses   Name Primary?    Routine gynecological examination Yes    Special screening examination for human papillomavirus (HPV)     Pap smear, as part of routine gynecological examination     Female genital prolapse, unspecified type          Malik Luciano MD  1/15/2025  14:47 EST

## 2025-01-17 ENCOUNTER — HOSPITAL ENCOUNTER (OUTPATIENT)
Dept: MAMMOGRAPHY | Facility: HOSPITAL | Age: 65
Discharge: HOME | End: 2025-01-17
Admitting: NURSE PRACTITIONER
Payer: COMMERCIAL

## 2025-01-17 DIAGNOSIS — Z12.31 VISIT FOR SCREENING MAMMOGRAM: ICD-10-CM

## 2025-01-17 LAB
CYTOLOGIST CVX/VAG CYTO: NORMAL
CYTOLOGY CVX/VAG DOC CYTO: NORMAL
CYTOLOGY CVX/VAG DOC THIN PREP: NORMAL
DX ICD CODE: NORMAL
HPV I/H RISK 4 DNA CVX QL PROBE+SIG AMP: NEGATIVE
Lab: NORMAL
OTHER STN SPEC: NORMAL
STAT OF ADQ CVX/VAG CYTO-IMP: NORMAL

## 2025-01-17 PROCEDURE — 77067 SCR MAMMO BI INCL CAD: CPT

## 2025-01-17 PROCEDURE — 77063 BREAST TOMOSYNTHESIS BI: CPT

## 2025-01-24 DIAGNOSIS — E11.9 TYPE 2 DIABETES MELLITUS WITHOUT COMPLICATION, WITHOUT LONG-TERM CURRENT USE OF INSULIN: ICD-10-CM

## 2025-01-28 RX ORDER — SEMAGLUTIDE 1.34 MG/ML
INJECTION, SOLUTION SUBCUTANEOUS
Qty: 9 ML | Refills: 3 | Status: SHIPPED | OUTPATIENT
Start: 2025-01-28

## 2025-05-07 DIAGNOSIS — E78.2 MIXED HYPERLIPIDEMIA: ICD-10-CM

## 2025-05-08 RX ORDER — ATORVASTATIN CALCIUM 80 MG/1
80 TABLET, FILM COATED ORAL
Qty: 90 TABLET | Refills: 3 | Status: SHIPPED | OUTPATIENT
Start: 2025-05-08

## 2025-05-31 DIAGNOSIS — F32.9 REACTIVE DEPRESSION: ICD-10-CM

## 2025-05-31 DIAGNOSIS — E11.9 TYPE 2 DIABETES MELLITUS WITHOUT COMPLICATION, WITHOUT LONG-TERM CURRENT USE OF INSULIN: ICD-10-CM

## 2025-06-02 RX ORDER — SITAGLIPTIN 100 MG/1
100 TABLET, FILM COATED ORAL DAILY
Qty: 90 TABLET | Refills: 3 | Status: SHIPPED | OUTPATIENT
Start: 2025-06-02

## 2025-06-02 RX ORDER — SERTRALINE HYDROCHLORIDE 100 MG/1
150 TABLET, FILM COATED ORAL DAILY
Qty: 135 TABLET | Refills: 3 | Status: SHIPPED | OUTPATIENT
Start: 2025-06-02

## 2025-07-09 DIAGNOSIS — E11.9 TYPE 2 DIABETES MELLITUS WITHOUT COMPLICATION, WITHOUT LONG-TERM CURRENT USE OF INSULIN: ICD-10-CM

## 2025-07-09 DIAGNOSIS — E78.2 MIXED HYPERLIPIDEMIA: ICD-10-CM

## 2025-07-14 LAB
ALBUMIN SERPL-MCNC: 4.5 G/DL (ref 3.5–5.2)
ALBUMIN/GLOB SERPL: 2 G/DL
ALP SERPL-CCNC: 71 U/L (ref 39–117)
ALT SERPL-CCNC: 15 U/L (ref 1–33)
AST SERPL-CCNC: 28 U/L (ref 1–32)
BILIRUB SERPL-MCNC: 0.3 MG/DL (ref 0–1.2)
BUN SERPL-MCNC: 9 MG/DL (ref 8–23)
BUN/CREAT SERPL: 12.5 (ref 7–25)
CALCIUM SERPL-MCNC: 9.6 MG/DL (ref 8.6–10.5)
CHLORIDE SERPL-SCNC: 103 MMOL/L (ref 98–107)
CHOLEST SERPL-MCNC: 159 MG/DL (ref 0–200)
CHOLEST/HDLC SERPL: 2.12 {RATIO}
CO2 SERPL-SCNC: 27.3 MMOL/L (ref 22–29)
CREAT SERPL-MCNC: 0.72 MG/DL (ref 0.57–1)
EGFRCR SERPLBLD CKD-EPI 2021: 94.1 ML/MIN/1.73
GLOBULIN SER CALC-MCNC: 2.3 GM/DL
GLUCOSE SERPL-MCNC: 94 MG/DL (ref 65–99)
HBA1C MFR BLD: 5.6 % (ref 4.8–5.6)
HDLC SERPL-MCNC: 75 MG/DL (ref 40–60)
LDLC SERPL CALC-MCNC: 73 MG/DL (ref 0–100)
POTASSIUM SERPL-SCNC: 4.1 MMOL/L (ref 3.5–5.2)
PROT SERPL-MCNC: 6.8 G/DL (ref 6–8.5)
SODIUM SERPL-SCNC: 140 MMOL/L (ref 136–145)
TRIGL SERPL-MCNC: 55 MG/DL (ref 0–150)
VLDLC SERPL CALC-MCNC: 11 MG/DL (ref 5–40)

## 2025-07-22 ENCOUNTER — OFFICE VISIT (OUTPATIENT)
Dept: INTERNAL MEDICINE | Facility: CLINIC | Age: 65
End: 2025-07-22
Payer: COMMERCIAL

## 2025-07-22 VITALS
HEART RATE: 72 BPM | OXYGEN SATURATION: 98 % | TEMPERATURE: 97.7 F | DIASTOLIC BLOOD PRESSURE: 64 MMHG | HEIGHT: 66 IN | SYSTOLIC BLOOD PRESSURE: 92 MMHG | WEIGHT: 125.8 LBS | BODY MASS INDEX: 20.22 KG/M2

## 2025-07-22 DIAGNOSIS — F41.9 ANXIETY: ICD-10-CM

## 2025-07-22 DIAGNOSIS — E11.9 ENCOUNTER FOR DIABETIC FOOT EXAM: ICD-10-CM

## 2025-07-22 DIAGNOSIS — E78.2 MIXED HYPERLIPIDEMIA: ICD-10-CM

## 2025-07-22 DIAGNOSIS — Z86.0100 HISTORY OF COLONIC POLYPS: ICD-10-CM

## 2025-07-22 DIAGNOSIS — Z12.11 SCREENING FOR MALIGNANT NEOPLASM OF COLON: ICD-10-CM

## 2025-07-22 DIAGNOSIS — E55.9 VITAMIN D DEFICIENCY: ICD-10-CM

## 2025-07-22 DIAGNOSIS — F32.9 REACTIVE DEPRESSION: ICD-10-CM

## 2025-07-22 DIAGNOSIS — E11.9 TYPE 2 DIABETES MELLITUS WITHOUT COMPLICATION, WITHOUT LONG-TERM CURRENT USE OF INSULIN: Primary | ICD-10-CM

## 2025-07-22 PROCEDURE — 99214 OFFICE O/P EST MOD 30 MIN: CPT | Performed by: NURSE PRACTITIONER

## 2025-07-22 RX ORDER — BUPROPION HYDROCHLORIDE 150 MG/1
150 TABLET ORAL DAILY
Qty: 90 TABLET | Refills: 0 | Status: SHIPPED | OUTPATIENT
Start: 2025-07-22

## 2025-07-22 NOTE — ASSESSMENT & PLAN NOTE
- continue sertraline  - add Wellbutriin    Orders:    buPROPion XL (Wellbutrin XL) 150 MG 24 hr tablet; Take 1 tablet by mouth Daily.

## 2025-07-22 NOTE — ASSESSMENT & PLAN NOTE
- continue sertraline  - add Wellbutrin    Orders:    buPROPion XL (Wellbutrin XL) 150 MG 24 hr tablet; Take 1 tablet by mouth Daily.

## 2025-08-22 ENCOUNTER — TELEPHONE (OUTPATIENT)
Dept: GASTROENTEROLOGY | Facility: CLINIC | Age: 65
End: 2025-08-22
Payer: COMMERCIAL

## 2025-08-29 DIAGNOSIS — Z86.0101 PERSONAL HISTORY OF ADENOMATOUS AND SERRATED COLON POLYPS: Primary | ICD-10-CM

## (undated) DEVICE — BW-412T DISP COMBO CLEANING BRUSH: Brand: SINGLE USE COMBINATION CLEANING BRUSH

## (undated) DEVICE — Device: Brand: DEFENDO AIR/WATER/SUCTION AND BIOPSY VALVE

## (undated) DEVICE — GLV SURG SENSICARE PI MIC PF SZ7.5 LF STRL

## (undated) DEVICE — SYR LL 3CC

## (undated) DEVICE — KT ORCA ORCAPOD DISP STRL

## (undated) DEVICE — ENDO. PORT CONNECTOR W/VALVE FOR OLYMPUS® SCOPES: Brand: ERBE

## (undated) DEVICE — SPNG GZ WOVN 4X4IN 12PLY 10/BX STRL

## (undated) DEVICE — Device

## (undated) DEVICE — VIAL FORMALIN CAP 10P 40ML

## (undated) DEVICE — ADAPT CLN BIOGUARD AIR/H2O DISP

## (undated) DEVICE — MASK,FACE,FLUID RESIST,SHLD,EARLOOP: Brand: MEDLINE

## (undated) DEVICE — SAFELINER SUCTION CANISTER 1000CC: Brand: DEROYAL

## (undated) DEVICE — SUCTION CANISTER, 3000CC,SAFELINER: Brand: DEROYAL

## (undated) DEVICE — FRCP BX RADJAW4 NDL 2.8 240CM LG OG BX40

## (undated) DEVICE — JACKT LAB F/R KNIT CUFF/COLR XLG BLU

## (undated) DEVICE — GLV SURG SENSICARE W/ALOE PF LF 6.5 STRL

## (undated) DEVICE — GOWN ISOL W/THUMB UNIV BLU BX/15